# Patient Record
Sex: FEMALE | Race: WHITE | Employment: UNEMPLOYED | URBAN - METROPOLITAN AREA
[De-identification: names, ages, dates, MRNs, and addresses within clinical notes are randomized per-mention and may not be internally consistent; named-entity substitution may affect disease eponyms.]

---

## 2017-08-29 PROBLEM — Z34.00 SUPERVISION OF LOW-RISK FIRST PREGNANCY: Status: ACTIVE | Noted: 2017-08-29

## 2017-10-08 ENCOUNTER — HOSPITAL ENCOUNTER (EMERGENCY)
Age: 32
Discharge: HOME | End: 2017-10-08
Attending: OBSTETRICS & GYNECOLOGY | Admitting: OBSTETRICS & GYNECOLOGY
Payer: MEDICAID

## 2017-10-08 VITALS
HEIGHT: 64 IN | HEART RATE: 95 BPM | WEIGHT: 179 LBS | SYSTOLIC BLOOD PRESSURE: 127 MMHG | DIASTOLIC BLOOD PRESSURE: 80 MMHG | RESPIRATION RATE: 18 BRPM | TEMPERATURE: 98.9 F | BODY MASS INDEX: 30.56 KG/M2

## 2017-10-08 PROBLEM — O42.90 AMNIOTIC FLUID LEAKING: Status: ACTIVE | Noted: 2017-10-08

## 2017-10-08 LAB
A1 MICROGLOB PLACENTAL VAG QL: NEGATIVE
CONTROL LINE PRESENT?: YES
EXPIRATION DATE: NORMAL
INTERNAL NEGATIVE CONTROL: NEGATIVE
KIT LOT NO.: NORMAL

## 2017-10-08 PROCEDURE — 84112 EVAL AMNIOTIC FLUID PROTEIN: CPT | Performed by: OBSTETRICS & GYNECOLOGY

## 2017-10-08 PROCEDURE — 99283 EMERGENCY DEPT VISIT LOW MDM: CPT

## 2017-10-08 PROCEDURE — 59025 FETAL NON-STRESS TEST: CPT

## 2017-10-08 NOTE — IP AVS SNAPSHOT
Summary of Care Report The Summary of Care report has been created to help improve care coordination. Users with access to [employer] or [address] (Web-based application) may access additional patient information including the Discharge Summary. If you are not currently a [address] user and need more information, please call the number listed below in the [place] [address] section and ask to be connected with Medical Records. Facility Information Name Address Phone [id number] [place] [address] [address] [place] [address] [phone] Patient Information Patient Name Sex  April Jamil ([id number]) Female 1985 Discharge Information Admitting Provider Service Area Unit Teena Parra MD / [address] [facility] 4 Antepartum / [phone] Discharge Provider Discharge Date/Time Discharge Disposition Destination (none) 10/8/2017 22:05 (Pending) AHR (none) Patient Language Language ENGLISH [13] Hospital Problems as of 10/8/2017  Reviewed: 10/4/2017  4:17 PM by Pa Tadeo MD  
  
  
  
 Class Noted - Resolved Last Modified POA Active Problems Amniotic fluid leaking  10/8/2017 - Present 10/8/2017 by Teena Parra MD Unknown Entered by Teena Parra MD  
  
Non-Hospital Problems as of 10/8/2017  Reviewed: 10/4/2017  4:17 PM by Pa Tadeo MD  
  
  
  
 Class Noted - Resolved Last Modified Active Problems Supervision of low-risk first pregnancy  2017 - Present 2017 by Pa Tadeo MD  
  Entered by Pa Tadeo MD  
  Overview Addendum 2017  4:38 PM by Pa Tadeo MD  
   LATE TX at 33wks from [place] PN labs pending; glucola wnl 1st [place] screen wnl, NT 1.1 mm Records reviewed:  LMP 10/1/17; [facility] 10/17/17 Placenta post/high; growth at [facility] there wnl Bps wnl in [facility] although several higher normotensive (133/81, 131/82, 127/86) Pap---no performed in [facility] (will get Pp) ; Cxs __ Get growth US NV ____ Repeat US here NV___ You are allergic to the following No active allergies Current Discharge Medication List  
  
ASK your doctor about these medications Dose & Instructions Dispensing Information Comments CALCIUM 600 + D 600-125 mg-unit Tab Generic drug:  calcium-cholecalciferol (d3) Take  by mouth. Refills:  0 Iron 160 mg (50 mg iron) Tber tablet Generic drug:  ferrous sulfate ER Dose:  1 Tab Take 1 Tab by mouth daily. Refills:  0 PNV#16-Iron Fum & PS-FA-OM-3 35-1-200 mg Cap Take  by mouth. Refills:  0 Current Immunizations Name Date Influenza Vaccine (Quad) Mdck Pf 9/21/2017 Follow-up Information Follow up With Details Comments Contact Info None   None (395) Patient stated that they have no PCP Discharge Instructions PLEASE ATTEND SCHEDULED OB PRENATAL APPOINTMENT. RETURN TO OB TRIAGE IF YOU THINK YOU ARE IN LABOR, OR IF YOUR WATER BREAKS, OR IF VAGINAL BLEEDING. OR ANY OTHER CONCERNS. Chart Review Routing History No Routing History on File

## 2017-10-08 NOTE — IP AVS SNAPSHOT
Sergio Echols 
 
 
 [address] [place] [address] 
[phone] Patient: Lucia Aguirre MRN: [id number] FJI:[birth year] Current Discharge Medication List  
  
ASK your doctor about these medications Dose & Instructions Dispensing Information Comments Morning Noon Evening Bedtime CALCIUM 600 + D 600-125 mg-unit Tab Generic drug:  calcium-cholecalciferol (d3) Your last dose was: Your next dose is: Take  by mouth. Refills:  0 Iron 160 mg (50 mg iron) Tber tablet Generic drug:  ferrous sulfate ER Your last dose was: Your next dose is:    
   
   
 Dose:  1 Tab Take 1 Tab by mouth daily. Refills:  0 PNV#16-Iron Fum & PS-FA-OM-3 35-1-200 mg Cap Your last dose was: Your next dose is: Take  by mouth. Refills:  0

## 2017-10-08 NOTE — IP AVS SNAPSHOT
[facility] [address] 
[phone] Patient: Rodolfo Goldberg MRN: [id number] RSS:7/4/4321 You are allergic to the following No active allergies Recent Documentation Height Weight BMI OB Status Smoking Status 1.626 m 81.2 kg 30.73 kg/m2 Pregnant Former Smoker Emergency Contacts Name Discharge Info Relation Home Work Mobile Dawood Wynn  Mother [14] [phone] About your hospitalization You were admitted on:  N/A You last received care in the:  [facility] 4 ANTEPARTUM You were discharged on:  October 8, 2017 Unit phone number:  [phone] Why you were hospitalized Your primary diagnosis was:  Not on File Your diagnoses also included:  Amniotic Fluid Leaking Providers Seen During Your Hospitalizations Provider Role Specialty Primary office phone Conrado Mueller MD Attending Provider Obstetrics & Gynecology [phone] Your Primary Care Physician (PCP) Primary Care Physician Office Phone Office Fax NONE ** None ** ** None ** Follow-up Information Follow up With Details Comments Contact Info None   None (395) Patient stated that they have no PCP Your Appointments Thursday October 12, 2017  1:30 PM EDT Ultrasound plus physician visit with [facility] ([facility]) [address] [address] [phone] Thursday October 12, 2017  2:15 PM EDT Ultrasound plus physician visit with Narciso Ortega MD  
[facility] ([facility]) [address] [address] [phone] Current Discharge Medication List  
  
ASK your doctor about these medications Dose & Instructions Dispensing Information Comments Morning Noon Evening Bedtime CALCIUM 600 + D 600-125 mg-unit Tab Generic drug:  calcium-cholecalciferol (d3) Your last dose was: Your next dose is: Take  by mouth. Refills:  0 Iron 160 mg (50 mg iron) Tber tablet Generic drug:  ferrous sulfate ER Your last dose was: Your next dose is:    
   
   
 Dose:  1 Tab Take 1 Tab by mouth daily. Refills:  0 PNV#16-Iron Fum & PS-FA-OM-3 35-1-200 mg Cap Your last dose was: Your next dose is: Take  by mouth. Refills:  0 Discharge Instructions PLEASE ATTEND SCHEDULED OB PRENATAL APPOINTMENT. RETURN TO OB TRIAGE IF YOU THINK YOU ARE IN LABOR, OR IF YOUR WATER BREAKS, OR IF VAGINAL BLEEDING. OR ANY OTHER CONCERNS. Discharge Instructions Attachments/References PREGNANCY: WEEK 38 (ENGLISH) PREGNANCY: FARHEEN COOK CONTRACTIONS (ENGLISH) Discharge Orders None Introducing [facility] SERVICES! [employer] introduces [employer] patient portal. Now you can access parts of your medical record, email your doctor's office, and request medication refills online. 1. In your internet browser, go to [email or web address] 2. Click on the First Time User? Click Here link in the Sign In box. You will see the New Member Sign Up page. 3. Enter your [employer] Access Code exactly as it appears below. You will not need to use this code after youve completed the sign-up process. If you do not sign up before the expiration date, you must request a new code. · [employer] Access Code: [email or web address] Expires: 11/26/2017 10:15 AM 
 
4. Enter the last four digits of your Social Security Number (xxxx) and Date of Birth (mm/dd/yyyy) as indicated and click Submit. You will be taken to the next sign-up page. 5. Create a [employer]er ID. This will be your [employer]er login ID and cannot be changed, so think of one that is secure and easy to remember. 6. Create a [facility] password. You can change your password at any time. 7. Enter your Password Reset Question and Answer. This can be used at a later time if you forget your password. 8. Enter your e-mail address. You will receive e-mail notification when new information is available in [address] 9. Click Sign Up. You can now view and download portions of your medical record. 10. Click the Download Summary menu link to download a portable copy of your medical information. If you have questions, please visit the Frequently Asked Questions section of the [employer] website. Remember, [facility] is NOT to be used for urgent needs. For medical emergencies, dial 911. Now available from your iPhone and Android! General Information Please provide this summary of care documentation to your next provider. Patient Signature:  ____________________________________________________________ Date:  ____________________________________________________________  
  
Lloyd Lambert Provider Signature:  ____________________________________________________________ Date:  ____________________________________________________________ More Information Week 38 of Your Pregnancy: Care Instructions Your Care Instructions Believe it or not, your baby is almost here. You may have ideas about your baby's personality because of how much he or she moves. Or you may have noticed how he or she responds to sounds, warmth, cold, and light. You may even know what kind of music your baby likes. By now, you have a better idea of what to expect during delivery. You may have talked about your birth preferences with your doctor. But even if you want a vaginal birth, it is a good idea to learn about  births.  birth means that your baby is born through a cut (incision) in your lower belly. It is sometimes the best choice for the health of the baby and the mother. This care sheet can help you understand  births. It also gives you information about what to expect after your baby is born. And it helps you understand more about postpartum depression. Follow-up care is a key part of your treatment and safety. Be sure to make and go to all appointments, and call your doctor if you are having problems. It's also a good idea to know your test results and keep a list of the medicines you take. How can you care for yourself at home? Learn about  birth · Most C-sections are unplanned. They are done because of problems that occur during labor. These problems might include: 
¨ Labor that slows or stops. ¨ High blood pressure or other problems for the mother. ¨ Signs of distress in the baby. These signs may include a very fast or slow heart rate. · Although most mothers and babies do well after , it is major surgery. It has more risks than a vaginal delivery. · In some cases, a planned  may be safer than a vaginal delivery. This may be the case if: ¨ The mother has a health problem, such as a heart condition. ¨ The baby isn't in a head-down position for delivery. This is called a breech position. ¨ The uterus has scars from past surgeries. This could increase the chance of a tear in the uterus. ¨ There is a problem with the placenta. ¨ The mother has an infection, such as genital herpes, that could be spread to the baby. ¨ The mother is having twins or more. ¨ The baby weighs 9 to 10 pounds or more. · Because of the risks of , planned C-sections generally should be done only for medical reasons. And a planned  should be done at 39 weeks or later unless there is a medical reason to do it sooner. Know what to expect after delivery, and plan for the first few weeks at home · You, your baby, and your partner or  will get identification bands. Only people with matching bands can  the baby from the nursery. · You will learn how to feed, diaper, and bathe your baby. And you will learn how to care for the umbilical cord stump. If your baby will be circumcised, you will also learn how to care for that. · Ask people to wait to visit you until you are at home. And ask them to wash their hands before they touch your baby. · Make sure you have another adult in your home for at least 2 or 3 days after the birth. · During the first 2 weeks, limit when friends and family can visit. · Do not allow visitors who have colds or infections. Make sure all visitors are up to date with their vaccinations. Never let anyone smoke around your baby. · Try to nap when the baby naps. Be aware of postpartum depression · \"Baby blues\" are common for the first 1 to 2 weeks after birth. You may cry or feel sad or irritable for no reason. · For some women, these feelings last longer and are more intense. This is called postpartum depression. · If your symptoms last for more than a few weeks or you feel very depressed, ask your doctor for help. · Postpartum depression can be treated. Support groups and counseling can help. Sometimes medicine can also help. Where can you learn more? Go to http://archie-kyle.info/. Enter B044 in the search box to learn more about \"Week 38 of Your Pregnancy: Care Instructions. \" Current as of: March 16, 2017 Content Version: 11.3 © 8420-2602 [employer]. Care instructions adapted under license by [employer] (which disclaims liability or warranty for this information). If you have questions about a medical condition or this instruction, always ask your healthcare professional. [place] [address] any warranty or liability for your use of this information. [facility] Contractions: Care Instructions Your Care Instructions [place] contractions prepare your uterus for labor.  Think of them as a \"warm-up\" exercise that your body does. You may begin to feel them between the 28th and 30th weeks of your pregnancy. But they start as early as the 20th week. [place] Thapa contractions usually occur more often during the ninth month. They may go away when you are active and return when you rest. These contractions are like mild contractions of true labor, but they occur less often. (You feel fewer than 8 in an hour.) They don't cause your cervix to open. It may be hard for you to tell the difference between [place] contractions and true labor, especially in your first pregnancy. Follow-up care is a key part of your treatment and safety. Be sure to make and go to all appointments, and call your doctor if you are having problems. It's also a good idea to know your test results and keep a list of the medicines you take. How can you care for yourself at home? · Try a warm bath to help relieve muscle tension and reduce pain. · Change positions every 30 minutes. Take breaks if you must sit for a long time. Get up and walk around. · Drink plenty of water, enough so that your urine is light yellow or clear like water. · Taking short walks may help you feel better. Your doctor needs to check any contractions that are getting stronger or closer together. Where can you learn more? Go to http://archie-kyle.info/. Enter 117 912 199 in the search box to learn more about \"Fabrizio Thapa Contractions: Care Instructions. \" Current as of: March 16, 2017 Content Version: 11.3 © 7848-9225 [employer]. Care instructions adapted under license by [employer] (which disclaims liability or warranty for this information). If you have questions about a medical condition or this instruction, always ask your healthcare professional. [place] [address] any warranty or liability for your use of this information.

## 2017-10-09 NOTE — ED PROVIDER NOTES
Chief Complaint:      29 y.o. female at 38w5d  weeks gestation who is seen for mild vaginal leaking of fluid. Passed a large amount of fluid when she voided. None since. No leaking w cough or sneeze. Good fetal movement, no contractions. HISTORY:    History   Sexual Activity    Sexual activity: Yes    Partners: Male    Birth control/ protection: None     Patient's last menstrual period was 01/10/2017. Social History     Social History    Marital status:      Spouse name: N/A    Number of children: N/A    Years of education: N/A     Occupational History    Not on file. Social History Main Topics    Smoking status: Never Smoker    Smokeless tobacco: Never Used    Alcohol use No    Drug use: No    Sexual activity: Yes     Partners: Male     Birth control/ protection: None     Other Topics Concern    Not on file     Social History Narrative    No narrative on file       Past Surgical History:   Procedure Laterality Date    HX REFRACTIVE SURGERY      HX WISDOM TEETH EXTRACTION         Past Medical History:   Diagnosis Date    Anemia     Breast disorder     right breast lump-benign         ROS:  A 12 point review of symptoms negative except for chief complaint as described above. PHYSICAL EXAM:  Last menstrual period 01/10/2017. Constitutional: The patient appears well, alert, oriented x 3. Cardiovascular: Heart RRR, no murmurs. Respiratory: Lungs clear, no respiratory distress  GI: Abdomen soft, nontender, no guarding  No fundal tenderness  Musculoskeletal: no cva tenderness  Upper ext: no edema, reflexes +2  Lower ext: no edema, neg marcelino's, reflexes +2  Skin: no rashes or lesions  Psychiatric:Mood/ Affect: appropriate  Genitourinary: SVE: deferred, no contractions   FHT:140's cat 1  TOCO:no contractions  Amnisure neg, perineum dry    I personally reviewed pt's medical record including relevant labs and ultrasounds    Assessment/Plan: False labor, no evidence ROM. Stable for home, follow up this week as scheduled. Discussed indications for return.

## 2017-10-09 NOTE — PROGRESS NOTES
Amnisure negative. No contractions noted. Patient deneis any pain or contractions. Patient was \"closed cervix\" in office yesterday. Reactive NST. Spoke to Dr. Raquel Hernandez. Order received to discharge patient at this time.

## 2017-10-09 NOTE — PROGRESS NOTES
Patient presents to labor and delivery triage ambulatory with mother. Complains of possible rupture of membranes. Reports that at 1900 she went to use the restroom and had a gush of clear, odorless fluid. No leaking since then. Underwear is dry. denies contractions. Denies vaginal bleeding.

## 2017-10-09 NOTE — DISCHARGE INSTRUCTIONS
PLEASE ATTEND SCHEDULED OB PRENATAL APPOINTMENT. RETURN TO OB TRIAGE IF YOU THINK YOU ARE IN LABOR, OR IF YOUR WATER BREAKS, OR IF VAGINAL BLEEDING. OR ANY OTHER CONCERNS.

## 2017-10-09 NOTE — PROGRESS NOTES
Written and verbal discharge instructions provided to patient, including labor precautions. All questions answered. patient ambulates to exit in good condition with mother to provide transportation.

## 2017-10-15 ENCOUNTER — HOSPITAL ENCOUNTER (EMERGENCY)
Age: 32
Discharge: HOME | End: 2017-10-15
Attending: OBSTETRICS & GYNECOLOGY | Admitting: OBSTETRICS & GYNECOLOGY
Payer: MEDICAID

## 2017-10-15 PROBLEM — O47.1 FALSE LABOR AFTER 37 WEEKS OF GESTATION WITHOUT DELIVERY: Status: ACTIVE | Noted: 2017-10-15

## 2017-10-15 PROCEDURE — 59025 FETAL NON-STRESS TEST: CPT

## 2017-10-15 PROCEDURE — 99283 EMERGENCY DEPT VISIT LOW MDM: CPT

## 2017-10-15 NOTE — DISCHARGE INSTRUCTIONS
Pregnancy Precautions: Care Instructions  Your Care Instructions  There is no sure way to prevent labor before your due date ( labor) or to prevent most other pregnancy problems. But there are things you can do to increase your chances of a healthy pregnancy. Go to your appointments, follow your doctor's advice, and take good care of yourself. Eat well, and exercise (if your doctor agrees). And make sure to drink plenty of water. Follow-up care is a key part of your treatment and safety. Be sure to make and go to all appointments, and call your doctor if you are having problems. It's also a good idea to know your test results and keep a list of the medicines you take. How can you care for yourself at home? · Make sure you go to your prenatal appointments. At each visit, your doctor will check your blood pressure. Your doctor will also check to see if you have protein in your urine. High blood pressure and protein in urine are signs of preeclampsia. This condition can be dangerous for you and your baby. · Drink plenty of fluids, enough so that your urine is light yellow or clear like water. Dehydration can cause contractions. If you have kidney, heart, or liver disease and have to limit fluids, talk with your doctor before you increase the amount of fluids you drink. · Tell your doctor right away if you notice any symptoms of an infection, such as:  ¨ Burning when you urinate. ¨ A foul-smelling discharge from your vagina. ¨ Vaginal itching. ¨ Unexplained fever. ¨ Unusual pain or soreness in your uterus or lower belly. · Eat a balanced diet. Include plenty of foods that are high in calcium and iron. ¨ Foods high in calcium include milk, cheese, yogurt, almonds, and broccoli. ¨ Foods high in iron include red meat, shellfish, poultry, eggs, beans, raisins, whole-grain bread, and leafy green vegetables. · Do not smoke.  If you need help quitting, talk to your doctor about stop-smoking programs and medicines. These can increase your chances of quitting for good. · Do not drink alcohol or use illegal drugs. · Follow your doctor's directions about activity. Your doctor will let you know how much, if any, exercise you can do. · Ask your doctor if you can have sex. If you are at risk for early labor, your doctor may ask you to not have sex. · Take care to prevent falls. During pregnancy, your joints are loose, and your balance is off. Sports such as bicycling, skiing, or in-line skating can increase your risk of falling. And don't ride horses or motorcycles, dive, water ski, scuba dive, or parachute jump while you are pregnant. · Avoid getting very hot. Do not use saunas or hot tubs. Avoid staying out in the sun in hot weather for long periods. Take acetaminophen (Tylenol) to lower a high fever. · Do not take any over-the-counter or herbal medicines or supplements without talking to your doctor or pharmacist first.  When should you call for help? Call 911 anytime you think you may need emergency care. For example, call if:  · You passed out (lost consciousness). · You have severe vaginal bleeding. · You have severe pain in your belly or pelvis. · You have had fluid gushing or leaking from your vagina and you know or think the umbilical cord is bulging into your vagina. If this happens, immediately get down on your knees so your rear end (buttocks) is higher than your head. This will decrease the pressure on the cord until help arrives. Call your doctor now or seek immediate medical care if:  · You have signs of preeclampsia, such as:  ¨ Sudden swelling of your face, hands, or feet. ¨ New vision problems (such as dimness or blurring). ¨ A severe headache. · You have any vaginal bleeding. · You have belly pain or cramping. · You have a fever. · You have had regular contractions (with or without pain) for an hour.  This means that you have 8 or more within 1 hour or 4 or more in 20 minutes after you change your position and drink fluids. · You have a sudden release of fluid from your vagina. · You have low back pain or pelvic pressure that does not go away. · You notice that your baby has stopped moving or is moving much less than normal.  Watch closely for changes in your health, and be sure to contact your doctor if you have any problems. Where can you learn more? Go to http://archie-kyle.info/. Enter [id number] in the search box to learn more about \"Pregnancy Precautions: Care Instructions. \"  Current as of: March 16, 2017  Content Version: 11.3  © 0786-0848 MC[employer]. Care instructions adapted under license by [employer] (which disclaims liability or warranty for this information). If you have questions about a medical condition or this instruction, always ask your healthcare professional. [address] any warranty or liability for your use of this information. Counting Your Baby's Kicks: Care Instructions  Your Care Instructions  Counting your baby's kicks is one way your doctor can tell that your baby is healthy. Most women--especially in a first pregnancy--feel their baby move for the first time between 16 and 22 weeks. The movement may feel like flutters rather than kicks. Your baby may move more at certain times of the day. When you are active, you may notice less kicking than when you are resting. At your prenatal visits, your doctor will ask whether the baby is active. In your last trimester, your doctor may ask you to count the number of times you feel your baby move. Follow-up care is a key part of your treatment and safety. Be sure to make and go to all appointments, and call your doctor if you are having problems. It's also a good idea to know your test results and keep a list of the medicines you take. How do you count fetal kicks?   · A common method of checking your baby's movement is to count the number of kicks or moves you feel in 1 hour. Ten movements (such as kicks, flutters, or rolls) in 1 hour are normal. Some doctors suggest that you count in the morning until you get to 10 movements. Then you can quit for that day and start again the next day. · Pick your baby's most active time of day to count. This may be any time from morning to evening. · If you do not feel 10 movements in an hour, your baby may be sleeping. Wait for the next hour and count again. When should you call for help? Call your doctor now or seek immediate medical care if:  · You noticed that your baby has stopped moving or is moving much less than normal.  Watch closely for changes in your health, and be sure to contact your doctor if you have any problems. Where can you learn more? Go to http://archie-kyle.info/. Enter J609 in the search box to learn more about \"Counting Your Baby's Kicks: Care Instructions. \"  Current as of: March 16, 2017  Content Version: 11.3  © 7726-4117 [employer]. Care instructions adapted under license by [employer] (which disclaims liability or warranty for this information). If you have questions about a medical condition or this instruction, always ask your healthcare professional. [address] any warranty or liability for your use of this information.

## 2017-10-15 NOTE — IP AVS SNAPSHOT
Brittnee Joseph 
 
 
 [address] 
[phone] Patient: Jl Russo MRN: [id number] OEA:[birth year] Current Discharge Medication List  
  
ASK your doctor about these medications Dose & Instructions Dispensing Information Comments Morning Noon Evening Bedtime CALCIUM 600 + D 600-125 mg-unit Tab Generic drug:  calcium-cholecalciferol (d3) Your last dose was: Your next dose is: Take  by mouth. Refills:  0 Iron 160 mg (50 mg iron) Tber tablet Generic drug:  ferrous sulfate ER Your last dose was: Your next dose is:    
   
   
 Dose:  1 Tab Take 1 Tab by mouth daily. Refills:  0 PNV#16-Iron Fum & PS-FA-OM-3 35-1-200 mg Cap Your last dose was: Your next dose is: Take  by mouth. Refills:  0

## 2017-10-15 NOTE — ED PROVIDER NOTES
Chief Complaint:painful contractions      29 y.o. female G1 at 39w5d  weeks gestation who is seen for mild abdominal pain. Pt reports contractions since Friday- mild. No loss of bleeding or loss of fluid. Good fetal movement. HISTORY:    History   Sexual Activity    Sexual activity: Yes    Partners: Male    Birth control/ protection: None     Patient's last menstrual period was 01/10/2017. Social History     Social History    Marital status:      Spouse name: N/A    Number of children: N/A    Years of education: N/A     Occupational History    Not on file. Social History Main Topics    Smoking status: Former Smoker     Packs/day: 0.25     Years: 2.00     Quit date: 2/10/2017    Smokeless tobacco: Never Used    Alcohol use No    Drug use: No    Sexual activity: Yes     Partners: Male     Birth control/ protection: None     Other Topics Concern    Not on file     Social History Narrative       Past Surgical History:   Procedure Laterality Date    HX REFRACTIVE SURGERY      HX WISDOM TEETH EXTRACTION         Past Medical History:   Diagnosis Date    Anemia     Breast disorder     right breast lump-benign         ROS:  A 12 point review of symptoms negative except for chief complaint as described above. PHYSICAL EXAM:  Last menstrual period 01/10/2017. Constitutional: The patient appears well, alert, oriented x 3. Cardiovascular: Heart RRR, no murmurs.    Respiratory: Lungs clear, no respiratory distress  GI: Abdomen soft, nontender, no guarding  No fundal tenderness  Musculoskeletal: no cva tenderness  Upper ext: no edema, reflexes +2  Lower ext: no edema, neg marcelino's, reflexes +2  Skin: no rashes or lesions  Psychiatric:Mood/ Affect: appropriate  Genitourinary: SVE:cl/th/ mid   FHT:reactive, cat 1  TOCO:no contractions seen    I personally reviewed pt's medical record including relevant labs and ultrasounds    Assessment/Plan:  28 yo G1 at 39w5d with early latent labor  Reactive nst  Keep f/u as scheduled tomorrow  precuations

## 2017-10-15 NOTE — IP AVS SNAPSHOT
Summary of Care Report The Summary of Care report has been created to help improve care coordination. Users with access to [employer] or engage[facility]ply [facility] (Web-based application) may access additional patient information including the Discharge Summary. If you are not currently a [address] user and need more information, please call the number listed below in the [place] [address] section and ask to be connected with Medical Records. Facility Information Name Address Phone [address] [place] [address] [address] [place] [phone] Patient Information Patient Name Sex BRYSON Byrne ([id number]) Female 1985 Discharge Information Admitting Provider Service Area Unit Michael Wynn MD / [address] [address] / [phone] Discharge Provider Discharge Date/Time Discharge Disposition Destination (none) 10/15/2017 (Pending) AHR (none) Patient Language Language ENGLISH [13] Hospital Problems as of 10/15/2017  Reviewed: 10/12/2017  2:35 PM by Nesha Garcia MD  
  
  
  
 Class Noted - Resolved Last Modified POA Active Problems False labor after 37 weeks of gestation without delivery  10/15/2017 - Present 10/15/2017 by Michael Wynn MD Unknown Entered by Michael Wynn MD  
  
Non-Hospital Problems as of 10/15/2017  Reviewed: 10/12/2017  2:35 PM by Nesha Garcia MD  
  
  
  
 Class Noted - Resolved Last Modified Active Problems Supervision of low-risk first pregnancy  2017 - Present 10/15/2017 by Nesha Garcia MD  
  Entered by Nesha Garcia MD  
  Overview Addendum 10/15/2017  8:33 AM by Nesha Garcia MD  
   LATE TX at 33wks from [place] PN labs pending; glucola wnl 1st Trim screen wnl, NT 1.1 mm Records reviewed:  LMP 10/1/17; [address] 10/17/17 Placenta post/high; growth at [facility] there wnl Bps wnl in [facility] although several higher normotensive (133/81, 131/82, 127/86) Pap---not performed in [facility] (will get Pp) ; Cxs __ Get growth US NV ____ Repeat US here NV___ Amniotic fluid leaking  10/8/2017 - Present 10/8/2017 by Abdiel Gonzalez MD  
  Entered by Abdiel Gonzalez MD  
  
You are allergic to the following No active allergies Current Discharge Medication List  
  
ASK your doctor about these medications Dose & Instructions Dispensing Information Comments CALCIUM 600 + D 600-125 mg-unit Tab Generic drug:  calcium-cholecalciferol (d3) Take  by mouth. Refills:  0 Iron 160 mg (50 mg iron) Tber tablet Generic drug:  ferrous sulfate ER Dose:  1 Tab Take 1 Tab by mouth daily. Refills:  0 PNV#16-Iron Fum & PS-FA-OM-3 35-1-200 mg Cap Take  by mouth. Refills:  0 Current Immunizations Name Date Influenza Vaccine (Quad) Mdck Pf 2017 Follow-up Information Follow up With Details Comments Contact Info None   None (395) Patient stated that they have no PCP Discharge Instructions Pregnancy Precautions: Care Instructions Your Care Instructions There is no sure way to prevent labor before your due date ( labor) or to prevent most other pregnancy problems. But there are things you can do to increase your chances of a healthy pregnancy. Go to your appointments, follow your doctor's advice, and take good care of yourself. Eat well, and exercise (if your doctor agrees). And make sure to drink plenty of water. Follow-up care is a key part of your treatment and safety. Be sure to make and go to all appointments, and call your doctor if you are having problems. It's also a good idea to know your test results and keep a list of the medicines you take. How can you care for yourself at home? · Make sure you go to your prenatal appointments. At each visit, your doctor will check your blood pressure. Your doctor will also check to see if you have protein in your urine. High blood pressure and protein in urine are signs of preeclampsia. This condition can be dangerous for you and your baby. · Drink plenty of fluids, enough so that your urine is light yellow or clear like water. Dehydration can cause contractions. If you have kidney, heart, or liver disease and have to limit fluids, talk with your doctor before you increase the amount of fluids you drink. · Tell your doctor right away if you notice any symptoms of an infection, such as: ¨ Burning when you urinate. ¨ A foul-smelling discharge from your vagina. ¨ Vaginal itching. ¨ Unexplained fever. ¨ Unusual pain or soreness in your uterus or lower belly. · Eat a balanced diet. Include plenty of foods that are high in calcium and iron. ¨ Foods high in calcium include milk, cheese, yogurt, almonds, and broccoli. ¨ Foods high in iron include red meat, shellfish, poultry, eggs, beans, raisins, whole-grain bread, and leafy green vegetables. · Do not smoke. If you need help quitting, talk to your doctor about stop-smoking programs and medicines. These can increase your chances of quitting for good. · Do not drink alcohol or use illegal drugs. · Follow your doctor's directions about activity. Your doctor will let you know how much, if any, exercise you can do. · Ask your doctor if you can have sex. If you are at risk for early labor, your doctor may ask you to not have sex. · Take care to prevent falls. During pregnancy, your joints are loose, and your balance is off. Sports such as bicycling, skiing, or in-line skating can increase your risk of falling. And don't ride horses or motorcycles, dive, water ski, scuba dive, or parachute jump while you are pregnant. · Avoid getting very hot. Do not use saunas or hot tubs.  Avoid staying out in the sun in hot weather for long periods. Take acetaminophen (Tylenol) to lower a high fever. · Do not take any over-the-counter or herbal medicines or supplements without talking to your doctor or pharmacist first. 
When should you call for help? Call 911 anytime you think you may need emergency care. For example, call if: 
· You passed out (lost consciousness). · You have severe vaginal bleeding. · You have severe pain in your belly or pelvis. · You have had fluid gushing or leaking from your vagina and you know or think the umbilical cord is bulging into your vagina. If this happens, immediately get down on your knees so your rear end (buttocks) is higher than your head. This will decrease the pressure on the cord until help arrives. Call your doctor now or seek immediate medical care if: 
· You have signs of preeclampsia, such as: 
¨ Sudden swelling of your face, hands, or feet. ¨ New vision problems (such as dimness or blurring). ¨ A severe headache. · You have any vaginal bleeding. · You have belly pain or cramping. · You have a fever. · You have had regular contractions (with or without pain) for an hour. This means that you have 8 or more within 1 hour or 4 or more in 20 minutes after you change your position and drink fluids. · You have a sudden release of fluid from your vagina. · You have low back pain or pelvic pressure that does not go away. · You notice that your baby has stopped moving or is moving much less than normal. 
Watch closely for changes in your health, and be sure to contact your doctor if you have any problems. Where can you learn more? Go to http://archie-kyle.info/. Enter [id number] in the search box to learn more about \"Pregnancy Precautions: Care Instructions. \" Current as of: March 16, 2017 Content Version: 11.3 © 8968-4366 [employer].  Care instructions adapted under license by [address] (which disclaims liability or warranty for this information). If you have questions about a medical condition or this instruction, always ask your healthcare professional. [address] any warranty or liability for your use of this information. Counting Your Baby's Kicks: Care Instructions Your Care Instructions Counting your baby's kicks is one way your doctor can tell that your baby is healthy. Most womenespecially in a first pregnancyfeel their baby move for the first time between 16 and 22 weeks. The movement may feel like flutters rather than kicks. Your baby may move more at certain times of the day. When you are active, you may notice less kicking than when you are resting. At your prenatal visits, your doctor will ask whether the baby is active. In your last trimester, your doctor may ask you to count the number of times you feel your baby move. Follow-up care is a key part of your treatment and safety. Be sure to make and go to all appointments, and call your doctor if you are having problems. It's also a good idea to know your test results and keep a list of the medicines you take. How do you count fetal kicks? · A common method of checking your baby's movement is to count the number of kicks or moves you feel in 1 hour. Ten movements (such as kicks, flutters, or rolls) in 1 hour are normal. Some doctors suggest that you count in the morning until you get to 10 movements. Then you can quit for that day and start again the next day. · Pick your baby's most active time of day to count. This may be any time from morning to evening. · If you do not feel 10 movements in an hour, your baby may be sleeping. Wait for the next hour and count again. When should you call for help?  
Call your doctor now or seek immediate medical care if: 
· You noticed that your baby has stopped moving or is moving much less than normal. 
 Watch closely for changes in your health, and be sure to contact your doctor if you have any problems. Where can you learn more? Go to http://archie-kyle.info/. Enter U165 in the search box to learn more about \"Counting Your Baby's Kicks: Care Instructions. \" Current as of: March 16, 2017 Content Version: 11.3 © 5348-6644 [employer]. Care instructions adapted under license by [employer] (which disclaims liability or warranty for this information). If you have questions about a medical condition or this instruction, always ask your healthcare professional. [place] [address] any warranty or liability for your use of this information. Chart Review Routing History No Routing History on File

## 2017-10-15 NOTE — PROGRESS NOTES
Discharge instructions reviewed. Pt verbalizes understanding. Pt discharged home in stable condition. Family at side.

## 2017-10-15 NOTE — IP AVS SNAPSHOT
[address] 
[phone] Patient: Lo Gordon MRN: [id number] MCZ:[id number] You are allergic to the following No active allergies Recent Documentation OB Status Smoking Status Pregnant Former Smoker Emergency Contacts Name Discharge Info Relation Home Work Mobile Janie Guerrero  Mother [14] [phone] About your hospitalization You were admitted on:  N/A You last received care in the:  [facility] You were discharged on:  October 15, 2017 Unit phone number:  [phone] Why you were hospitalized Your primary diagnosis was:  Not on File Your diagnoses also included:  False Labor After 37 Weeks Of Gestation Without Delivery Providers Seen During Your Hospitalizations Provider Role Specialty Primary office phone Gabriela Gambino MD Attending Provider Obstetrics & Gynecology [phone] Your Primary Care Physician (PCP) Primary Care Physician Office Phone Office Fax NONE ** None ** ** None ** Follow-up Information Follow up With Details Comments Contact Info None   None ([phone]) Patient stated that they have no PCP Your Appointments Monday October 16, 2017  9:15 AM EDT Return OB with Gabriela Gambino MD  
[facility] ([address] [id number] [phone] Current Discharge Medication List  
  
ASK your doctor about these medications Dose & Instructions Dispensing Information Comments Morning Noon Evening Bedtime CALCIUM 600 + D 600-125 mg-unit Tab Generic drug:  calcium-cholecalciferol (d3) Your last dose was: Your next dose is: Take  by mouth. Refills:  0 Iron 160 mg (50 mg iron) Tber tablet Generic drug:  ferrous sulfate ER  
   
 Your last dose was: Your next dose is:    
   
   
 Dose:  1 Tab Take 1 Tab by mouth daily. Refills:  0 PNV#16-Iron Fum & PS-FA-OM-3 35-1-200 mg Cap Your last dose was: Your next dose is: Take  by mouth. Refills:  0 Discharge Instructions Pregnancy Precautions: Care Instructions Your Care Instructions There is no sure way to prevent labor before your due date ( labor) or to prevent most other pregnancy problems. But there are things you can do to increase your chances of a healthy pregnancy. Go to your appointments, follow your doctor's advice, and take good care of yourself. Eat well, and exercise (if your doctor agrees). And make sure to drink plenty of water. Follow-up care is a key part of your treatment and safety. Be sure to make and go to all appointments, and call your doctor if you are having problems. It's also a good idea to know your test results and keep a list of the medicines you take. How can you care for yourself at home? · Make sure you go to your prenatal appointments. At each visit, your doctor will check your blood pressure. Your doctor will also check to see if you have protein in your urine. High blood pressure and protein in urine are signs of preeclampsia. This condition can be dangerous for you and your baby. · Drink plenty of fluids, enough so that your urine is light yellow or clear like water. Dehydration can cause contractions. If you have kidney, heart, or liver disease and have to limit fluids, talk with your doctor before you increase the amount of fluids you drink. · Tell your doctor right away if you notice any symptoms of an infection, such as: ¨ Burning when you urinate. ¨ A foul-smelling discharge from your vagina. ¨ Vaginal itching. ¨ Unexplained fever. ¨ Unusual pain or soreness in your uterus or lower belly. · Eat a balanced diet. Include plenty of foods that are high in calcium and iron. ¨ Foods high in calcium include milk, cheese, yogurt, almonds, and broccoli. ¨ Foods high in iron include red meat, shellfish, poultry, eggs, beans, raisins, whole-grain bread, and leafy green vegetables. · Do not smoke. If you need help quitting, talk to your doctor about stop-smoking programs and medicines. These can increase your chances of quitting for good. · Do not drink alcohol or use illegal drugs. · Follow your doctor's directions about activity. Your doctor will let you know how much, if any, exercise you can do. · Ask your doctor if you can have sex. If you are at risk for early labor, your doctor may ask you to not have sex. · Take care to prevent falls. During pregnancy, your joints are loose, and your balance is off. Sports such as bicycling, skiing, or in-line skating can increase your risk of falling. And don't ride horses or motorcycles, dive, water ski, scuba dive, or parachute jump while you are pregnant. · Avoid getting very hot. Do not use saunas or hot tubs. Avoid staying out in the sun in hot weather for long periods. Take acetaminophen (Tylenol) to lower a high fever. · Do not take any over-the-counter or herbal medicines or supplements without talking to your doctor or pharmacist first. 
When should you call for help? Call 911 anytime you think you may need emergency care. For example, call if: 
· You passed out (lost consciousness). · You have severe vaginal bleeding. · You have severe pain in your belly or pelvis. · You have had fluid gushing or leaking from your vagina and you know or think the umbilical cord is bulging into your vagina. If this happens, immediately get down on your knees so your rear end (buttocks) is higher than your head. This will decrease the pressure on the cord until help arrives. Call your doctor now or seek immediate medical care if: · You have signs of preeclampsia, such as: 
¨ Sudden swelling of your face, hands, or feet. ¨ New vision problems (such as dimness or blurring). ¨ A severe headache. · You have any vaginal bleeding. · You have belly pain or cramping. · You have a fever. · You have had regular contractions (with or without pain) for an hour. This means that you have 8 or more within 1 hour or 4 or more in 20 minutes after you change your position and drink fluids. · You have a sudden release of fluid from your vagina. · You have low back pain or pelvic pressure that does not go away. · You notice that your baby has stopped moving or is moving much less than normal. 
Watch closely for changes in your health, and be sure to contact your doctor if you have any problems. Where can you learn more? Go to http://archie-kyle.info/. Enter [id number] in the search box to learn more about \"Pregnancy Precautions: Care Instructions. \" Current as of: March 16, 2017 Content Version: 11.3 © 8378-5116 [employer]. Care instructions adapted under license by [employer] (which disclaims liability or warranty for this information). If you have questions about a medical condition or this instruction, always ask your healthcare professional. [address] any warranty or liability for your use of this information. Counting Your Baby's Kicks: Care Instructions Your Care Instructions Counting your baby's kicks is one way your doctor can tell that your baby is healthy. Most womenespecially in a first pregnancyfeel their baby move for the first time between 16 and 22 weeks. The movement may feel like flutters rather than kicks. Your baby may move more at certain times of the day. When you are active, you may notice less kicking than when you are resting. At your prenatal visits, your doctor will ask whether the baby is active. In your last trimester, your doctor may ask you to count the number of times you feel your baby move. Follow-up care is a key part of your treatment and safety. Be sure to make and go to all appointments, and call your doctor if you are having problems. It's also a good idea to know your test results and keep a list of the medicines you take. How do you count fetal kicks? · A common method of checking your baby's movement is to count the number of kicks or moves you feel in 1 hour. Ten movements (such as kicks, flutters, or rolls) in 1 hour are normal. Some doctors suggest that you count in the morning until you get to 10 movements. Then you can quit for that day and start again the next day. · Pick your baby's most active time of day to count. This may be any time from morning to evening. · If you do not feel 10 movements in an hour, your baby may be sleeping. Wait for the next hour and count again. When should you call for help? Call your doctor now or seek immediate medical care if: 
· You noticed that your baby has stopped moving or is moving much less than normal. 
Watch closely for changes in your health, and be sure to contact your doctor if you have any problems. Where can you learn more? Go to http://archie-kyle.info/. Enter I394 in the search box to learn more about \"Counting Your Baby's Kicks: Care Instructions. \" Current as of: March 16, 2017 Content Version: 11.3 © 1413-1724 [employer]. Care instructions adapted under license by [employer] (which disclaims liability or warranty for this information). If you have questions about a medical condition or this instruction, always ask your healthcare professional. [place] [address] any warranty or liability for your use of this information. Discharge Orders None Introducing [facility]!    
 [facility] [employer] introduces [employer] patient portal. Now you can access parts of your medical record, email your doctor's office, and request medication refills online. 1. In your internet browser, go to [email or web address] 2. Click on the First Time User? Click Here link in the Sign In box. You will see the New Member Sign Up page. 3. Enter your [employer] Access Code exactly as it appears below. You will not need to use this code after youve completed the sign-up process. If you do not sign up before the expiration date, you must request a new code. · [employer] Access Code: [email or web address] Expires: 11/26/2017 10:15 AM 
 
4. Enter the last four digits of your Social Security Number (xxxx) and Date of Birth (mm/dd/yyyy) as indicated and click Submit. You will be taken to the next sign-up page. 5. Create a Corvisa[employer] ID. This will be your [employer] login ID and cannot be changed, so think of one that is secure and easy to remember. 6. Create a [employer] password. You can change your password at any time. 7. Enter your Password Reset Question and Answer. This can be used at a later time if you forget your password. 8. Enter your e-mail address. You will receive e-mail notification when new information is available in [address] 9. Click Sign Up. You can now view and download portions of your medical record. 10. Click the Download Summary menu link to download a portable copy of your medical information. If you have questions, please visit the Frequently Asked Questions section of the [employer] website. Remember, [employer] is NOT to be used for urgent needs. For medical emergencies, dial 911. Now available from your iPhone and Android! General Information Please provide this summary of care documentation to your next provider. Patient Signature:  ____________________________________________________________ Date:  ____________________________________________________________  
  
Anya Julien  Provider Signature: ____________________________________________________________ Date:  ____________________________________________________________

## 2017-10-16 ENCOUNTER — HOSPITAL ENCOUNTER (INPATIENT)
Age: 32
LOS: 1 days | Discharge: HOME | DRG: 560 | End: 2017-10-17
Attending: OBSTETRICS & GYNECOLOGY | Admitting: OBSTETRICS & GYNECOLOGY
Payer: MEDICAID

## 2017-10-16 ENCOUNTER — ANESTHESIA (OUTPATIENT)
Dept: LABOR AND DELIVERY | Age: 32
DRG: 560 | End: 2017-10-16
Payer: MEDICAID

## 2017-10-16 ENCOUNTER — ANESTHESIA EVENT (OUTPATIENT)
Dept: LABOR AND DELIVERY | Age: 32
DRG: 560 | End: 2017-10-16
Payer: MEDICAID

## 2017-10-16 PROBLEM — O47.1 FALSE LABOR AFTER 37 WEEKS OF GESTATION WITHOUT DELIVERY: Status: RESOLVED | Noted: 2017-10-15 | Resolved: 2017-10-16

## 2017-10-16 PROBLEM — Z37.9 NORMAL LABOR: Status: ACTIVE | Noted: 2017-10-16

## 2017-10-16 LAB
A1 MICROGLOB PLACENTAL VAG QL: POSITIVE
ABO + RH BLD: NORMAL
BASE DEFICIT BLDCOV-SCNC: 1.2 MMOL/L (ref 1.9–7.7)
BASE EXCESS BLDCOA CALC-SCNC: 0.3 MMOL/L (ref 0–3)
BDY SITE: ABNORMAL
BDY SITE: ABNORMAL
BLOOD GROUP ANTIBODIES SERPL: NORMAL
CONTROL LINE PRESENT?: YES
ERYTHROCYTE [DISTWIDTH] IN BLOOD BY AUTOMATED COUNT: 13.2 % (ref 11.9–14.6)
EXPIRATION DATE: NORMAL
HCO3 BLDCOA-SCNC: 27 MMOL/L (ref 22–26)
HCO3 BLDV-SCNC: 23 MMOL/L
HCT VFR BLD AUTO: 38.6 % (ref 35.8–46.3)
HGB BLD-MCNC: 13.3 G/DL (ref 11.7–15.4)
INTERNAL NEGATIVE CONTROL: NEGATIVE
KIT LOT NO.: NORMAL
MCH RBC QN AUTO: 30.2 PG (ref 26.1–32.9)
MCHC RBC AUTO-ENTMCNC: 34.5 G/DL (ref 31.4–35)
MCV RBC AUTO: 87.5 FL (ref 79.6–97.8)
PCO2 BLDCOA: 50 MMHG (ref 33–49)
PCO2 BLDCOV: 38 MMHG (ref 14.1–43.3)
PH BLDCOA: 7.34 [PH] (ref 7.21–7.31)
PH BLDCOV: 7.4 [PH] (ref 7.2–7.44)
PLATELET # BLD AUTO: 145 K/UL (ref 150–450)
PMV BLD AUTO: 12.4 FL (ref 10.8–14.1)
PO2 BLDCOA: 21 MMHG (ref 9–19)
PO2 BLDV: 31 MMHG (ref 30.4–57.2)
RBC # BLD AUTO: 4.41 M/UL (ref 4.05–5.25)
SERVICE CMNT-IMP: ABNORMAL
SERVICE CMNT-IMP: ABNORMAL
SPECIMEN EXP DATE BLD: NORMAL
WBC # BLD AUTO: 8.5 K/UL (ref 4.3–11.1)

## 2017-10-16 PROCEDURE — 82803 BLOOD GASES ANY COMBINATION: CPT

## 2017-10-16 PROCEDURE — 74011250636 HC RX REV CODE- 250/636

## 2017-10-16 PROCEDURE — A4300 CATH IMPL VASC ACCESS PORTAL: HCPCS | Performed by: NURSE ANESTHETIST, CERTIFIED REGISTERED

## 2017-10-16 PROCEDURE — 74011250637 HC RX REV CODE- 250/637: Performed by: OBSTETRICS & GYNECOLOGY

## 2017-10-16 PROCEDURE — 99283 EMERGENCY DEPT VISIT LOW MDM: CPT

## 2017-10-16 PROCEDURE — 74011258636 HC RX REV CODE- 258/636: Performed by: OBSTETRICS & GYNECOLOGY

## 2017-10-16 PROCEDURE — 00HU33Z INSERTION OF INFUSION DEVICE INTO SPINAL CANAL, PERCUTANEOUS APPROACH: ICD-10-PCS | Performed by: ANESTHESIOLOGY

## 2017-10-16 PROCEDURE — 75410000003 HC RECOV DEL/VAG/CSECN EA 0.5 HR

## 2017-10-16 PROCEDURE — 86900 BLOOD TYPING SEROLOGIC ABO: CPT | Performed by: OBSTETRICS & GYNECOLOGY

## 2017-10-16 PROCEDURE — 65270000029 HC RM PRIVATE

## 2017-10-16 PROCEDURE — 85027 COMPLETE CBC AUTOMATED: CPT | Performed by: OBSTETRICS & GYNECOLOGY

## 2017-10-16 PROCEDURE — 59025 FETAL NON-STRESS TEST: CPT

## 2017-10-16 PROCEDURE — 74011250636 HC RX REV CODE- 250/636: Performed by: OBSTETRICS & GYNECOLOGY

## 2017-10-16 PROCEDURE — 76060000078 HC EPIDURAL ANESTHESIA

## 2017-10-16 PROCEDURE — 74011000258 HC RX REV CODE- 258: Performed by: OBSTETRICS & GYNECOLOGY

## 2017-10-16 PROCEDURE — 84112 EVAL AMNIOTIC FLUID PROTEIN: CPT | Performed by: OBSTETRICS & GYNECOLOGY

## 2017-10-16 PROCEDURE — 77030003028 HC SUT VCRL J&J -A

## 2017-10-16 PROCEDURE — 75410000002 HC LABOR FEE PER 1 HR

## 2017-10-16 PROCEDURE — 75410000000 HC DELIVERY VAGINAL/SINGLE

## 2017-10-16 PROCEDURE — 77030018846 HC SOL IRR STRL H20 ICUM -A

## 2017-10-16 PROCEDURE — 77030014125 HC TY EPDRL BBMI -B: Performed by: NURSE ANESTHETIST, CERTIFIED REGISTERED

## 2017-10-16 PROCEDURE — 0KQM0ZZ REPAIR PERINEUM MUSCLE, OPEN APPROACH: ICD-10-PCS | Performed by: OBSTETRICS & GYNECOLOGY

## 2017-10-16 PROCEDURE — 74011000250 HC RX REV CODE- 250

## 2017-10-16 RX ORDER — ROPIVACAINE HYDROCHLORIDE 2 MG/ML
INJECTION, SOLUTION EPIDURAL; INFILTRATION; PERINEURAL AS NEEDED
Status: DISCONTINUED | OUTPATIENT
Start: 2017-10-16 | End: 2017-10-16 | Stop reason: HOSPADM

## 2017-10-16 RX ORDER — HYDROCODONE BITARTRATE AND ACETAMINOPHEN 5; 325 MG/1; MG/1
1 TABLET ORAL
Status: DISCONTINUED | OUTPATIENT
Start: 2017-10-16 | End: 2017-10-17 | Stop reason: HOSPADM

## 2017-10-16 RX ORDER — LIDOCAINE HYDROCHLORIDE 20 MG/ML
JELLY TOPICAL
Status: DISCONTINUED | OUTPATIENT
Start: 2017-10-16 | End: 2017-10-16 | Stop reason: HOSPADM

## 2017-10-16 RX ORDER — LIDOCAINE HYDROCHLORIDE 10 MG/ML
1 INJECTION INFILTRATION; PERINEURAL
Status: DISCONTINUED | OUTPATIENT
Start: 2017-10-16 | End: 2017-10-16 | Stop reason: HOSPADM

## 2017-10-16 RX ORDER — LIDOCAINE HYDROCHLORIDE AND EPINEPHRINE 15; 5 MG/ML; UG/ML
INJECTION, SOLUTION EPIDURAL AS NEEDED
Status: DISCONTINUED | OUTPATIENT
Start: 2017-10-16 | End: 2017-10-16 | Stop reason: HOSPADM

## 2017-10-16 RX ORDER — DIPHENHYDRAMINE HCL 25 MG
50 CAPSULE ORAL
Status: DISCONTINUED | OUTPATIENT
Start: 2017-10-16 | End: 2017-10-17 | Stop reason: HOSPADM

## 2017-10-16 RX ORDER — OXYTOCIN/RINGER'S LACTATE 30/500 ML
.5-2 PLASTIC BAG, INJECTION (ML) INTRAVENOUS
Status: DISCONTINUED | OUTPATIENT
Start: 2017-10-16 | End: 2017-10-16

## 2017-10-16 RX ORDER — OXYTOCIN/0.9 % SODIUM CHLORIDE 15/250 ML
250 PLASTIC BAG, INJECTION (ML) INTRAVENOUS ONCE
Status: COMPLETED | OUTPATIENT
Start: 2017-10-16 | End: 2017-10-16

## 2017-10-16 RX ORDER — DIPHENHYDRAMINE HCL 25 MG
25 CAPSULE ORAL
Status: DISCONTINUED | OUTPATIENT
Start: 2017-10-16 | End: 2017-10-17 | Stop reason: HOSPADM

## 2017-10-16 RX ORDER — DIPHENHYDRAMINE HCL 25 MG
25-50 CAPSULE ORAL
Status: DISCONTINUED | OUTPATIENT
Start: 2017-10-16 | End: 2017-10-16

## 2017-10-16 RX ORDER — BUTORPHANOL TARTRATE 1 MG/ML
1 INJECTION INTRAMUSCULAR; INTRAVENOUS
Status: DISCONTINUED | OUTPATIENT
Start: 2017-10-16 | End: 2017-10-16 | Stop reason: HOSPADM

## 2017-10-16 RX ORDER — IBUPROFEN 800 MG/1
800 TABLET ORAL
Status: DISCONTINUED | OUTPATIENT
Start: 2017-10-16 | End: 2017-10-17 | Stop reason: HOSPADM

## 2017-10-16 RX ORDER — SODIUM CHLORIDE 0.9 % (FLUSH) 0.9 %
5-10 SYRINGE (ML) INJECTION EVERY 8 HOURS
Status: DISCONTINUED | OUTPATIENT
Start: 2017-10-16 | End: 2017-10-16 | Stop reason: HOSPADM

## 2017-10-16 RX ORDER — HYDROCODONE BITARTRATE AND ACETAMINOPHEN 5; 325 MG/1; MG/1
2 TABLET ORAL
Status: DISCONTINUED | OUTPATIENT
Start: 2017-10-16 | End: 2017-10-17 | Stop reason: HOSPADM

## 2017-10-16 RX ORDER — FENTANYL CITRATE 50 UG/ML
INJECTION, SOLUTION INTRAMUSCULAR; INTRAVENOUS AS NEEDED
Status: DISCONTINUED | OUTPATIENT
Start: 2017-10-16 | End: 2017-10-16 | Stop reason: HOSPADM

## 2017-10-16 RX ORDER — DEXTROSE, SODIUM CHLORIDE, SODIUM LACTATE, POTASSIUM CHLORIDE, AND CALCIUM CHLORIDE 5; .6; .31; .03; .02 G/100ML; G/100ML; G/100ML; G/100ML; G/100ML
125 INJECTION, SOLUTION INTRAVENOUS CONTINUOUS
Status: DISCONTINUED | OUTPATIENT
Start: 2017-10-16 | End: 2017-10-16 | Stop reason: HOSPADM

## 2017-10-16 RX ORDER — MINERAL OIL
120 OIL (ML) ORAL
Status: COMPLETED | OUTPATIENT
Start: 2017-10-16 | End: 2017-10-16

## 2017-10-16 RX ORDER — FENTANYL CITRATE 50 UG/ML
INJECTION, SOLUTION INTRAMUSCULAR; INTRAVENOUS
Status: DISCONTINUED
Start: 2017-10-16 | End: 2017-10-16

## 2017-10-16 RX ORDER — OXYTOCIN/RINGER'S LACTATE 15/250 ML
250 PLASTIC BAG, INJECTION (ML) INTRAVENOUS ONCE
Status: DISCONTINUED | OUTPATIENT
Start: 2017-10-16 | End: 2017-10-16 | Stop reason: SDUPTHER

## 2017-10-16 RX ORDER — ROPIVACAINE HYDROCHLORIDE 2 MG/ML
INJECTION, SOLUTION EPIDURAL; INFILTRATION; PERINEURAL
Status: DISCONTINUED | OUTPATIENT
Start: 2017-10-16 | End: 2017-10-16 | Stop reason: HOSPADM

## 2017-10-16 RX ORDER — DOCUSATE SODIUM 100 MG/1
100 CAPSULE, LIQUID FILLED ORAL 2 TIMES DAILY
Status: DISCONTINUED | OUTPATIENT
Start: 2017-10-16 | End: 2017-10-17 | Stop reason: HOSPADM

## 2017-10-16 RX ORDER — SIMETHICONE 80 MG
80 TABLET,CHEWABLE ORAL
Status: DISCONTINUED | OUTPATIENT
Start: 2017-10-16 | End: 2017-10-17 | Stop reason: HOSPADM

## 2017-10-16 RX ORDER — ZOLPIDEM TARTRATE 5 MG/1
5 TABLET ORAL
Status: DISCONTINUED | OUTPATIENT
Start: 2017-10-16 | End: 2017-10-17 | Stop reason: HOSPADM

## 2017-10-16 RX ORDER — NALOXONE HYDROCHLORIDE 0.4 MG/ML
0.4 INJECTION, SOLUTION INTRAMUSCULAR; INTRAVENOUS; SUBCUTANEOUS AS NEEDED
Status: DISCONTINUED | OUTPATIENT
Start: 2017-10-16 | End: 2017-10-17 | Stop reason: HOSPADM

## 2017-10-16 RX ORDER — SODIUM CHLORIDE 0.9 % (FLUSH) 0.9 %
5-10 SYRINGE (ML) INJECTION AS NEEDED
Status: DISCONTINUED | OUTPATIENT
Start: 2017-10-16 | End: 2017-10-16 | Stop reason: HOSPADM

## 2017-10-16 RX ORDER — OXYTOCIN/RINGER'S LACTATE 15/250 ML
250 PLASTIC BAG, INJECTION (ML) INTRAVENOUS ONCE
Status: DISPENSED | OUTPATIENT
Start: 2017-10-16 | End: 2017-10-17

## 2017-10-16 RX ADMIN — Medication 15000 MILLI-UNITS/HR: at 14:12

## 2017-10-16 RX ADMIN — SODIUM CHLORIDE, SODIUM LACTATE, POTASSIUM CHLORIDE, AND CALCIUM CHLORIDE 500 ML: 600; 310; 30; 20 INJECTION, SOLUTION INTRAVENOUS at 07:30

## 2017-10-16 RX ADMIN — PENICILLIN G POTASSIUM 5 MILLION UNITS: 5000000 INJECTION, POWDER, FOR SOLUTION INTRAMUSCULAR; INTRAVENOUS at 03:43

## 2017-10-16 RX ADMIN — MISOPROSTOL 25 MCG: 100 TABLET ORAL at 03:45

## 2017-10-16 RX ADMIN — ROPIVACAINE HYDROCHLORIDE 8 ML: 2 INJECTION, SOLUTION EPIDURAL; INFILTRATION; PERINEURAL at 08:02

## 2017-10-16 RX ADMIN — LIDOCAINE HYDROCHLORIDE AND EPINEPHRINE 3 ML: 15; 5 INJECTION, SOLUTION EPIDURAL at 08:00

## 2017-10-16 RX ADMIN — MINERAL OIL 120 ML: 471.95 OIL ORAL at 13:55

## 2017-10-16 RX ADMIN — PENICILLIN G POTASSIUM 2.5 MILLION UNITS: 20000000 POWDER, FOR SOLUTION INTRAVENOUS at 07:45

## 2017-10-16 RX ADMIN — BUTORPHANOL TARTRATE 1 MG: 1 INJECTION, SOLUTION INTRAMUSCULAR; INTRAVENOUS at 05:45

## 2017-10-16 RX ADMIN — ROPIVACAINE HYDROCHLORIDE 8 ML/HR: 2 INJECTION, SOLUTION EPIDURAL; INFILTRATION; PERINEURAL at 08:02

## 2017-10-16 RX ADMIN — BENZOCAINE AND MENTHOL 1 SPRAY: 20; .5 SPRAY TOPICAL at 20:39

## 2017-10-16 RX ADMIN — IBUPROFEN 800 MG: 800 TABLET ORAL at 17:26

## 2017-10-16 RX ADMIN — PENICILLIN G POTASSIUM 2.5 MILLION UNITS: 20000000 POWDER, FOR SOLUTION INTRAVENOUS at 12:37

## 2017-10-16 RX ADMIN — DOCUSATE SODIUM 100 MG: 100 CAPSULE, LIQUID FILLED ORAL at 17:26

## 2017-10-16 RX ADMIN — WITCH HAZEL 1 PAD: 500 SOLUTION RECTAL; TOPICAL at 20:40

## 2017-10-16 RX ADMIN — FENTANYL CITRATE 100 MCG: 50 INJECTION, SOLUTION INTRAMUSCULAR; INTRAVENOUS at 08:02

## 2017-10-16 RX ADMIN — SODIUM CHLORIDE, SODIUM LACTATE, POTASSIUM CHLORIDE, CALCIUM CHLORIDE, AND DEXTROSE MONOHYDRATE 125 ML/HR: 600; 310; 30; 20; 5 INJECTION, SOLUTION INTRAVENOUS at 03:44

## 2017-10-16 RX ADMIN — SODIUM CHLORIDE, SODIUM LACTATE, POTASSIUM CHLORIDE, CALCIUM CHLORIDE, AND DEXTROSE MONOHYDRATE 125 ML/HR: 600; 310; 30; 20; 5 INJECTION, SOLUTION INTRAVENOUS at 12:37

## 2017-10-16 NOTE — PROGRESS NOTES
10/16/17 0719   Cervical Exam   Dilation (cm) 3   Eff 80 %   Station -3   Position Posterior   Pt requesting epidural. Will call MD on call.

## 2017-10-16 NOTE — IP AVS SNAPSHOT
[facility] [address] 
[phone] Patient: Kelsey Murray MRN: [id number] SUT:9[birth year]7691 You are allergic to the following No active allergies Immunizations Administered for This Admission Name Date Tdap  Deferred () Recent Documentation Height Weight Breastfeeding? BMI OB Status Smoking Status 1.626 m 81.6 kg Unknown 30.9 kg/m2 Recent pregnancy Former Smoker Emergency Contacts Name Discharge Info Relation Home Work Mobile Pamela Allen  Mother [14] [phone] About your hospitalization You were admitted on:  October 16, 2017 You last received care in the:  [address] [facility] You were discharged on:  October 17, 2017 Unit phone number:  [phone] Why you were hospitalized Your primary diagnosis was:  Normal Labor Providers Seen During Your Hospitalizations Provider Role Specialty Primary office phone Michael Mejia MD Attending Provider Obstetrics & Gynecology [phone] Merline Loredo MD Attending Provider Obstetrics & Gynecology [phone] Anastasiia Boucher MD Attending Provider Obstetrics & Gynecology [phone] Your Primary Care Physician (PCP) Primary Care Physician Office Phone Office Fax NONE ** None ** ** None ** Follow-up Information Follow up With Details Comments Contact Info None   None ([phone]) Patient stated that they have no PCP Anastasiia Boucher MD Schedule an appointment as soon as possible for a visit in 6 weeks As needed and/or, If symptoms worsen [address] [place] [id number] 
[phone] Current Discharge Medication List  
  
START taking these medications Dose & Instructions Dispensing Information Comments Morning Noon Evening Bedtime  
 ibuprofen 800 mg tablet Commonly known as:  MOTRIN Your last dose was: Your next dose is:    
   
   
 Dose:  800 mg Take 1 Tab by mouth every six (6) hours as needed for Pain. Quantity:  60 Tab Refills:  2 CONTINUE these medications which have NOT CHANGED Dose & Instructions Dispensing Information Comments Morning Noon Evening Bedtime CALCIUM 600 + D 600-125 mg-unit Tab Generic drug:  calcium-cholecalciferol (d3) Your last dose was: Your next dose is: Take  by mouth. Refills:  0 Iron 160 mg (50 mg iron) Tber tablet Generic drug:  ferrous sulfate ER Your last dose was: Your next dose is:    
   
   
 Dose:  1 Tab Take 1 Tab by mouth daily. Refills:  0 PNV#16-Iron Fum & PS-FA-OM-3 35-1-200 mg Cap Your last dose was: Your next dose is: Take  by mouth. Refills:  0 Where to Get Your Medications These medications were sent to [facility]/pharmacy #[id number]- [address] [address], [address] [facility] Phone:  [phone]  
  ibuprofen 800 mg tablet Discharge Instructions Discharge instruction to follow: Activity: Pelvis rest for 6 weeks No heavy lifting over 15 lbs for 2 weeks No driving for 2 weeks No push/pull motion such as sweeping or vacuuming for 2 weeks No tub baths for 6 weeks Continue using the hygenique wand after each void or bowel movement. If using sitz bath continue until comfortable stopping. If using evelyn-bottle continue to use until comfortable stopping. Change sanitary pad after each urination or bowel movement. Call MD for the following: 
    Fever over 101 F; pain not relieved by medication; foul smelling vaginal discharge or an increase in vaginal bleeding. Take medication as prescribed. Follow up with MD as order. After Your Delivery (the Postpartum Period): Care Instructions Your Care Instructions Congratulations on the birth of your baby. Like pregnancy, the  period can be a time of excitement, barb, and exhaustion. You may look at your wondrous little baby and feel happy. You may also be overwhelmed by your new sleep hours and new responsibilities. At first, babies often sleep during the days and are awake at night. They do not have a pattern or routine. They may make sudden gasps, jerk themselves awake, or look like they have crossed eyes. These are all normal, and they may even make you smile. In these first weeks after delivery, try to take good care of yourself. It may take 4 to 6 weeks to feel like yourself again, and possibly longer if you had a  birth. You will likely feel very tired for several weeks. Your days will be full of ups and downs, but lots of barb as well. Follow-up care is a key part of your treatment and safety. Be sure to make and go to all appointments, and call your doctor if you are having problems. It's also a good idea to know your test results and keep a list of the medicines you take. How can you care for yourself at home? Take care of your body after delivery · Use pads instead of tampons for the bloody flow that may last as long as 2 weeks. · Ease cramps with ibuprofen (Advil, Motrin). · Ease soreness of hemorrhoids and the area between your vagina and rectum with ice compresses or witch hazel pads. · Ease constipation by drinking lots of fluid and eating high-fiber foods. Ask your doctor about over-the-counter stool softeners. · Cleanse yourself with a gentle squeeze of warm water from a bottle instead of wiping with toilet paper. · Take a sitz bath in warm water several times a day. · Wear a good nursing bra. Ease sore and swollen breasts with warm, wet washcloths. · If you are not breastfeeding, use ice rather than heat for breast soreness. · Your period may not start for several months if you are breastfeeding. You may bleed more, and longer at first, than you did before you got pregnant. · Wait until you are healed (about 4 to 6 weeks) before you have sexual intercourse. Your doctor will tell you when it is okay to have sex. · Try not to travel with your baby for 5 or 6 weeks. If you take a long car trip, make frequent stops to walk around and stretch. Avoid exhaustion · Rest every day. Try to nap when your baby naps. · Ask another adult to be with you for a few days after delivery. · Plan for  if you have other children. · Stay flexible so you can eat at odd hours and sleep when you need to. Both you and your baby are making new schedules. · Plan small trips to get out of the house. Change can make you feel less tired. · Ask for help with housework, cooking, and shopping. Remind yourself that your job is to care for your baby. Know about help for postpartum depression · \"Baby blues\" are common for the first 1 to 2 weeks after birth. You may cry or feel sad or irritable for no reason. · Rest whenever you can. Being tired makes it harder to handle your emotions. · Go for walks with your baby. · Talk to your partner, friends, and family about your feelings. · If your symptoms last for more than a few weeks, or if you feel very depressed, ask your doctor for help. · Postpartum depression can be treated. Support groups and counseling can help. Sometimes medicine can also help. Stay healthy · Eat healthy foods so you have more energy, make good breast milk, and lose extra baby pounds. · If you breastfeed, avoid alcohol and drugs. Stay smoke-free. If you quit during pregnancy, congratulations. · Start daily exercise after 4 to 6 weeks, but rest when you feel tired. · Learn exercises to tone your belly. Do Kegel exercises to regain strength in your pelvic muscles. You can do these exercises while you stand or sit. ¨ Squeeze the same muscles you would use to stop your urine. Your belly and thighs should not move. ¨ Hold the squeeze for 3 seconds, and then relax for 3 seconds. ¨ Start with 3 seconds. Then add 1 second each week until you are able to squeeze for 10 seconds. ¨ Repeat the exercise 10 to 15 times for each session. Do three or more sessions each day. · Find a class for new mothers and new babies that has an exercise time. · If you had a  birth, give yourself a bit more time before you exercise, and be careful. When should you call for help? Call 911 anytime you think you may need emergency care. For example, call if: 
· You passed out (lost consciousness). Call your doctor now or seek immediate medical care if: 
· You have severe vaginal bleeding. This means you are passing blood clots and soaking through a pad each hour for 2 or more hours. · You are dizzy or lightheaded, or you feel like you may faint. · You have a fever. · You have new belly pain, or your pain gets worse. Watch closely for changes in your health, and be sure to contact your doctor if: 
· Your vaginal bleeding seems to be getting heavier. · You have new or worse vaginal discharge. · You feel sad, anxious, or hopeless for more than a few days. · You do not get better as expected. Where can you learn more? Go to http://archie-kyle.info/. Enter A461 in the search box to learn more about \"After Your Delivery (the Postpartum Period): Care Instructions. \" Current as of: 2017 Content Version: 11.3 © 2299-4086 [employer]. Care instructions adapted under license by [employer] (which disclaims liability or warranty for this information). If you have questions about a medical condition or this instruction, always ask your healthcare professional. [address] any warranty or liability for your use of this information. Vaginal Childbirth: Care Instructions Your Care Instructions Your body will slowly heal in the next few weeks. It is easy to get too tired and overwhelmed during the first weeks after your baby is born. Changes in your hormones can shift your mood without warning. You may find it hard to meet the extra demands on your energy and time. Take it easy on yourself. Follow-up care is a key part of your treatment and safety. Be sure to make and go to all appointments, and call your doctor if you are having problems. It's also a good idea to know your test results and keep a list of the medicines you take. How can you care for yourself at home? · Vaginal bleeding and cramps ¨ After delivery, you will have a bloody discharge from the vagina. This will turn pink within a week and then white or yellow after about 10 days. It may last for 2 to 4 weeks or longer, until the uterus has healed. Use pads instead of tampons until you stop bleeding. ¨ Do not worry if you pass some blood clots, as long as they are smaller than a golf ball. If you have a tear or stitches in your vaginal area, change the pad at least every 4 hours to prevent soreness and infection. ¨ You may have cramps for the first few days after childbirth. These are normal and occur as the uterus shrinks to normal size. Take an over-the-counter pain medicine, such as acetaminophen (Tylenol), ibuprofen (Advil, Motrin), or naproxen (Aleve), for cramps. Read and follow all instructions on the label. Do not take aspirin, because it can cause more bleeding. ¨ Do not take two or more pain medicines at the same time unless the doctor told you to. Many pain medicines have acetaminophen, which is Tylenol. Too much acetaminophen (Tylenol) can be harmful. · Stitches ¨ If you have stitches, they will dissolve on their own and do not need to be removed. Follow your doctor's instructions for cleaning the stitched area. ¨ Put ice or a cold pack on your painful area for 10 to 20 minutes at a time, several times a day, for the first few days. Put a thin cloth between the ice and your skin. ¨ Sit in a few inches of warm water (sitz bath) 3 times a day and after bowel movements. The warm water helps with pain and itching. If you do not have a tub, a warm shower might help. · Breast fullness ¨ Your breasts may overfill (engorge) in the first few days after delivery. To help milk flow and to relieve pain, warm your breasts in the shower or by using warm, moist towels before nursing. ¨ If you are not nursing, do not put warmth on your breasts or touch your breasts. Wear a tight bra or sports bra and use ice until the fullness goes away. This usually takes 2 to 3 days. ¨ Put ice or a cold pack on your breast after nursing to reduce swelling and pain. Put a thin cloth between the ice and your skin. · Activity ¨ Eat a balanced diet. Do not try to lose weight by cutting calories. Keep taking your prenatal vitamins, or take a multivitamin. ¨ Get as much rest as you can. Try to take naps when your baby sleeps during the day. ¨ Get some exercise every day. But do not do any heavy exercise until your doctor says it is okay. ¨ Wait until you are healed (about 4 to 6 weeks) before you have sexual intercourse. Your doctor will tell you when it is okay to have sex. ¨ Talk to your doctor about birth control. You can get pregnant even before your period returns. Also, you can get pregnant while you are breastfeeding. · Mental health ¨ It is normal to have some sadness, anxiety, sleeplessness, and mood swings after you go home. If you feel upset or hopeless for more than a few days or are having trouble doing the things you need to do, talk to your doctor. · Constipation and hemorrhoids ¨ Drink plenty of fluids, enough so that your urine is light yellow or clear like water.  If you have kidney, heart, or liver disease and have to limit fluids, talk with your doctor before you increase the amount of fluids you drink. ¨ Eat plenty of fiber each day. Have a bran muffin or bran cereal for breakfast, and try eating a piece of fruit for a mid-afternoon snack. ¨ For painful, itchy hemorrhoids, put ice or a cold pack on the area several times a day for 10 minutes at a time. Follow this by putting a warm compress on the area for another 10 to 20 minutes or by sitting in a shallow, warm bath. When should you call for help? Call 911 anytime you think you may need emergency care. For example, call if: 
· You are thinking of hurting yourself, your baby, or anyone else. · You have sudden, severe pain in your belly. · You passed out (lost consciousness). Call your doctor now or seek immediate medical care if: 
· You have severe vaginal bleeding. · You are soaking through a pad each hour for 2 or more hours. · Your vaginal bleeding seems to be getting heavier or is still bright red 4 days after delivery. · You are dizzy or lightheaded, or you feel like you may faint. · You are vomiting or cannot keep fluids down. · You have a fever. · You have new or more belly pain. · You pass tissue (not just blood). · Your vaginal discharge smells bad. · Your belly feels tender or full and hard. · Your breasts are continuously painful or red. · You feel sad, anxious, or hopeless for more than a few days. Watch closely for changes in your health, and be sure to contact your doctor if you have any problems. Where can you learn more? Go to http://archie-kyle.info/. Enter A701 in the search box to learn more about \"Vaginal Childbirth: Care Instructions. \" Current as of: March 16, 2017 Content Version: 11.3 © 4669-0992 [employer]. Care instructions adapted under license by [employer] (which disclaims liability or warranty for this information).  If you have questions about a medical condition or this instruction, always ask your healthcare professional. [address] [address] any warranty or liability for your use of this information. Discharge Orders None [employer] Announcement We are excited to announce that we are making your provider's discharge notes available to you in [employer]. You will see these notes when they are completed and signed by the physician that discharged you from your recent hospital stay. If you have any questions or concerns about any information you see in iPr[employer], please call the Health Information Department where you were seen or reach out to your Primary Care Provider for more information about your plan of care. Introducing [facility]! Keysha García introduces [employer] patient portal. Now you can access parts of your medical record, email your doctor's office, and request medication refills online. 1. In your internet browser, go to [email or web address] 2. Click on the First Time User? Click Here link in the Sign In box. You will see the New Member Sign Up page. 3. Enter your [employer] Access Code exactly as it appears below. You will not need to use this code after youve completed the sign-up process. If you do not sign up before the expiration date, you must request a new code. · [employer] Access Code: [email or web address] Expires: 11/26/2017 10:15 AM 
 
4. Enter the last four digits of your Social Security Number (xxxx) and Date of Birth (mm/dd/yyyy) as indicated and click Submit. You will be taken to the next sign-up page. 5. Create a iPrism Global ID. This will be your iPrism [employer] login ID and cannot be changed, so think of one that is secure and easy to remember. 6. Create a [employer] password. You can change your password at any time. 7. Enter your Password Reset Question and Answer. This can be used at a later time if you forget your password. 8. Enter your e-mail address.  You will receive e-mail notification when new information is available in Aplos Softwarehart. 9. Click Sign Up. You can now view and download portions of your medical record. 10. Click the Download Summary menu link to download a portable copy of your medical information. If you have questions, please visit the Frequently Asked Questions section of the Zuse website. Remember, Zuse is NOT to be used for urgent needs. For medical emergencies, dial 911. Now available from your iPhone and Android! General Information Please provide this summary of care documentation to your next provider. Patient Signature:  ____________________________________________________________ Date:  ____________________________________________________________  
  
[facility] Provider Signature:  ____________________________________________________________ Date:  ____________________________________________________________

## 2017-10-16 NOTE — H&P
History & Physical    Name: Suraj Cameron MRN: [id number]  SSN: [id number]    YOB: 1985  Age: 29 y.o. Sex: female    Chief c/o: srom    Subjective:     Estimated Date of Delivery: 10/17/17  OB History    Para Term  AB Living   1        SAB TAB Ectopic Molar Multiple Live Births              # Outcome Date GA Lbr Fan/2nd Weight Sex Delivery Anes PTL Lv   1 Current                   Ms. Reza Borjas is admitted with pregnancy at 39w6d for SROM. Prenatal course was normal. Please see prenatal records for details. pt has been having contractions for several days. Worse today with loss of fluid around 1:00 am.     Past Medical History:   Diagnosis Date    Anemia     Breast disorder     right breast lump-benign     Past Surgical History:   Procedure Laterality Date    HX REFRACTIVE SURGERY      HX WISDOM TEETH EXTRACTION       Social History     Occupational History    Not on file. Social History Main Topics    Smoking status: Former Smoker     Packs/day: 0.25     Years: 2.00     Quit date: 2/10/2017    Smokeless tobacco: Never Used    Alcohol use No    Drug use: No    Sexual activity: Yes     Partners: Male     Birth control/ protection: None     Family History   Problem Relation Age of Onset    Hypertension Father     Kidney Disease Father     Diabetes Neg Hx        No Known Allergies  Prior to Admission medications    Medication Sig Start Date End Date Taking? Authorizing Provider   PNV#16-Iron Fum & PS-FA-OM-3 35-1-200 mg cap Take  by mouth. Yes Historical Provider   calcium-cholecalciferol, d3, (CALCIUM 600 + D) 600-125 mg-unit tab Take  by mouth. Historical Provider   ferrous sulfate ER (IRON) 160 mg (50 mg iron) TbER tablet Take 1 Tab by mouth daily. Historical Provider        Review of Systems: A comprehensive review of systems was negative except for that written in the HPI.     Objective:     Vitals:  Vitals:    10/16/17 0243 10/16/17 0244   BP:  108/71   Pulse: (!) 105   Resp:  17   Temp:  97.8 °F (36.6 °C)   Weight: 81.6 kg (180 lb)    Height: 5' 4\" (1.626 m)         Physical Exam:  Patient without distress. Heart: Regular rate and rhythm  Lung: clear to auscultation throughout lung fields, no wheezes, no rales, no rhonchi and normal respiratory effort  Back: costovertebral angle tenderness absent  Abdomen: soft, nontender  Fundus: soft and non tender  Perineum: blood present, amniotic fluid present  Cervical Exam: 1 cm dilated    70% effaced    -2 station    Presenting Part: cephalic  Lower Extremities:  - Edema 1+   - Patellar Reflexes: 2+ bilaterally  Membranes:  Spontaneous Rupture of Membranes; Amniotic Fluid: clear fluid  Fetal Heart Rate: Reactive  Baseline: 140's per minute  Variability: moderate  Accelerations: yes  Decelerations: variable    Prenatal Labs:   Lab Results   Component Value Date/Time    Rubella, External immune  08/28/2017    GrBStrep, External positive  09/21/2017    HBsAg, External neg 08/28/2017    HIV, External neg  08/28/2017    RPR, External NR  08/28/2017    ABO,Rh B positive  08/28/2017         Assessment/Plan:     Active Problems:    Normal labor (10/16/2017)         Plan: 30 yo G1 at 39w6d with srom. Admit for Reassuring fetal status, Continue plan for vaginal delivery. Group B Strep was positive, will treat prophylactically with penicillin.  Will give cytotec 25 mcg X 1    Signed By:  Dewayne Phelan MD     October 16, 2017

## 2017-10-16 NOTE — PROGRESS NOTES
10/16/17 1021   Straight Cath   Straight Cath Nurse performed cath   Number of Attempts 1   Catheter Size 16 FR   Time Catheter Inserted 1020   Time Catheter Removed 1020   Urine 550 mL   Urine Assessment   Urinary Status Straight cath   Urine Color Yellow/straw   Urine Appearance Clear   Urine Odor None

## 2017-10-16 NOTE — ROUTINE PROCESS
SBAR IN Report: Mother    Verbal report received from [facility], RN on this patient, who is now being transferred from L&D (unit) for routine progression of care. The patient is not wearing a green \"Anesthesia-Duramorph\" band. Report consisted of patient's Situation, Background, Assessment and Recommendations (SBAR).  ID bands were compared with the identification form, and verified with the patient and transferring nurse. Information from the SBAR and Procedure Summary and the Ashley Report was reviewed with the transferring nurse; opportunity for questions and clarification provided.

## 2017-10-16 NOTE — PROGRESS NOTES
Patient care assumed from Gino Scott, [facility]. Report received.

## 2017-10-16 NOTE — PROGRESS NOTES
10/16/17 1347   Straight Cath   Straight Cath Nurse performed cath   Number of Attempts 1   Catheter Size 16 FR   Time Catheter Inserted 4467   Time Catheter Removed 1347   Urine 700 mL   Urine Assessment   Urinary Status Straight cath   Urine Color Yellow/straw   Urine Appearance Clear   Urine Odor None

## 2017-10-16 NOTE — ANESTHESIA PROCEDURE NOTES
Epidural Block    Start time: 10/16/2017 7:56 AM  End time: 10/16/2017 8:01 AM  Performed by: Mc Amaro  Authorized by: Mc Amaro     Pre-Procedure  Indication: at surgeon's request, procedure for pain and labor epidural    Preanesthetic Checklist: patient identified, risks and benefits discussed, anesthesia consent, site marked, patient being monitored, timeout performed and anesthesia consent    Timeout Time: 07:53        Epidural:   Patient position:  Seated  Prep region:  Lumbar  Prep: Chlorhexidine    Location:  L2-3    Needle and Epidural Catheter:   Needle Type:  Tuohy  Needle Gauge:  19 G  Injection Technique:  Loss of resistance using air and loss of resistance using saline  Attempts:  1  Catheter Size:  20 G  Catheter at Skin Depth (cm):  9  Depth in Epidural Space (cm):  5  Events: no blood with aspiration, no cerebrospinal fluid with aspiration, no paresthesia and negative aspiration test    Test Dose:  Lidocaine 1.5% w/ epi and negative    Assessment:   Catheter Secured:  Tegaderm and tape  Insertion:  Uncomplicated  Patient tolerance:  Patient tolerated the procedure well with no immediate complications

## 2017-10-16 NOTE — PROGRESS NOTES
SBAR OUT Report: Mother    Verbal report given to Truman Blackmon RN on this patient, who is now being transferred to [facility]U for routine progression of care. The patient is not wearing a green \"Anesthesia-Duramorph\" band. Report consisted of patient's Situation, Background, Assessment and Recommendations (SBAR). [place] ID bands were compared with the identification form, and verified with the patient and receiving nurse. Information from the SBAR, Procedure Summary and Recent Results and the [facility] Report was reviewed with the receiving nurse; opportunity for questions and clarification provided.

## 2017-10-16 NOTE — ANESTHESIA PREPROCEDURE EVALUATION
Anesthetic History   No history of anesthetic complications            Review of Systems / Medical History  Patient summary reviewed and pertinent labs reviewed    Pulmonary  Within defined limits                 Neuro/Psych   Within defined limits           Cardiovascular                  Exercise tolerance: >4 METS     GI/Hepatic/Renal  Within defined limits              Endo/Other        Obesity     Other Findings              Physical Exam    Airway  Mallampati: II  TM Distance: 4 - 6 cm  Neck ROM: normal range of motion   Mouth opening: Normal     Cardiovascular    Rhythm: regular  Rate: normal         Dental         Pulmonary  Breath sounds clear to auscultation               Abdominal         Other Findings            Anesthetic Plan    ASA: 2  Anesthesia type: epidural            Anesthetic plan and risks discussed with: Patient

## 2017-10-16 NOTE — PROGRESS NOTES
Rosanna Dotson at bedside at 1401. Ishaan MCCRAY at bedside at 4431. Assisted pt to sitting up on bedside at 0749. Timeout completed at 0286 with MAHENDRA DAVENPORT and myself at bedside. Test dose given at 0800. Negative reaction. Pt assisted to lying back in left tilt position. See anesthesia record for details. See vital sign flow sheet for BP. Tolerated procedure well.

## 2017-10-16 NOTE — PROGRESS NOTES
10/16/17 1348   Cervical Exam   Dilation (cm) 10   Eff 100 %   Station +1   Vaginal exam done by?   Dr. Chela Vieyraolic

## 2017-10-16 NOTE — PROGRESS NOTES
Pt admitted to room 436. Admission assessment completed, consents signed and witnessed. Discussed plan of care with patient and verbalized understanding. IV started. Labs drawn and sent. Reviewed \"pain\" goal with patient and realistic expectations of pain relief.

## 2017-10-16 NOTE — L&D DELIVERY NOTE
Delivery Summary    Patient: Carlos Negrete MRN: [id number]  SSN: [id number]    YOB: 1985  Age: 29 y.o. Sex: female       Information for the patient's :  Jonell Denver [[id number]]       Labor Events:    Labor: No   Rupture Date: 10/16/2017   Rupture Time: 1:15 AM   Rupture Type SROM   Amniotic Fluid Volume: Moderate    Amniotic Fluid Description: Clear None   Induction:         Augmentation: None   Labor Events: None     Cervical Ripening:           Delivery Events:  Episiotomy: None   Laceration(s): Second degree perineal     Repaired: Yes    Number of Repair Packets:     Suture Type and Size: Vicryl 3-0     Estimated Blood Loss (ml): 400ml       Delivery Date: 10/16/2017    Delivery Time: 2:09 PM  Delivery Type: Vaginal, Spontaneous Delivery  Sex:  Female     Gestational Age: 37w11d   Delivery Clinician:  Gena Feliz  Living Status: Living   Delivery Location: [place] [address]          APGARS  One minute Five minutes Ten minutes   Skin color: 0   1        Heart rate: 2   2        Grimace: 2   2        Muscle tone: 2   2        Breathin   2        Totals: 8   9            Presentation: Vertex    Position:   Occiput Anterior  Resuscitation Method:  Tactile Stimulation;Suctioning-bulb     Meconium Stained: None      Cord Vessels: 3 Vessels      Cord Events:    Cord Blood Sent?:  Yes    Blood Gases Sent?:  Yes    Placenta:  Date/Time: 10/16  2:25 PM  Removal: Spontaneous      Appearance: Normal;Intact     [place] Measurements:  Birth Weight: 7 lb 1.1 oz (3.205 kg)      Birth Length: 50.8 cm      Head Circumference: 33 cm      Chest Circumference: 33 cm     Abdominal Girth: Other Providers:   Jeana TAPIA;LISY GONCALVES;SHREE GOINS;SUSAN VALDIVAI;BOLIVAR FRAUSTO;LAXMI MCKEON, Obstetrician;Primary Nurse;Primary  Nurse; Anesthesiologist;Crna; Charge Nurse           Group B Strep:   Lab Results   Component Value Date/Time    GrBStrep, External positive 2017     Information for the patient's :  Koffi Garcia [[id number]]     Lab Results   Component Value Date/Time    ABO/Rh(D) B POSITIVE 10/16/2017 02:09 PM    SCOT IgG NEG 10/16/2017 02:09 PM     Lab Results   Component Value Date/Time    APH 7.344 (H) 10/16/2017 02:09 PM    APCO2 50 (H) 10/16/2017 02:09 PM    APO2 21 (H) 10/16/2017 02:09 PM    AHCO3 27 (H) 10/16/2017 02:09 PM    ABEC 0.3 10/16/2017 02:09 PM    EPHV 7.402 10/16/2017 02:09 PM    PCO2V 38 10/16/2017 02:09 PM    PO2V 31 10/16/2017 02:09 PM    HCO3V 23 10/16/2017 02:09 PM    EBDV 1.2 (L) 10/16/2017 02:09 PM    SITE CORD 10/16/2017 02:09 PM    SITE CORD 10/16/2017 02:09 PM    RSCOM na at 10 16 2017 2 25 28 PM. Not read back. 10/16/2017 02:09 PM    RSCOM na at 10 16 2017 2 25 43 PM. Not read back. 10/16/2017 02:09 PM      ............... Rai Guido Delivery Note        Lab Results   Component Value Date/Time    PH,CORD BLD ARTERIAL 7.344 10/16/2017 02:09 PM    PCO2,CORD BLD ARTERIAL 50 10/16/2017 02:09 PM    PO2,CORD BLD ARTERIAL 21 10/16/2017 02:09 PM    HCO3,CORD BLD ARTERIAL 27 10/16/2017 02:09 PM    BASE EXCESS,CBA 0.3 10/16/2017 02:09 PM    SITE CORD 10/16/2017 02:09 PM    SITE CORD 10/16/2017 02:09 PM    Respiratory comment: na at 10 16 2017 2 25 28 PM. Not read back. 10/16/2017 02:09 PM    Respiratory comment: na at 10 16 2017 2 25 43 PM. Not read back. 10/16/2017 02:09 PM            Lab Results   Component Value Date/Time    Rubella, External immune  2017    GrBStrep, External positive  2017            Procedure:  Patient became completely dilated and pushed. Episiotomy was not performed. Patient delivered head. Mouth and nares suctioned on the perineum with bulb syringe. Nuchal cord x1 was not able to be reduced so it was doubly clamped and cut. Shoulders delivered without any evidence of dystocia. Baby delivered in the bed, was dried. The cord was clamped and cut. Cord pH and cord blood was obtained.   The baby was placed on mom in a dry blanket or towel. The perineum was examined. She had a second degree laceration which was repaired in routine fashion. The placenta was delivered spontaneously. It was examined. It was intact with three vessels. Mom and baby are doing well.          Christel Parks MD  10/16/2017  2:55 PM

## 2017-10-16 NOTE — PROGRESS NOTES
Delivery Note    Dr Ani Martin arrived to bedside at [phone]. Pt positioned for delivery and set up at 1348. Spontaneous vaginal delivery of viable female infant. Apgar's 8 and 9. Perineum with 2nd degree and repaired. See delivery summary for details.

## 2017-10-17 VITALS
DIASTOLIC BLOOD PRESSURE: 64 MMHG | BODY MASS INDEX: 30.73 KG/M2 | SYSTOLIC BLOOD PRESSURE: 115 MMHG | WEIGHT: 180 LBS | RESPIRATION RATE: 18 BRPM | HEART RATE: 83 BPM | TEMPERATURE: 98 F | HEIGHT: 64 IN

## 2017-10-17 PROCEDURE — 74011250637 HC RX REV CODE- 250/637: Performed by: OBSTETRICS & GYNECOLOGY

## 2017-10-17 RX ORDER — IBUPROFEN 800 MG/1
800 TABLET ORAL
Qty: 60 TAB | Refills: 2 | Status: SHIPPED | OUTPATIENT
Start: 2017-10-17 | End: 2020-10-12

## 2017-10-17 RX ADMIN — HYDROCODONE BITARTRATE AND ACETAMINOPHEN 1 TABLET: 5; 325 TABLET ORAL at 05:37

## 2017-10-17 RX ADMIN — IBUPROFEN 800 MG: 800 TABLET ORAL at 00:20

## 2017-10-17 RX ADMIN — IBUPROFEN 800 MG: 800 TABLET ORAL at 09:43

## 2017-10-17 RX ADMIN — DOCUSATE SODIUM 100 MG: 100 CAPSULE, LIQUID FILLED ORAL at 08:47

## 2017-10-17 NOTE — PROGRESS NOTES
Report of care received from, [facility] RN. Bedside report given, pt denies further needs at present time.

## 2017-10-17 NOTE — LACTATION NOTE
This note was copied from a baby's chart. First visit with first time mom. Mom requesting early discharge at 24 hours. Mom reports infant feeding well. Assisted with undressing infant and gently waking infant. Assisted with football position on right breast. Infant latched well and fed x20 min. Nipple round on release. Mom then burped infant and placed infant in modified cradle hold on left breast. Good latch. Demonstrated manual lip flange. Feeding still in progress at end of visit. Encouraged to offer both breasts at each feeding. Reviewed expectations for first 24 hours of life. Reviewed baby's second night and normalcy of cluster feeding. Discussed signs of good latch, watch for early feedings cues, feed on demand, wake for feedings if needed. Discussed weight expectations, jaundice, and engorgement. Reviewed outpatient resources if needed. Encouraged to call as needed.

## 2017-10-17 NOTE — LACTATION NOTE
This note was copied from a baby's chart. Mom and baby are going home early today. Continue to offer the breast without restriction. Mom's milk should be fully in over the next few days. Reviewed engorgement precautions. Hand Expression has been demoed and written hand-out reviewed. As milk comes in baby will be more alert at the breast and swallows will be more obvious. Breasts may feel softer once baby has finished nursing. Baby should be back to birth weight by 3weeks of age. And then gain on average 1 oz per day for the next 2-3 months. Reviewed babies should be exclusively breastfeeding for the first 6 months and that breastfeeding should continue after introduction of appropriate complimentary foods after 6 months. Initial output should be at least 1 wet and 1 bowel movement for each day old baby is. By day 5-7 once milk is fully in baby will consistently have 6 or more soaking wet diapers and about 4 bowel movement. Some babies have a bowel movement with every feeding and some have 1-3 large bowel movements each day. Inadequate output may indicate inadequate feedings and should be reported to your Pediatrician. Bowel habits may change as baby gets older. Encouraged follow-up at Pediatrician in 1-2 days, no later than 1 week of age. Call [facility] for any questions as needed or to set up an OP visit. OP phone calls are returned within 24 hours. Breastfeeding Support Group is offered here monthly. Community Breastfeeding Resource List given.

## 2017-10-17 NOTE — DISCHARGE INSTRUCTIONS
Discharge instruction to follow: Activity: Pelvis rest for 6 weeks     No heavy lifting over 15 lbs for 2 weeks     No driving for 2 weeks     No push/pull motion such as sweeping or vacuuming for 2 weeks     No tub baths for 6 weeks    Continue using the hygenique wand after each void or bowel movement. If using sitz bath continue until comfortable stopping. If using evelyn-bottle continue to use until comfortable stopping. Change sanitary pad after each urination or bowel movement. Call MD for the following:      Fever over 101 F; pain not relieved by medication; foul smelling vaginal discharge or an increase in vaginal bleeding. Take medication as prescribed. Follow up with MD as order. After Your Delivery (the Postpartum Period): Care Instructions  Your Care Instructions  Congratulations on the birth of your baby. Like pregnancy, the  period can be a time of excitement, barb, and exhaustion. You may look at your wondrous little baby and feel happy. You may also be overwhelmed by your new sleep hours and new responsibilities. At first, babies often sleep during the days and are awake at night. They do not have a pattern or routine. They may make sudden gasps, jerk themselves awake, or look like they have crossed eyes. These are all normal, and they may even make you smile. In these first weeks after delivery, try to take good care of yourself. It may take 4 to 6 weeks to feel like yourself again, and possibly longer if you had a  birth. You will likely feel very tired for several weeks. Your days will be full of ups and downs, but lots of barb as well. Follow-up care is a key part of your treatment and safety. Be sure to make and go to all appointments, and call your doctor if you are having problems. It's also a good idea to know your test results and keep a list of the medicines you take. How can you care for yourself at home?   Take care of your body after delivery  · Use pads instead of tampons for the bloody flow that may last as long as 2 weeks. · Ease cramps with ibuprofen (Advil, Motrin). · Ease soreness of hemorrhoids and the area between your vagina and rectum with ice compresses or witch hazel pads. · Ease constipation by drinking lots of fluid and eating high-fiber foods. Ask your doctor about over-the-counter stool softeners. · Cleanse yourself with a gentle squeeze of warm water from a bottle instead of wiping with toilet paper. · Take a sitz bath in warm water several times a day. · Wear a good nursing bra. Ease sore and swollen breasts with warm, wet washcloths. · If you are not breastfeeding, use ice rather than heat for breast soreness. · Your period may not start for several months if you are breastfeeding. You may bleed more, and longer at first, than you did before you got pregnant. · Wait until you are healed (about 4 to 6 weeks) before you have sexual intercourse. Your doctor will tell you when it is okay to have sex. · Try not to travel with your baby for 5 or 6 weeks. If you take a long car trip, make frequent stops to walk around and stretch. Avoid exhaustion  · Rest every day. Try to nap when your baby naps. · Ask another adult to be with you for a few days after delivery. · Plan for  if you have other children. · Stay flexible so you can eat at odd hours and sleep when you need to. Both you and your baby are making new schedules. · Plan small trips to get out of the house. Change can make you feel less tired. · Ask for help with housework, cooking, and shopping. Remind yourself that your job is to care for your baby. Know about help for postpartum depression  · \"Baby blues\" are common for the first 1 to 2 weeks after birth. You may cry or feel sad or irritable for no reason. · Rest whenever you can. Being tired makes it harder to handle your emotions. · Go for walks with your baby.   · Talk to your partner, friends, and family about your feelings. · If your symptoms last for more than a few weeks, or if you feel very depressed, ask your doctor for help. · Postpartum depression can be treated. Support groups and counseling can help. Sometimes medicine can also help. Stay healthy  · Eat healthy foods so you have more energy, make good breast milk, and lose extra baby pounds. · If you breastfeed, avoid alcohol and drugs. Stay smoke-free. If you quit during pregnancy, congratulations. · Start daily exercise after 4 to 6 weeks, but rest when you feel tired. · Learn exercises to tone your belly. Do Kegel exercises to regain strength in your pelvic muscles. You can do these exercises while you stand or sit. ¨ Squeeze the same muscles you would use to stop your urine. Your belly and thighs should not move. ¨ Hold the squeeze for 3 seconds, and then relax for 3 seconds. ¨ Start with 3 seconds. Then add 1 second each week until you are able to squeeze for 10 seconds. ¨ Repeat the exercise 10 to 15 times for each session. Do three or more sessions each day. · Find a class for new mothers and new babies that has an exercise time. · If you had a  birth, give yourself a bit more time before you exercise, and be careful. When should you call for help? Call 911 anytime you think you may need emergency care. For example, call if:  · You passed out (lost consciousness). Call your doctor now or seek immediate medical care if:  · You have severe vaginal bleeding. This means you are passing blood clots and soaking through a pad each hour for 2 or more hours. · You are dizzy or lightheaded, or you feel like you may faint. · You have a fever. · You have new belly pain, or your pain gets worse. Watch closely for changes in your health, and be sure to contact your doctor if:  · Your vaginal bleeding seems to be getting heavier. · You have new or worse vaginal discharge. · You feel sad, anxious, or hopeless for more than a few days.   · You do not get better as expected. Where can you learn more? Go to http://archie-kyle.info/. Enter A461 in the search box to learn more about \"After Your Delivery (the Postpartum Period): Care Instructions. \"  Current as of: March 16, 2017  Content Version: 11.3  © 1858-6341 [employer]. Care instructions adapted under license by [employer] (which disclaims liability or warranty for this information). If you have questions about a medical condition or this instruction, always ask your healthcare professional. [address] any warranty or liability for your use of this information. Vaginal Childbirth: Care Instructions  Your Care Instructions  Your body will slowly heal in the next few weeks. It is easy to get too tired and overwhelmed during the first weeks after your baby is born. Changes in your hormones can shift your mood without warning. You may find it hard to meet the extra demands on your energy and time. Take it easy on yourself. Follow-up care is a key part of your treatment and safety. Be sure to make and go to all appointments, and call your doctor if you are having problems. It's also a good idea to know your test results and keep a list of the medicines you take. How can you care for yourself at home? · Vaginal bleeding and cramps  ¨ After delivery, you will have a bloody discharge from the vagina. This will turn pink within a week and then white or yellow after about 10 days. It may last for 2 to 4 weeks or longer, until the uterus has healed. Use pads instead of tampons until you stop bleeding. ¨ Do not worry if you pass some blood clots, as long as they are smaller than a golf ball. If you have a tear or stitches in your vaginal area, change the pad at least every 4 hours to prevent soreness and infection. ¨ You may have cramps for the first few days after childbirth.  These are normal and occur as the uterus shrinks to normal size. Take an over-the-counter pain medicine, such as acetaminophen (Tylenol), ibuprofen (Advil, Motrin), or naproxen (Aleve), for cramps. Read and follow all instructions on the label. Do not take aspirin, because it can cause more bleeding. ¨ Do not take two or more pain medicines at the same time unless the doctor told you to. Many pain medicines have acetaminophen, which is Tylenol. Too much acetaminophen (Tylenol) can be harmful. · Stitches  ¨ If you have stitches, they will dissolve on their own and do not need to be removed. Follow your doctor's instructions for cleaning the stitched area. ¨ Put ice or a cold pack on your painful area for 10 to 20 minutes at a time, several times a day, for the first few days. Put a thin cloth between the ice and your skin. ¨ Sit in a few inches of warm water (sitz bath) 3 times a day and after bowel movements. The warm water helps with pain and itching. If you do not have a tub, a warm shower might help. · Breast fullness  ¨ Your breasts may overfill (engorge) in the first few days after delivery. To help milk flow and to relieve pain, warm your breasts in the shower or by using warm, moist towels before nursing. ¨ If you are not nursing, do not put warmth on your breasts or touch your breasts. Wear a tight bra or sports bra and use ice until the fullness goes away. This usually takes 2 to 3 days. ¨ Put ice or a cold pack on your breast after nursing to reduce swelling and pain. Put a thin cloth between the ice and your skin. · Activity  ¨ Eat a balanced diet. Do not try to lose weight by cutting calories. Keep taking your prenatal vitamins, or take a multivitamin. ¨ Get as much rest as you can. Try to take naps when your baby sleeps during the day. ¨ Get some exercise every day. But do not do any heavy exercise until your doctor says it is okay. ¨ Wait until you are healed (about 4 to 6 weeks) before you have sexual intercourse.  Your doctor will tell you when it is okay to have sex. ¨ Talk to your doctor about birth control. You can get pregnant even before your period returns. Also, you can get pregnant while you are breastfeeding. · Mental health  ¨ It is normal to have some sadness, anxiety, sleeplessness, and mood swings after you go home. If you feel upset or hopeless for more than a few days or are having trouble doing the things you need to do, talk to your doctor. · Constipation and hemorrhoids  ¨ Drink plenty of fluids, enough so that your urine is light yellow or clear like water. If you have kidney, heart, or liver disease and have to limit fluids, talk with your doctor before you increase the amount of fluids you drink. ¨ Eat plenty of fiber each day. Have a bran muffin or bran cereal for breakfast, and try eating a piece of fruit for a mid-afternoon snack. ¨ For painful, itchy hemorrhoids, put ice or a cold pack on the area several times a day for 10 minutes at a time. Follow this by putting a warm compress on the area for another 10 to 20 minutes or by sitting in a shallow, warm bath. When should you call for help? Call 911 anytime you think you may need emergency care. For example, call if:  · You are thinking of hurting yourself, your baby, or anyone else. · You have sudden, severe pain in your belly. · You passed out (lost consciousness). Call your doctor now or seek immediate medical care if:  · You have severe vaginal bleeding. · You are soaking through a pad each hour for 2 or more hours. · Your vaginal bleeding seems to be getting heavier or is still bright red 4 days after delivery. · You are dizzy or lightheaded, or you feel like you may faint. · You are vomiting or cannot keep fluids down. · You have a fever. · You have new or more belly pain. · You pass tissue (not just blood). · Your vaginal discharge smells bad. · Your belly feels tender or full and hard. · Your breasts are continuously painful or red.   · You feel sad, anxious, or hopeless for more than a few days. Watch closely for changes in your health, and be sure to contact your doctor if you have any problems. Where can you learn more? Go to http://archie-kyle.info/. Enter R498 in the search box to learn more about \"Vaginal Childbirth: Care Instructions. \"  Current as of: March 16, 2017  Content Version: 11.3  © 6055-0811 [employer]. Care instructions adapted under license by [employer] (which disclaims liability or warranty for this information). If you have questions about a medical condition or this instruction, always ask your healthcare professional. [address] any warranty or liability for your use of this information.

## 2017-10-17 NOTE — DISCHARGE SUMMARY
Obstetrical Discharge Summary     Name: Lo Gordon MRN: [id number]  SSN: [id number]    YOB: 1985  Age: 29 y.o. Sex: female      Admit Date: 10/16/2017    Discharge Date: 10/17/2017     Admitting Physician: Rajni Liang MD     Attending Physician:  Ru Wayne MD     * Admission Diagnoses: contractions; Normal labor;Normal labor    * Discharge Diagnoses:   Information for the patient's :  Kayli Almendarez [[id number]]   Delivery of a 7 lb 1.1 oz (3.205 kg) female infant via Vaginal, Spontaneous Delivery on 10/16/2017 at 2:09 PM  by . Apgars were 8 and 9. Additional Diagnoses:   Hospital Problems as of 10/17/2017  Date Reviewed: 10/17/2017          Codes Class Noted - Resolved POA    * (Principal)Normal labor ICD-10-CM: O80, Z37.9  ICD-9-CM: 611  10/16/2017 - Present Unknown             Lab Results   Component Value Date/Time    ABO/Rh(D) B POSITIVE 10/16/2017 03:33 AM    Rubella, External immune  2017    GrBStrep, External positive  2017    ABO,Rh B positive  2017      Immunization History   Administered Date(s) Administered    Influenza Vaccine (Quad) Mdck Pf 2017       * Procedures:   * No surgery found *           * Discharge Condition: good    * Hospital Course: Normal hospital course following the delivery. * Disposition: Home    Discharge Medications:   Current Discharge Medication List      START taking these medications    Details   ibuprofen (MOTRIN) 800 mg tablet Take 1 Tab by mouth every six (6) hours as needed for Pain. Qty: 60 Tab, Refills: 2         CONTINUE these medications which have NOT CHANGED    Details   PNV#16-Iron Fum & PS-FA-OM-3 35-1-200 mg cap Take  by mouth.      calcium-cholecalciferol, d3, (CALCIUM 600 + D) 600-125 mg-unit tab Take  by mouth. ferrous sulfate ER (IRON) 160 mg (50 mg iron) TbER tablet Take 1 Tab by mouth daily. * Follow-up Care/Patient Instructions:   Activity: Activity as tolerated and No sex and nothing in the vagina for 6 weeks  Diet: Regular Diet  Wound Care: None needed    Follow-up Information     Follow up With Details Comments Contact Info    None   None ([phone]) Patient stated that they have no PCP             Signed By:  Randy Estrada MD     October 17, 2017

## 2017-10-17 NOTE — PROGRESS NOTES
Patient given Shanks PO for patient reported pain 6/10 in abdomen. See MAR. Call light within reach, bed wheels locked, bed in low position, and side rails up x 3. Patient encouraged to call for assistance. Family at bedside.

## 2017-10-17 NOTE — PROGRESS NOTES
Chart reviewed due to first time parent - no needs identified.  met with family and provided education on [facility] [place] [facility].  Family declined referral for home visit.     [facility], [address] [facility]

## 2017-10-17 NOTE — PROGRESS NOTES
Post-Partum Day Number 1 Progress/Discharge Note    Patient doing well post-partum without significant complaint. Voiding without difficulty, normal lochia. Requests discharge. Vitals:  Patient Vitals for the past 8 hrs:   BP Temp Pulse Resp   10/17/17 0712 115/64 98 °F (36.7 °C) 83 18     Temp (24hrs), Av.1 °F (36.7 °C), Min:97.9 °F (36.6 °C), Max:98.3 °F (36.8 °C)      Vital signs stable, afebrile. Exam:  Patient without distress. Abdomen soft, fundus firm at level of umbilicus, non tender               Perineum with normal lochia noted. Lower extremities are negative for swelling, cords or tenderness. Lab/Data Review: All lab results for the last 24 hours reviewed. Assessment and Plan:  Patient appears to be having uncomplicated post-partum course. Continue routine perineal care and maternal education. Plan discharge for today with follow up in our office in 6 weeks.

## 2017-10-17 NOTE — ANESTHESIA POSTPROCEDURE EVALUATION
Post-Anesthesia Evaluation and Assessment    Patient: Edna Monzon MRN: [id number]  SSN: [id number]    YOB: 1985  Age: 29 y.o. Sex: female       Cardiovascular Function/Vital Signs  Visit Vitals    /70 (BP 1 Location: Left arm, BP Patient Position: At rest)    Pulse (!) 109    Temp 36.8 °C (98.3 °F)    Resp 18    Ht 5' 4\" (1.626 m)    Wt 81.6 kg (180 lb)    Breastfeeding Unknown    BMI 30.9 kg/m2       Patient is status post epidural anesthesia for * No procedures listed *. Nausea/Vomiting: None    Postoperative hydration reviewed and adequate. Pain:  Pain Scale 1: Numeric (0 - 10) (10/16/17 2040)  Pain Intensity 1: 4 (10/16/17 2040)   Managed    Neurological Status:   Neuro (WDL): Within Defined Limits (10/16/17 1630)   At baseline    Mental Status and Level of Consciousness: Arousable    Pulmonary Status:   O2 Device: Room air (10/16/17 2025)   Adequate oxygenation and airway patent    Complications related to anesthesia: None    Post-anesthesia assessment completed.  No concerns    Signed By: Ortega Blanc MD     October 16, 2017

## 2017-10-17 NOTE — LACTATION NOTE

## 2017-10-17 NOTE — PROGRESS NOTES
Patient discharged to home after ID bands verified and 's code alert removed. Discharge teaching complete, patient verbalizes understanding; questions encouraged. Stable at discharge. Instructed patient to call out when infant is loaded in car seat and they are ready to leave unit, patient verbalized understanding.

## 2017-11-30 PROBLEM — Z37.9 NORMAL LABOR: Status: RESOLVED | Noted: 2017-10-16 | Resolved: 2017-11-30

## 2017-11-30 PROBLEM — O42.90 AMNIOTIC FLUID LEAKING: Status: RESOLVED | Noted: 2017-10-08 | Resolved: 2017-11-30

## 2020-10-12 PROBLEM — Z34.90 ENCOUNTER FOR SUPERVISION OF LOW-RISK PREGNANCY, ANTEPARTUM: Status: ACTIVE | Noted: 2020-10-12

## 2020-10-12 PROBLEM — Z34.00 SUPERVISION OF LOW-RISK FIRST PREGNANCY: Status: RESOLVED | Noted: 2017-08-29 | Resolved: 2020-10-12

## 2020-10-12 PROBLEM — K21.9 GERD (GASTROESOPHAGEAL REFLUX DISEASE): Status: ACTIVE | Noted: 2020-10-12

## 2020-10-12 PROBLEM — K59.00 CONSTIPATION: Status: ACTIVE | Noted: 2020-10-12

## 2020-10-12 PROBLEM — K64.8 OTHER HEMORRHOIDS: Status: ACTIVE | Noted: 2020-10-12

## 2020-11-28 ENCOUNTER — HOSPITAL ENCOUNTER (OUTPATIENT)
Age: 35
Discharge: HOME | End: 2020-11-28
Attending: OBSTETRICS & GYNECOLOGY | Admitting: OBSTETRICS & GYNECOLOGY
Payer: MEDICAID

## 2020-11-28 VITALS
HEIGHT: 64 IN | SYSTOLIC BLOOD PRESSURE: 134 MMHG | BODY MASS INDEX: 32.61 KG/M2 | RESPIRATION RATE: 18 BRPM | DIASTOLIC BLOOD PRESSURE: 84 MMHG | TEMPERATURE: 99.2 F | HEART RATE: 104 BPM

## 2020-11-28 PROBLEM — O26.893 LOW BACK PAIN DURING PREGNANCY, THIRD TRIMESTER: Status: ACTIVE | Noted: 2020-11-28

## 2020-11-28 PROBLEM — M54.50 LOW BACK PAIN DURING PREGNANCY, THIRD TRIMESTER: Status: ACTIVE | Noted: 2020-11-28

## 2020-11-28 LAB — FIBRONECTIN FETAL VAG QL: NEGATIVE

## 2020-11-28 PROCEDURE — 82731 ASSAY OF FETAL FIBRONECTIN: CPT

## 2020-11-28 PROCEDURE — 59025 FETAL NON-STRESS TEST: CPT

## 2020-11-28 PROCEDURE — 99284 EMERGENCY DEPT VISIT MOD MDM: CPT

## 2020-11-28 NOTE — DISCHARGE INSTRUCTIONS
Patient Education        Back Pain: Care Instructions  Your Care Instructions     Back pain has many possible causes. It is often related to problems with muscles and ligaments of the back. It may also be related to problems with the nerves, discs, or bones of the back. Moving, lifting, standing, sitting, or sleeping in an awkward way can strain the back. Sometimes you don't notice the injury until later. Arthritis is another common cause of back pain. Although it may hurt a lot, back pain usually improves on its own within several weeks. Most people recover in 12 weeks or less. Using good home treatment and being careful not to stress your back can help you feel better sooner. Follow-up care is a key part of your treatment and safety. Be sure to make and go to all appointments, and call your doctor if you are having problems. It's also a good idea to know your test results and keep a list of the medicines you take. How can you care for yourself at home? · Sit or lie in positions that are most comfortable and reduce your pain. Try one of these positions when you lie down:  ? Lie on your back with your knees bent and supported by large pillows. ? Lie on the floor with your legs on the seat of a sofa or chair. ? Lie on your side with your knees and hips bent and a pillow between your legs. ? Lie on your stomach if it does not make pain worse. · Do not sit up in bed, and avoid soft couches and twisted positions. Bed rest can help relieve pain at first, but it delays healing. Avoid bed rest after the first day of back pain. · Change positions every 30 minutes. If you must sit for long periods of time, take breaks from sitting. Get up and walk around, or lie in a comfortable position. · Try using a heating pad on a low or medium setting for 15 to 20 minutes every 2 or 3 hours. Try a warm shower in place of one session with the heating pad. · You can also try an ice pack for 10 to 15 minutes every 2 to 3 hours. Put a thin cloth between the ice pack and your skin. · Take pain medicines exactly as directed. ? If the doctor gave you a prescription medicine for pain, take it as prescribed. ? If you are not taking a prescription pain medicine, ask your doctor if you can take an over-the-counter medicine. · Take short walks several times a day. You can start with 5 to 10 minutes, 3 or 4 times a day, and work up to longer walks. Walk on level surfaces and avoid hills and stairs until your back is better. · Return to work and other activities as soon as you can. Continued rest without activity is usually not good for your back. · To prevent future back pain, do exercises to stretch and strengthen your back and stomach. Learn how to use good posture, safe lifting techniques, and proper body mechanics. When should you call for help? Call your doctor now or seek immediate medical care if:    · You have new or worsening numbness in your legs.     · You have new or worsening weakness in your legs. (This could make it hard to stand up.)     · You lose control of your bladder or bowels. Watch closely for changes in your health, and be sure to contact your doctor if:    · You have a fever, lose weight, or don't feel well.     · You do not get better as expected. Where can you learn more? Go to http://www.greenwood.com/  Enter I594 in the search box to learn more about \"Back Pain: Care Instructions. \"  Current as of: March 2, 2020               Content Version: 12.6  © 3630-4963 [employer]. Care instructions adapted under license by [employer] (which disclaims liability or warranty for this information). If you have questions about a medical condition or this instruction, always ask your healthcare professional. [place] [address] any warranty or liability for your use of this information.        Patient Education        Pregnancy Precautions: Care Instructions  Your Care Instructions     There is no sure way to prevent labor before your due date ( labor) or to prevent most other pregnancy problems. But there are things you can do to increase your chances of a healthy pregnancy. Go to your appointments, follow your doctor's advice, and take good care of yourself. Eat well, and exercise (if your doctor agrees). And make sure to drink plenty of water. Follow-up care is a key part of your treatment and safety. Be sure to make and go to all appointments, and call your doctor if you are having problems. It's also a good idea to know your test results and keep a list of the medicines you take. How can you care for yourself at home? · Make sure you go to your prenatal appointments. At each visit, your doctor will check your blood pressure. Your doctor will also check to see if you have protein in your urine. High blood pressure and protein in urine are signs of preeclampsia. This condition can be dangerous for you and your baby. · Drink plenty of fluids, enough so that your urine is light yellow or clear like water. Dehydration can cause contractions. If you have kidney, heart, or liver disease and have to limit fluids, talk with your doctor before you increase the amount of fluids you drink. · Tell your doctor right away if you notice any symptoms of an infection, such as:  ? Burning when you urinate. ? A foul-smelling discharge from your vagina. ? Vaginal itching. ? Unexplained fever. ? Unusual pain or soreness in your uterus or lower belly. · Eat a balanced diet. Include plenty of foods that are high in calcium and iron. ? Foods high in calcium include milk, cheese, yogurt, almonds, and broccoli. ? Foods high in iron include red meat, shellfish, poultry, eggs, beans, raisins, whole-grain bread, and leafy green vegetables. · Do not smoke. If you need help quitting, talk to your doctor about stop-smoking programs and medicines.  These can increase your chances of quitting for good. · Do not drink alcohol or use illegal drugs. · Follow your doctor's directions about activity. Your doctor will let you know how much, if any, exercise you can do. · Ask your doctor if you can have sex. If you are at risk for early labor, your doctor may ask you to not have sex. · Take care to prevent falls. During pregnancy, your joints are loose, and your balance is off. Sports such as bicycling, skiing, or in-line skating can increase your risk of falling. And don't ride horses or motorcycles, dive, water ski, scuba dive, or parachute jump while you are pregnant. · Avoid getting very hot. Do not use saunas or hot tubs. Avoid staying out in the sun in hot weather for long periods. Take acetaminophen (Tylenol) to lower a high fever. · Do not take any over-the-counter or herbal medicines or supplements without talking to your doctor or pharmacist first.  When should you call for help? Call 911 anytime you think you may need emergency care. For example, call if:    · You passed out (lost consciousness).     · You have a seizure.     · You have severe vaginal bleeding.     · You have severe pain in your belly or pelvis.     · You have had fluid gushing or leaking from your vagina and you know or think the umbilical cord is bulging into your vagina. If this happens, immediately get down on your knees so your rear end (buttocks) is higher than your head. This will decrease the pressure on the cord until help arrives. Call your doctor now or seek immediate medical care if:    · You have signs of preeclampsia, such as:  ? Sudden swelling of your face, hands, or feet. ? New vision problems (such as dimness, blurring, or seeing spots). ? A severe headache.     · You have any vaginal bleeding.     · You have belly pain or cramping.     · You have a fever.     · You have had regular contractions (with or without pain) for an hour.  This means that you have 8 or more within 1 hour or 4 or more in 20 minutes after you change your position and drink fluids.     · You have a sudden release of fluid from your vagina.     · You have low back pain or pelvic pressure that does not go away.     · You notice that your baby has stopped moving or is moving much less than normal.   Watch closely for changes in your health, and be sure to contact your doctor if you have any problems. Where can you learn more? Go to http://www.greenwood.com/  Enter Y951 in the search box to learn more about \"Pregnancy Precautions: Care Instructions. \"  Current as of: February 11, 2020               Content Version: 12.6  © 7428-9057 [employer]. Care instructions adapted under license by [employer] (which disclaims liability or warranty for this information). If you have questions about a medical condition or this instruction, always ask your healthcare professional. [address] any warranty or liability for your use of this information.

## 2020-11-28 NOTE — H&P
Chief Complaint   Patient presents with    Back Pain     32w       37 y.o. female  at 32w0d  weeks gestation who requests evaluation for one day history of mod to severe low back pain she describes as constant, but aggravated by activities such as stting/standing and transferring in and out of car. No uti sx, n/v/d. No vb or lof. She has been home from work over holiday, doing more with her active toddler  Her pregnancy issues include: none    Fetal movement has beennormal .    HISTORY:  OB History    Para Term  AB Living   3 1 1   1 1   SAB TAB Ectopic Molar Multiple Live Births   1       0 1      # Outcome Date GA Lbr Fan/2nd Weight Sex Delivery Anes PTL Lv   3 Current            2 SAB            1 Term 10/16/17 39w6d 10:03 / 00:21 3.205 kg F Vag-Spont EPIDURAL AN N LETICIA       Past Surgical History:   Procedure Laterality Date    HX DILATION AND CURETTAGE      for MAB around 9wks     HX REFRACTIVE SURGERY      HX WISDOM TEETH EXTRACTION         Past Medical History:   Diagnosis Date    Anemia     Breast disorder     right breast lump-benign    Supervision of low-risk first pregnancy 2017    LATE TX at 33wks from [facility] PN labs pending; glucola wnl 1st Trim screen wnl, NT 1.1 mm Records reviewed:  LMP 10/1/17; [facility] 10/17/17 Placenta post/high; growth at [facility] there wnl Bps wnl in [facility] although several higher normotensive (133/81, 131/82, 127/86)   Pap---not performed in [facility] (will get Pp) ;   Cxs neg         No Known Allergies    Family History   Problem Relation Age of Onset    Hypertension Father     Kidney Disease Father     Diabetes Neg Hx        Social History     Socioeconomic History    Marital status:      Spouse name: Not on file    Number of children: Not on file    Years of education: Not on file    Highest education level: Not on file   Occupational History    Not on file   Social Needs    Financial resource strain: Not on file    Food insecurity Worry: Not on file     Inability: Not on file    Transportation needs     Medical: Not on file     Non-medical: Not on file   Tobacco Use    Smoking status: Former Smoker     Packs/day: 0.25     Years: 2.00     Pack years: 0.50     Last attempt to quit: 2/10/2017     Years since quitting: 3.8    Smokeless tobacco: Never Used   Substance and Sexual Activity    Alcohol use: No    Drug use: No    Sexual activity: Yes     Partners: Male     Birth control/protection: None   Lifestyle    Physical activity     Days per week: Not on file     Minutes per session: Not on file    Stress: Not on file   Relationships    Social connections     Talks on phone: Not on file     Gets together: Not on file     Attends Synagogue service: Not on file     Active member of club or organization: Not on file     Attends meetings of clubs or organizations: Not on file     Relationship status: Not on file    Intimate partner violence     Fear of current or ex partner: Not on file     Emotionally abused: Not on file     Physically abused: Not on file     Forced sexual activity: Not on file   Other Topics Concern     Service Not Asked    Blood Transfusions Not Asked    Caffeine Concern Not Asked    Occupational Exposure Not Asked   HALSCION Gash Hazards Not Asked    Sleep Concern Not Asked    Stress Concern Not Asked    Weight Concern Not Asked    Special Diet Not Asked    Back Care Not Asked    Exercise Not Asked    Bike Helmet Not Asked    Seat Belt Not Asked    Self-Exams Not Asked   Social History Narrative    Not on file       ROS:  Negative:   negative 10 point ROS except as noted in HPI    Positive:   per hpi    PHYSICAL EXAM:  Blood pressure 134/84, pulse (!) 104, temperature 99.2 °F (37.3 °C), resp. rate 18, height 5' 4\" (1.626 m), unknown if currently breastfeeding. The patient appears well, alert, oriented x 3. Appropriate affect. Lungs are clear. Heart RRR, no murmurs.    Abdomen soft, non-tender, no rebound/guarding, normoactive bs. Fundus soft and non tender  ttp over both SI joints. This reproduces pain. Skin warm, dry, no rashes  Ext no edema, DTR's normal    Cervix: Closed/Thick/High    Fetal Heart Rate: Reactive  Baseline: 140 per minute  Variability: moderate  Accelerations: yes  Decelerations: none  Uterine contractions: none   I personally reviewed and interpreted the FHR tracing    No results found for this or any previous visit (from the past 24 hour(s)). Tests performed and reviwed:   UA: normal:      FFN:  Collected and sent      I have personally reviewed the patient's history, prenatal record, and pertinent test results. vital sign trends, previous provider notes support my clinical impression. Assessment:  37 y.o. female at 32w0d  low back pain, probably sacroiliitis    Plan:  Findings and test results were discussed. D/c home if ffn negative, comfort measures discussed including mat support belt.     Signed By:  Jenna Butler MD     November 28, 2020

## 2020-11-28 NOTE — PROGRESS NOTES
Pt to room ISA bed 1 for triage with chief complaint of low back pain. Assessment begins, EFM and [place] applied to a soft non tender abdomen and tracing well. Dr David Puentes called to assess patient.

## 2020-12-16 PROBLEM — M54.50 LOW BACK PAIN DURING PREGNANCY, THIRD TRIMESTER: Status: RESOLVED | Noted: 2020-11-28 | Resolved: 2020-12-16

## 2020-12-16 PROBLEM — K59.00 CONSTIPATION: Status: RESOLVED | Noted: 2020-10-12 | Resolved: 2020-12-16

## 2020-12-16 PROBLEM — O26.893 LOW BACK PAIN DURING PREGNANCY, THIRD TRIMESTER: Status: RESOLVED | Noted: 2020-11-28 | Resolved: 2020-12-16

## 2021-01-08 ENCOUNTER — HOSPITAL ENCOUNTER (OUTPATIENT)
Age: 36
Discharge: HOME | End: 2021-01-08
Attending: OBSTETRICS & GYNECOLOGY | Admitting: OBSTETRICS & GYNECOLOGY
Payer: MEDICAID

## 2021-01-08 VITALS — TEMPERATURE: 98.1 F | SYSTOLIC BLOOD PRESSURE: 127 MMHG | DIASTOLIC BLOOD PRESSURE: 85 MMHG | HEART RATE: 113 BPM

## 2021-01-08 PROBLEM — N89.8 VAGINAL DISCHARGE IN PREGNANCY IN THIRD TRIMESTER: Status: ACTIVE | Noted: 2021-01-08

## 2021-01-08 PROBLEM — O26.893 VAGINAL DISCHARGE IN PREGNANCY IN THIRD TRIMESTER: Status: ACTIVE | Noted: 2021-01-08

## 2021-01-08 LAB
A1 MICROGLOB PLACENTAL VAG QL: NEGATIVE
CONTROL LINE PRESENT?: NORMAL
EXPIRATION DATE: NORMAL
INTERNAL NEGATIVE CONTROL: NORMAL
KIT LOT NO.: NORMAL

## 2021-01-08 PROCEDURE — 99283 EMERGENCY DEPT VISIT LOW MDM: CPT

## 2021-01-08 PROCEDURE — 84112 EVAL AMNIOTIC FLUID PROTEIN: CPT

## 2021-01-08 PROCEDURE — 84112 EVAL AMNIOTIC FLUID PROTEIN: CPT | Performed by: OBSTETRICS & GYNECOLOGY

## 2021-01-09 NOTE — DISCHARGE INSTRUCTIONS
Patient Education        Week 45 of Your Pregnancy: Care Instructions  Your Care Instructions     Believe it or not, your baby is almost here. You may have ideas about your baby's personality because of how much he or she moves. Or you may have noticed how he or she responds to sounds, warmth, cold, and light. You may even know what kind of music your baby likes. By now, you have a better idea of what to expect during delivery. You may have talked about your birth preferences with your doctor. But even if you want a vaginal birth, it is a good idea to learn about  births.  birth means that your baby is born through a cut (incision) in your lower belly. It is sometimes the best choice for the health of the baby and the mother. This care sheet can help you understand  births. It also gives you information about what to expect after your baby is born. And it helps you understand more about postpartum depression. Follow-up care is a key part of your treatment and safety. Be sure to make and go to all appointments, and call your doctor if you are having problems. It's also a good idea to know your test results and keep a list of the medicines you take. How can you care for yourself at home? Learn about  birth  · Most C-sections are unplanned. They are done because of problems that occur during labor. These problems might include:  ? Labor that slows or stops. ? High blood pressure or other problems for the mother. ? Signs of distress in the baby. These signs may include a very fast or slow heart rate. · Although most mothers and babies do well after , it is major surgery. It has more risks than a vaginal delivery. · In some cases, a planned  may be safer than a vaginal delivery. This may be the case if:  ? The mother has a health problem, such as a heart condition. ? The baby isn't in a head-down position for delivery. This is called a breech position. ?  The uterus has scars from past surgeries. This could increase the chance of a tear in the uterus. ? There is a problem with the placenta. ? The mother has an infection, such as genital herpes, that could be spread to the baby. ? The mother is having twins or more. ? The baby weighs 9 to 10 pounds or more. · Because of the risks of , planned C-sections generally should be done only for medical reasons. And a planned  should be done at 39 weeks or later unless there is a medical reason to do it sooner. Know what to expect after delivery, and plan for the first few weeks at home  · You, your baby, and your partner or  will get identification bands. Only people with matching bands can  the baby from the nursery. · You will learn how to feed, diaper, and bathe your baby. And you will learn how to care for the umbilical cord stump. If your baby will be circumcised, you will also learn how to care for that. · Ask people to wait to visit you until you are at home. And ask them to wash their hands before they touch your baby. · Make sure you have another adult in your home for at least 2 or 3 days after the birth. · During the first 2 weeks, limit when friends and family can visit. · Do not allow visitors who have colds or infections. Make sure all visitors are up to date with their vaccinations. Never let anyone smoke around your baby. · Try to nap when the baby naps. Be aware of postpartum depression  · \"Baby blues\" are common for the first 1 to 2 weeks after birth. You may cry or feel sad or irritable for no reason. · For some women, these feelings last longer and are more intense. This is called postpartum depression. · If your symptoms last for more than a few weeks or you feel very depressed, ask your doctor for help. · Postpartum depression can be treated. Support groups and counseling can help. Sometimes medicine can also help. Where can you learn more?   Go to http://www.gray.com/  Enter B044 in the search box to learn more about \"Week 38 of Your Pregnancy: Care Instructions. \"  Current as of: February 11, 2020               Content Version: 12.6  © 9528-7218 [employer]. Care instructions adapted under license by [employer] (which disclaims liability or warranty for this information). If you have questions about a medical condition or this instruction, always ask your healthcare professional. [place] [address] any warranty or liability for your use of this information.

## 2021-01-09 NOTE — PROGRESS NOTES
Patient presents to triage with complaints of leakage of fluids. Patient states that she had a large of fluids while in the shower. Patient states that she is having mild period like cramps. No complaints of DFM or VG. US and [place] placed on soft, non-tender abdomen.

## 2021-01-09 NOTE — H&P
History & Physical    Name: Susanna Borden MRN: [id number]  SSN: [id number]    YOB: 1985  Age: 37 y.o. Sex: female        Subjective: LOF  38 yo  at 37+6wks presents with a gush at 8pm while in the shower. She denies leakage while coming to the hospital. She reports \"menstral crampy\" pain and normal FM. She denies vb. Pregnancy has been c/b late transfer of care from [facility] and hemorrhoids. GBS is negative. Estimated Date of Delivery: 21  OB History    Para Term  AB Living   3 1 1   1 1   SAB TAB Ectopic Molar Multiple Live Births   1       0 1      # Outcome Date GA Lbr Fan/2nd Weight Sex Delivery Anes PTL Lv   3 Current            2 SAB            1 Term 10/16/17 39w6d 10:03 / 00:21 3.205 kg F Vag-Spont EPIDURAL AN N LETICIA       Past Medical History:   Diagnosis Date    Anemia     Breast disorder     right breast lump-benign    Supervision of low-risk first pregnancy 2017    LATE TX at 33wks from [facility] PN labs pending; glucola wnl 1st Trim screen wnl, NT 1.1 mm Records reviewed:  LMP 10/1/17; [facility] 10/17/17 Placenta post/high; growth at [facility] there wnl Bps wnl in [facility] although several higher normotensive (133/81, 131/82, 127/86)   Pap---not performed in [facility] (will get Pp) ;   Cxs neg       Past Surgical History:   Procedure Laterality Date    HX DILATION AND CURETTAGE      for MAB around 9wks     HX REFRACTIVE SURGERY      HX WISDOM TEETH EXTRACTION       Social History     Occupational History    Not on file   Tobacco Use    Smoking status: Former Smoker     Packs/day: 0.25     Years: 2.00     Pack years: 0.50     Quit date: 2/10/2017     Years since quitting: 3.9    Smokeless tobacco: Never Used   Substance and Sexual Activity    Alcohol use: No    Drug use: No    Sexual activity: Yes     Partners: Male     Birth control/protection: None     Family History   Problem Relation Age of Onset    Hypertension Father     Kidney Disease Father     Diabetes Neg Hx        No Known Allergies  Prior to Admission medications    Medication Sig Start Date End Date Taking? Authorizing Provider   hydrocortisone (ANUSOL-HC) 2.5 % rectal cream Insert  into rectum four (4) times daily. 20   Jacob Calvillo MD   omeprazole (PRILOSEC) 40 mg capsule Take 1 Cap by mouth daily. 10/12/20   Candelario Lorenzo MD   PNV#16-Iron Fum & PS-FA-OM-3 35-1-200 mg cap Take  by mouth. Provider, Historical        Review of Systems: Pertinent items are noted in HPI. 10 point ROS is otherwise negative. Objective:     Vitals:  Vitals:    21 2046   BP: 127/85   Pulse: (!) 113   Temp: 98.1 °F (36.7 °C)        Physical Exam:  Patient without distress. Abdomen: soft, nontender  Fundus: soft and non tender  Perineum: blood absent, amniotic fluid absent  Cervical Exam: 1 cm dilated    80% effaced    -3 station    Lower Extremities:  - Edema 1+  Amnisure: Negative  Membranes:  Intact  Fetal Heart Rate: Baseline: 130s per minute  Variability: moderate  Accelerations: yes  Decelerations: none  Uterine contractions: none    Prenatal Labs:   Lab Results   Component Value Date/Time    ABO/Rh(D) B POSITIVE 10/16/2017 03:33 AM    Rubella, External immune  2017    GrBStrep, External positive  2017    HBsAg, External neg 2017    HIV, External neg  2017    RPR, External NR  2017    ABO,Rh B positive  2017         Assessment/Plan: 38 yo  at 37+6wks presented with LOF x 1 episode. Amnisure resulted negative. FHR category 1.       Active Problems:    Vaginal discharge in pregnancy in third trimester (2021)         Plan:   Discharge  Routine OB f/u  No new meds

## 2021-01-13 PROBLEM — O26.893 VAGINAL DISCHARGE IN PREGNANCY IN THIRD TRIMESTER: Status: RESOLVED | Noted: 2021-01-08 | Resolved: 2021-01-13

## 2021-01-13 PROBLEM — N89.8 VAGINAL DISCHARGE IN PREGNANCY IN THIRD TRIMESTER: Status: RESOLVED | Noted: 2021-01-08 | Resolved: 2021-01-13

## 2021-01-17 ENCOUNTER — HOSPITAL ENCOUNTER (INPATIENT)
Age: 36
LOS: 1 days | Discharge: HOME | DRG: 560 | End: 2021-01-18
Attending: OBSTETRICS & GYNECOLOGY | Admitting: OBSTETRICS & GYNECOLOGY
Payer: MEDICAID

## 2021-01-17 ENCOUNTER — ANESTHESIA EVENT (OUTPATIENT)
Dept: LABOR AND DELIVERY | Age: 36
DRG: 560 | End: 2021-01-17
Payer: MEDICAID

## 2021-01-17 ENCOUNTER — ANESTHESIA (OUTPATIENT)
Dept: LABOR AND DELIVERY | Age: 36
DRG: 560 | End: 2021-01-17
Payer: MEDICAID

## 2021-01-17 PROBLEM — Z3A.39 39 WEEKS GESTATION OF PREGNANCY: Status: ACTIVE | Noted: 2021-01-17

## 2021-01-17 PROBLEM — Z34.90 ENCOUNTER FOR PLANNED INDUCTION OF LABOR: Status: ACTIVE | Noted: 2021-01-17

## 2021-01-17 LAB
ABO + RH BLD: NORMAL
ARTERIAL PATENCY WRIST A: ABNORMAL
ARTERIAL PATENCY WRIST A: ABNORMAL
BASE DEFICIT BLD-SCNC: 2 MMOL/L
BASE DEFICIT BLDV-SCNC: 1 MMOL/L
BDY SITE: ABNORMAL
BDY SITE: ABNORMAL
BLOOD GROUP ANTIBODIES SERPL: NORMAL
CO2 BLD-SCNC: 25 MMOL/L
CO2 BLD-SCNC: 28 MMOL/L
COLLECT TIME,HTIME: 1723
COLLECT TIME,HTIME: 1723
ERYTHROCYTE [DISTWIDTH] IN BLOOD BY AUTOMATED COUNT: 14.1 % (ref 11.9–14.6)
GAS FLOW.O2 O2 DELIVERY SYS: ABNORMAL L/MIN
GAS FLOW.O2 O2 DELIVERY SYS: ABNORMAL L/MIN
HCO3 BLD-SCNC: 26.1 MMOL/L (ref 22–26)
HCO3 BLDV-SCNC: 24.2 MMOL/L (ref 23–28)
HCT VFR BLD AUTO: 47.9 % (ref 35.8–46.3)
HGB BLD-MCNC: 16.1 G/DL (ref 11.7–15.4)
MCH RBC QN AUTO: 29 PG (ref 26.1–32.9)
MCHC RBC AUTO-ENTMCNC: 33.6 G/DL (ref 31.4–35)
MCV RBC AUTO: 86.2 FL (ref 79.6–97.8)
NRBC # BLD: 0 K/UL (ref 0–0.2)
O2/TOTAL GAS SETTING VFR VENT: 21 %
O2/TOTAL GAS SETTING VFR VENT: 21 %
PCO2 BLD: 54.1 MMHG (ref 35–45)
PCO2 BLDV: 42.4 MMHG (ref 41–51)
PH BLD: 7.29 [PH] (ref 7.35–7.45)
PH BLDV: 7.36 [PH] (ref 7.32–7.42)
PLATELET # BLD AUTO: 113 K/UL (ref 150–450)
PMV BLD AUTO: 12.5 FL (ref 9.4–12.3)
PO2 BLD: 14 MMHG (ref 75–100)
PO2 BLDV: 23 MMHG
RBC # BLD AUTO: 5.56 M/UL (ref 4.05–5.2)
SAO2 % BLD: 15 % (ref 95–98)
SAO2 % BLDV: 39 % (ref 65–88)
SERVICE CMNT-IMP: ABNORMAL
SERVICE CMNT-IMP: ABNORMAL
SPECIMEN EXP DATE BLD: NORMAL
SPECIMEN TYPE: ABNORMAL
SPECIMEN TYPE: ABNORMAL
WBC # BLD AUTO: 7.1 K/UL (ref 4.3–11.1)

## 2021-01-17 PROCEDURE — 75410000002 HC LABOR FEE PER 1 HR: Performed by: OBSTETRICS & GYNECOLOGY

## 2021-01-17 PROCEDURE — 82803 BLOOD GASES ANY COMBINATION: CPT

## 2021-01-17 PROCEDURE — 75410000000 HC DELIVERY VAGINAL/SINGLE: Performed by: OBSTETRICS & GYNECOLOGY

## 2021-01-17 PROCEDURE — 86901 BLOOD TYPING SEROLOGIC RH(D): CPT

## 2021-01-17 PROCEDURE — A4300 CATH IMPL VASC ACCESS PORTAL: HCPCS | Performed by: ANESTHESIOLOGY

## 2021-01-17 PROCEDURE — 77030019905 HC CATH URETH INTMIT MDII -A

## 2021-01-17 PROCEDURE — 74011250636 HC RX REV CODE- 250/636: Performed by: ANESTHESIOLOGY

## 2021-01-17 PROCEDURE — 77030014125 HC TY EPDRL BBMI -B: Performed by: ANESTHESIOLOGY

## 2021-01-17 PROCEDURE — 74011250637 HC RX REV CODE- 250/637: Performed by: OBSTETRICS & GYNECOLOGY

## 2021-01-17 PROCEDURE — 74011250636 HC RX REV CODE- 250/636: Performed by: NURSE ANESTHETIST, CERTIFIED REGISTERED

## 2021-01-17 PROCEDURE — 3E033VJ INTRODUCTION OF OTHER HORMONE INTO PERIPHERAL VEIN, PERCUTANEOUS APPROACH: ICD-10-PCS | Performed by: OBSTETRICS & GYNECOLOGY

## 2021-01-17 PROCEDURE — 74011250636 HC RX REV CODE- 250/636: Performed by: OBSTETRICS & GYNECOLOGY

## 2021-01-17 PROCEDURE — 75410000003 HC RECOV DEL/VAG/CSECN EA 0.5 HR: Performed by: OBSTETRICS & GYNECOLOGY

## 2021-01-17 PROCEDURE — 76060000078 HC EPIDURAL ANESTHESIA: Performed by: OBSTETRICS & GYNECOLOGY

## 2021-01-17 PROCEDURE — 85027 COMPLETE CBC AUTOMATED: CPT

## 2021-01-17 PROCEDURE — 74011000250 HC RX REV CODE- 250: Performed by: ANESTHESIOLOGY

## 2021-01-17 PROCEDURE — 65270000029 HC RM PRIVATE

## 2021-01-17 PROCEDURE — 2709999900 HC NON-CHARGEABLE SUPPLY

## 2021-01-17 RX ORDER — DIPHENHYDRAMINE HCL 25 MG
25-50 CAPSULE ORAL
Status: DISCONTINUED | OUTPATIENT
Start: 2021-01-17 | End: 2021-01-18 | Stop reason: HOSPADM

## 2021-01-17 RX ORDER — HYDROCODONE BITARTRATE AND ACETAMINOPHEN 5; 325 MG/1; MG/1
1-2 TABLET ORAL
Status: DISCONTINUED | OUTPATIENT
Start: 2021-01-17 | End: 2021-01-18 | Stop reason: HOSPADM

## 2021-01-17 RX ORDER — MINERAL OIL
120 OIL (ML) ORAL
Status: COMPLETED | OUTPATIENT
Start: 2021-01-17 | End: 2021-01-17

## 2021-01-17 RX ORDER — BUTORPHANOL TARTRATE 2 MG/ML
1 INJECTION INTRAMUSCULAR; INTRAVENOUS
Status: DISCONTINUED | OUTPATIENT
Start: 2021-01-17 | End: 2021-01-17 | Stop reason: HOSPADM

## 2021-01-17 RX ORDER — ZOLPIDEM TARTRATE 5 MG/1
5 TABLET ORAL
Status: DISCONTINUED | OUTPATIENT
Start: 2021-01-17 | End: 2021-01-18 | Stop reason: HOSPADM

## 2021-01-17 RX ORDER — NALOXONE HYDROCHLORIDE 0.4 MG/ML
0.4 INJECTION, SOLUTION INTRAMUSCULAR; INTRAVENOUS; SUBCUTANEOUS AS NEEDED
Status: DISCONTINUED | OUTPATIENT
Start: 2021-01-17 | End: 2021-01-18 | Stop reason: HOSPADM

## 2021-01-17 RX ORDER — HYDROCORTISONE 25 MG/G
CREAM TOPICAL 4 TIMES DAILY
Status: DISCONTINUED | OUTPATIENT
Start: 2021-01-17 | End: 2021-01-18 | Stop reason: HOSPADM

## 2021-01-17 RX ORDER — SODIUM CHLORIDE 0.9 % (FLUSH) 0.9 %
5-40 SYRINGE (ML) INJECTION AS NEEDED
Status: DISCONTINUED | OUTPATIENT
Start: 2021-01-17 | End: 2021-01-18 | Stop reason: HOSPADM

## 2021-01-17 RX ORDER — FENTANYL CITRATE 50 UG/ML
INJECTION, SOLUTION INTRAMUSCULAR; INTRAVENOUS
Status: COMPLETED
Start: 2021-01-17 | End: 2021-01-17

## 2021-01-17 RX ORDER — OXYTOCIN/RINGER'S LACTATE 30/500 ML
0-20 PLASTIC BAG, INJECTION (ML) INTRAVENOUS
Status: DISCONTINUED | OUTPATIENT
Start: 2021-01-17 | End: 2021-01-17 | Stop reason: HOSPADM

## 2021-01-17 RX ORDER — OXYTOCIN/RINGER'S LACTATE 30/500 ML
10 PLASTIC BAG, INJECTION (ML) INTRAVENOUS AS NEEDED
Status: COMPLETED | OUTPATIENT
Start: 2021-01-17 | End: 2021-01-17

## 2021-01-17 RX ORDER — LIDOCAINE HYDROCHLORIDE AND EPINEPHRINE 15; 5 MG/ML; UG/ML
INJECTION, SOLUTION EPIDURAL
Status: COMPLETED | OUTPATIENT
Start: 2021-01-17 | End: 2021-01-17

## 2021-01-17 RX ORDER — IBUPROFEN 800 MG/1
800 TABLET ORAL
Status: DISCONTINUED | OUTPATIENT
Start: 2021-01-17 | End: 2021-01-18 | Stop reason: HOSPADM

## 2021-01-17 RX ORDER — LIDOCAINE HYDROCHLORIDE 10 MG/ML
1 INJECTION INFILTRATION; PERINEURAL
Status: DISCONTINUED | OUTPATIENT
Start: 2021-01-17 | End: 2021-01-17 | Stop reason: HOSPADM

## 2021-01-17 RX ORDER — DOCUSATE SODIUM 100 MG/1
100 CAPSULE, LIQUID FILLED ORAL 2 TIMES DAILY
Status: DISCONTINUED | OUTPATIENT
Start: 2021-01-17 | End: 2021-01-18 | Stop reason: HOSPADM

## 2021-01-17 RX ORDER — SODIUM CHLORIDE 0.9 % (FLUSH) 0.9 %
5-40 SYRINGE (ML) INJECTION EVERY 8 HOURS
Status: DISCONTINUED | OUTPATIENT
Start: 2021-01-17 | End: 2021-01-18 | Stop reason: HOSPADM

## 2021-01-17 RX ORDER — DIPHENHYDRAMINE HYDROCHLORIDE 50 MG/ML
12.5 INJECTION, SOLUTION INTRAMUSCULAR; INTRAVENOUS ONCE
Status: COMPLETED | OUTPATIENT
Start: 2021-01-17 | End: 2021-01-17

## 2021-01-17 RX ORDER — FENTANYL CITRATE 50 UG/ML
INJECTION, SOLUTION INTRAMUSCULAR; INTRAVENOUS AS NEEDED
Status: DISCONTINUED | OUTPATIENT
Start: 2021-01-17 | End: 2021-01-17 | Stop reason: HOSPADM

## 2021-01-17 RX ORDER — OXYTOCIN/RINGER'S LACTATE 30/500 ML
87.3 PLASTIC BAG, INJECTION (ML) INTRAVENOUS AS NEEDED
Status: DISCONTINUED | OUTPATIENT
Start: 2021-01-17 | End: 2021-01-18 | Stop reason: HOSPADM

## 2021-01-17 RX ORDER — LIDOCAINE HYDROCHLORIDE 20 MG/ML
JELLY TOPICAL
Status: DISCONTINUED | OUTPATIENT
Start: 2021-01-17 | End: 2021-01-17 | Stop reason: HOSPADM

## 2021-01-17 RX ORDER — ROPIVACAINE HYDROCHLORIDE 2 MG/ML
INJECTION, SOLUTION EPIDURAL; INFILTRATION; PERINEURAL
Status: DISCONTINUED | OUTPATIENT
Start: 2021-01-17 | End: 2021-01-17 | Stop reason: HOSPADM

## 2021-01-17 RX ORDER — DEXTROSE, SODIUM CHLORIDE, SODIUM LACTATE, POTASSIUM CHLORIDE, AND CALCIUM CHLORIDE 5; .6; .31; .03; .02 G/100ML; G/100ML; G/100ML; G/100ML; G/100ML
125 INJECTION, SOLUTION INTRAVENOUS CONTINUOUS
Status: DISCONTINUED | OUTPATIENT
Start: 2021-01-17 | End: 2021-01-18 | Stop reason: HOSPADM

## 2021-01-17 RX ORDER — FENTANYL CITRATE 50 UG/ML
100 INJECTION, SOLUTION INTRAMUSCULAR; INTRAVENOUS ONCE
Status: ACTIVE | OUTPATIENT
Start: 2021-01-17 | End: 2021-01-17

## 2021-01-17 RX ORDER — SIMETHICONE 80 MG
80 TABLET,CHEWABLE ORAL
Status: DISCONTINUED | OUTPATIENT
Start: 2021-01-17 | End: 2021-01-18 | Stop reason: HOSPADM

## 2021-01-17 RX ADMIN — Medication 12 MILLI-UNITS/MIN: at 16:09

## 2021-01-17 RX ADMIN — FENTANYL CITRATE 15 MCG: 50 INJECTION INTRAMUSCULAR; INTRAVENOUS at 11:02

## 2021-01-17 RX ADMIN — FENTANYL CITRATE 85 MCG: 50 INJECTION INTRAMUSCULAR; INTRAVENOUS at 11:03

## 2021-01-17 RX ADMIN — SODIUM CHLORIDE, SODIUM LACTATE, POTASSIUM CHLORIDE, CALCIUM CHLORIDE, AND DEXTROSE MONOHYDRATE 125 ML/HR: 600; 310; 30; 20; 5 INJECTION, SOLUTION INTRAVENOUS at 08:12

## 2021-01-17 RX ADMIN — DOCUSATE SODIUM 100 MG: 100 CAPSULE, LIQUID FILLED ORAL at 20:37

## 2021-01-17 RX ADMIN — Medication 250 ML/HR: at 17:34

## 2021-01-17 RX ADMIN — MINERAL OIL 120 ML: 1000 LIQUID ORAL at 17:20

## 2021-01-17 RX ADMIN — SODIUM CHLORIDE, SODIUM LACTATE, POTASSIUM CHLORIDE, CALCIUM CHLORIDE, AND DEXTROSE MONOHYDRATE 125 ML/HR: 600; 310; 30; 20; 5 INJECTION, SOLUTION INTRAVENOUS at 16:05

## 2021-01-17 RX ADMIN — HYDROCODONE BITARTRATE AND ACETAMINOPHEN 1 TABLET: 5; 325 TABLET ORAL at 21:20

## 2021-01-17 RX ADMIN — ROPIVACAINE HYDROCHLORIDE 9 ML/HR: 2 INJECTION, SOLUTION EPIDURAL; INFILTRATION at 11:13

## 2021-01-17 RX ADMIN — LIDOCAINE HYDROCHLORIDE,EPINEPHRINE BITARTRATE 4.5 ML: 15; .005 INJECTION, SOLUTION EPIDURAL; INFILTRATION; INTRACAUDAL; PERINEURAL at 11:02

## 2021-01-17 RX ADMIN — DIPHENHYDRAMINE HYDROCHLORIDE 12.5 MG: 50 INJECTION, SOLUTION INTRAMUSCULAR; INTRAVENOUS at 11:27

## 2021-01-17 RX ADMIN — Medication 2 MILLI-UNITS/MIN: at 08:11

## 2021-01-17 RX ADMIN — HYDROCODONE BITARTRATE AND ACETAMINOPHEN 1 TABLET: 5; 325 TABLET ORAL at 20:37

## 2021-01-17 NOTE — PROGRESS NOTES
Hannah Barrientos at bedside at [phone]. MAHENDRA Miguel at bedside at 1057    Assisted pt to sitting up on bedside at 1056. Timeout completed at [phone] with MD, MAHENDRA and myself at bedside. Test dose given at 1103. Negative reaction. Dose given at 1108. Pt assisted to lying back in left tilt position. See anesthesia record for details. See vital sign flow sheet for BP. Tolerated procedure well.

## 2021-01-17 NOTE — ANESTHESIA PREPROCEDURE EVALUATION
Relevant Problems   No relevant active problems       Anesthetic History   No history of anesthetic complications            Review of Systems / Medical History  Patient summary reviewed and pertinent labs reviewed    Pulmonary  Within defined limits                 Neuro/Psych   Within defined limits           Cardiovascular  Within defined limits                Exercise tolerance: >4 METS     GI/Hepatic/Renal     GERD: well controlled           Endo/Other  Within defined limits           Other Findings              Physical Exam    Airway  Mallampati: II  TM Distance: 4 - 6 cm  Neck ROM: normal range of motion   Mouth opening: Normal     Cardiovascular  Regular rate and rhythm,  S1 and S2 normal,  no murmur, click, rub, or gallop             Dental         Pulmonary  Breath sounds clear to auscultation               Abdominal         Other Findings            Anesthetic Plan    ASA: 2  Anesthesia type: CSE      Post-op pain plan if not by surgeon: indwelling epidural catheter, epidural opioid and intrathecal opiates      Anesthetic plan and risks discussed with: Patient and Spouse

## 2021-01-17 NOTE — PROGRESS NOTES
Pt c/o itching. Anesthesiologist called and orders received for 12.5mg of benadryl. See MAR for details.

## 2021-01-17 NOTE — H&P
History & Physical    Name: Maribell Prasad MRN: [id number]  SSN: [id number]    YOB: 1985  Age: 37 y.o. Sex: female      Subjective:     Estimated Date of Delivery: 21  OB History    Para Term  AB Living   3 1 1   1 1   SAB TAB Ectopic Molar Multiple Live Births   1       0 1      # Outcome Date GA Lbr Fan/2nd Weight Sex Delivery Anes PTL Lv   3 Current            2 SAB 2019           1 Term 10/16/17 39w6d 10::21 7 lb 1.1 oz (3.205 kg) Randall Ibrahim is admitted with pregnancy at 39w1d for induction of labor due to favorable cervix at term. Prenatal course was normal.  Please see prenatal records for details. Problem List  Date Reviewed: 2020          Codes Class Noted    * (Principal) Encounter for supervision of low-risk pregnancy, antepartum ICD-10-CM: Z34.90  ICD-9-CM: V22.1  10/12/2020    Overview Addendum 10/12/2020 10:48 PM by Julieth Cochran MD     Late OB Transfer from [facility]  (delivered here last pregnancy as well)    Records from [facility] reviewed. [facility] 2021 based on early US, not c/w LMP    1st trimester screening US wnl in [facility] as well as [facility]     Was started on ASA 81mg and LMWH per RCOG guidelines in [facility] due to having had 1 prior MAB. Pt states hasn't taken since 22wks. Do not recommend she continue the LMWH. May continue the ASA. Growth US here 10/12/2020 at 25w2d:  GP 47%, AC 28%, CARIDAD 18     Flu vaccine (10/12/2020)    OB hx:  G1:   10/16/17 w/ Dr Saint Mechanic at [address] [place] [address]. Baby 7#1. 1oz   G2: MAB at 9wks --suction D&C 10/30/2019                 GERD (gastroesophageal reflux disease) ICD-10-CM: K21.9  ICD-9-CM: 530.81  10/12/2020        Other hemorrhoids ICD-10-CM: K64.8  ICD-9-CM: 455.6  10/12/2020                Past Medical History:   Diagnosis Date    Anemia     Breast disorder     right breast lump-benign     Past Surgical History:   Procedure Laterality Date    HX DILATION AND CURETTAGE for MAB around 9wks     HX REFRACTIVE SURGERY      HX WISDOM TEETH EXTRACTION       Social History     Occupational History    Not on file   Tobacco Use    Smoking status: Former Smoker     Packs/day: 0.25     Years: 2.00     Pack years: 0.50     Quit date: 2/10/2017     Years since quitting: 3.9    Smokeless tobacco: Never Used   Substance and Sexual Activity    Alcohol use: No    Drug use: No    Sexual activity: Yes     Partners: Male     Birth control/protection: None     Family History   Problem Relation Age of Onset    Hypertension Father     Kidney Disease Father     Diabetes Neg Hx        No Known Allergies  Prior to Admission medications    Medication Sig Start Date End Date Taking? Authorizing Provider   hydrocortisone (ANUSOL-HC) 2.5 % rectal cream Insert  into rectum four (4) times daily. 12/16/20  Yes Jacob Calvillo MD   omeprazole (PRILOSEC) 40 mg capsule Take 1 Cap by mouth daily. 10/12/20  Yes Candelario Lorenzo MD   PNV#16-Iron Fum & PS-FA-OM-3 35-1-200 mg cap Take  by mouth. Yes Provider, Historical        Review of Systems: Pertinent items are noted in the History of Present Illness. Objective:     Vitals:  Vitals:    01/17/21 1105 01/17/21 1107 01/17/21 1108 01/17/21 1110   BP: 128/81 107/66 120/70 120/68   Pulse: (!) 112 (!) 102 (!) 133 (!) 102   Temp:       Weight:       Height:            Physical Exam:  Patient without distress. Heart: Regular rate and rhythm  Lung: clear to auscultation throughout lung fields, no wheezes, no rales, no rhonchi and normal respiratory effort  Abdomen: soft, nontender  Fundus: soft and non tender  Cervical Exam: 3/50/-2  Membranes:  Artificial Rupture of Membranes;  Amniotic Fluid: copius amount of clear fluid  Fetal Heart Rate: Reactive          Prenatal Labs:   Lab Results   Component Value Date/Time    ABO/Rh(D) B POSITIVE 01/17/2021 07:51 AM    Rubella, External immune  08/28/2017    HBsAg, External neg 08/28/2017    HIV, External neg 2017    RPR, External NR  2017    ABO,Rh B positive  2017    GrBStrep, External positive  2017       Impression/Plan:     Principal Problem:    Encounter for supervision of low-risk pregnancy, antepartum (10/12/2020)      Overview: Late OB Transfer from [facility] at 25w2d      (delivered here last pregnancy as well)            Records from [facility] reviewed. [facility] 2021 based on early US, not c/w LMP            1st trimester screening US wnl in [facility] as well as [facility]             Was started on ASA 81mg and LMWH per RCOG guidelines in [facility] due to       having had 1 prior MAB. Pt states hasn't taken since 22wks. Do not       recommend she continue the LMWH. May continue the ASA. Growth US here 10/12/2020 at 25w2d:  GP 47%, AC 28%, CARIDAD 18             Flu vaccine (10/12/2020)            OB hx:      G1:   10/16/17 w/ Dr Vernell Waldron at [address] [place] [address]. Baby 7#1. 1oz       G2: MAB at 9wks --suction D&C 10/30/2019                     Plan: Admit for induction of labor. Group B Strep negative.     Mikey Solorzano MD  12:07 PM

## 2021-01-17 NOTE — L&D DELIVERY NOTE
Delivery Summary    Patient: Ck Adrian MRN: [id number]  SSN: [id number]    YOB: 1985  Age: 37 y.o. Sex: female       Information for the patient's :  Alison Powell [[id number]]       Labor Events:    Labor: No    Steroids: None   Cervical Ripening Date/Time:       Cervical Ripening Type: None   Antibiotics During Labor: No   Rupture Identifier:      Rupture Date/Time: 2021 12:00 PM   Rupture Type: AROM   Amniotic Fluid Volume: Polyhydramic    Amniotic Fluid Description: Clear    Amniotic Fluid Odor: None    Induction: Oxytocin       Induction Date/Time: 2021 8:11 AM    Indications for Induction: Elective    Augmentation:     Augmentation Date/Time:      Indications for Augmentation:     Labor complications: None       Additional complications:        Delivery Events:  Indications For Episiotomy:     Episiotomy:     Perineal Laceration(s):     Repaired:     Periurethral Laceration Location:      Repaired:     Labial Laceration Location:     Repaired:     Sulcal Laceration Location:     Repaired:     Vaginal Laceration Location:     Repaired:     Cervical Laceration Location:     Repaired:     Repair Suture:     Number of Repair Packets:     Estimated Blood Loss (ml):  ml   Quantitative Blood Loss (ml)                Delivery Date: 2021    Delivery Time: 5:23 PM  Delivery Type: Vaginal, Spontaneous  Sex:  Male    Gestational Age: 39w1d   Delivery Clinician:  Hugo Nice  Living Status: Living   Delivery Location: L&D 432          APGARS  One minute Five minutes Ten minutes   Skin color: 1   1        Heart rate: 2   2        Grimace: 2   2        Muscle tone: 2   2        Breathin   2        Totals: 9   9            Presentation: Vertex    Position:   Occiput Anterior  Resuscitation Method:  Suctioning-bulb; Tactile Stimulation     Meconium Stained: None      Cord Information: 3 Vessels  Complications: None  Cord around:    Delayed cord clamping? Yes  Cord clamped date/time:   Disposition of Cord Blood: Lab    Blood Gases Sent?: Yes    Placenta:  Date/Time: 2021  5:41 PM  Removal: Spontaneous      Appearance: Normal;Intact     [place] Measurements:  Birth Weight: 8 lb 7.1 oz (3.83 kg)      Birth Length: 53.5 cm      Head Circumference: 37 cm      Chest Circumference: 35 cm     Abdominal Girth: Other Providers:   Mely TAPIA;DRE KAUFMAN;Chepe BEAVERS, Obstetrician;Primary Nurse;Primary [place] Nurse;Nicu Nurse;Neonatologist;Scrub Tech           Group B Strep:   Lab Results   Component Value Date/Time    GrBStrep, External positive  2017     Information for the patient's :  Michael Kim [[id number]]   No results found for: Leeann Mcgee, PCTDIG, BILI, ABORHEXT, [address]     Recent Labs     21   HCO3I  --  26.1*   SO2I  --  15*   IBD  --  2   SPECTI VENOUS BLOOD ARTERIAL   ISITE CORD CORD   IDEV ROOM AIR ROOM AIR   IALLEN NOT APPLICABLE NOT APPLICABLE      Arterial pH: 7.29  Venous pH: 7.36    Delivery Note          Procedure:  Patient became completely dilated and pushed. Episiotomy was not performed. Patient delivered head. Mouth and nares suctioned on the perineum with bulb syringe. Shoulders delivered without any evidence of dystocia. Baby delivered in the bed, was dried. The cord was clamped and cut. Cord pH and cord blood was obtained. The baby was placed on mom in a dry blanket or towel. The perineum was examined and was intact. The placenta was delivered spontaneously. It was examined. It was intact with three vessels. Mom and baby are doing well.          Fina Santiago MD  2021  5:59 PM

## 2021-01-17 NOTE — PROGRESS NOTES
1300 Bedside and Verbal shift change report given to DENTON Means RN (oncoming nurse) by JOSEPHINE Gasca RN (offgoing nurse). Report included the following information SBAR. Sterile straight cath per RN. 700 clear yellow urine drained. SVE per RN. 4/50/-2. Pt. Repositioned to peanut ball on the left. Dr. Jimmy Dacosta AROM at 1200. Unwitnessed. [address] per RN. 5/80/-2. Pt. Repositioned to peanut ball on the right. 1541 Sterile straight cath per RN. 400 cc clear yellow urine drained. SVE per RN 6/80/-2. Pt. Repositioned to side lying release on the left. 1610 Bedside and Verbal shift change report given to IOCS (oncoming nurse) by Nataliia Means RN (offgoing nurse). Report included the following information SBAR.

## 2021-01-17 NOTE — PROGRESS NOTES
MD updated on pt status. Orders received that pt may get her epidural when she starts feeling uncomfortable.

## 2021-01-17 NOTE — ANESTHESIA PROCEDURE NOTES
CSE Block    Start time: 1/17/2021 10:59 AM  End time: 1/17/2021 11:03 AM  Performed by: Jackie Rubalcava MD  Authorized by: Jackie Rubalcava MD     Pre-Procedure  Indications: at surgeon's request and primary anesthetic    preanesthetic checklist: patient identified, risks and benefits discussed, anesthesia consent, site marked, patient being monitored and timeout performed    Timeout Time: 10:58        Procedure:   Patient Position:  Seated  Prep Region:  Lumbar  Prep: chlorhexidine    Location:  L2-3    Epidural Needle:   Needle Type:  Tuohy  Needle Gauge:  18 G  Injection Technique:  Loss of resistance using saline  Attempts:  1    Spinal Needle:   Needle Type:  Quincke  Needle Gauge:  25 G    Catheter:   Catheter Type:  Open end  Catheter Size:  20 G  Catheter at Skin Depth (cm):  5  Depth in Epidural Space (cm):  11  Events: no blood with aspiration, no cerebrospinal fluid with aspiration, no paresthesia and negative aspiration test    Test Dose:  Lidocaine 1.5% w/ epi and negative    Assessment:   Catheter Secured:  Tegaderm and tape  Insertion:  Uncomplicated  Patient tolerance:  Patient tolerated the procedure well with no immediate complications

## 2021-01-18 VITALS
BODY MASS INDEX: 34.15 KG/M2 | RESPIRATION RATE: 18 BRPM | DIASTOLIC BLOOD PRESSURE: 67 MMHG | SYSTOLIC BLOOD PRESSURE: 105 MMHG | TEMPERATURE: 97.6 F | HEIGHT: 64 IN | HEART RATE: 91 BPM | OXYGEN SATURATION: 94 % | WEIGHT: 200 LBS

## 2021-01-18 LAB — PLATELET # BLD AUTO: 144 K/UL (ref 150–450)

## 2021-01-18 PROCEDURE — 85049 AUTOMATED PLATELET COUNT: CPT

## 2021-01-18 PROCEDURE — 2709999900 HC NON-CHARGEABLE SUPPLY

## 2021-01-18 PROCEDURE — 74011250637 HC RX REV CODE- 250/637: Performed by: OBSTETRICS & GYNECOLOGY

## 2021-01-18 PROCEDURE — 36415 COLL VENOUS BLD VENIPUNCTURE: CPT

## 2021-01-18 RX ORDER — IBUPROFEN 800 MG/1
800 TABLET ORAL
Qty: 60 TAB | Refills: 0 | Status: SHIPPED | OUTPATIENT
Start: 2021-01-18 | End: 2021-03-22

## 2021-01-18 RX ORDER — HYDROCODONE BITARTRATE AND ACETAMINOPHEN 5; 325 MG/1; MG/1
1 TABLET ORAL
Qty: 12 TAB | Refills: 0 | Status: SHIPPED | OUTPATIENT
Start: 2021-01-18 | End: 2021-01-21

## 2021-01-18 RX ADMIN — HYDROCODONE BITARTRATE AND ACETAMINOPHEN 1 TABLET: 5; 325 TABLET ORAL at 15:26

## 2021-01-18 RX ADMIN — HYDROCODONE BITARTRATE AND ACETAMINOPHEN 2 TABLET: 5; 325 TABLET ORAL at 02:42

## 2021-01-18 RX ADMIN — IBUPROFEN 800 MG: 800 TABLET ORAL at 10:52

## 2021-01-18 RX ADMIN — HYDROCODONE BITARTRATE AND ACETAMINOPHEN 2 TABLET: 5; 325 TABLET ORAL at 06:22

## 2021-01-18 RX ADMIN — IBUPROFEN 800 MG: 800 TABLET ORAL at 04:12

## 2021-01-18 NOTE — DISCHARGE SUMMARY
Obstetrical Discharge Summary     Name: Warren Phoenix MRN: [id number]  SSN: [id number]    YOB: 1985  Age: 37 y.o. Sex: female      Allergies: Patient has no known allergies. Admit Date: 2021    Discharge Date: 2021     Admitting Physician: Jason Pugh MD     Attending Physician:  Tiffanie Delgado MD     * Admission Diagnoses: 39 weeks gestation of pregnancy [Z3A.39]; Encounter for planned induction of labor [Z34.90]    * Discharge Diagnoses:   Information for the patient's :  Thelma Jimenez [[id number]]   Delivery of a 8 lb 7.1 oz (3.83 kg) male infant via Vaginal, Spontaneous on 2021 at 5:23 PM  by Jason Pugh. Apgars were 9  and 9 . Additional Diagnoses:   Hospital Problems as of 2021 Date Reviewed: 2020          Codes Class Noted - Resolved POA    * (Principal) Encounter for supervision of low-risk pregnancy, antepartum ICD-10-CM: Z34.90  ICD-9-CM: V22.1  10/12/2020 - Present Yes    Overview Addendum 10/12/2020 10:48 PM by Evelio Wagner MD     Late OB Transfer from [facility]  (delivered here last pregnancy as well)    Records from [facility] reviewed. [address] 2021 based on early US, not c/w LMP    1st trimester screening US wnl in [facility] as well as [facility]     Was started on ASA 81mg and LMWH per RCOG guidelines in [facility] due to having had 1 prior MAB. Pt states hasn't taken since 22wks. Do not recommend she continue the LMWH. May continue the ASA. Growth US here 10/12/2020 at 25w2d:  GP 47%, AC 28%, CARIDAD 18     Flu vaccine (10/12/2020)    OB hx:  G1:   10/16/17 w/ Dr Gay Lee at 37w11d. Baby 7#1. 1oz   G2: MAB at 9wks --suction D&C 10/30/2019                      Lab Results   Component Value Date/Time    ABO/Rh(D) B POSITIVE 2021 07:51 AM    Rubella, External immune  2017    GrBStrep, External positive  2017    ABO,Rh B positive  2017      Immunization History   Administered Date(s) Administered   Zakiya Alaniz Influenza Vaccine (Quad) Mdck Pf (>4 Yrs Flucelvax QUAD 42834) 09/21/2017    Influenza Vaccine ([address]) PF (>6 Mo Flulaval, Fluarix, and >3 Yrs [place] [address]) 10/12/2020       * Procedures: vag delivery             * Discharge Condition: good    * Hospital Course: Postpartum course was complicated by thrombocytopenia, which added 0 days to the patient's length of stay. * Disposition: Home    Discharge Medications:   Current Discharge Medication List      START taking these medications    Details   HYDROcodone-acetaminophen (NORCO) 5-325 mg per tablet Take 1 Tab by mouth every four (4) hours as needed for Pain for up to 3 days. Max Daily Amount: 6 Tabs. Qty: 12 Tab, Refills: 0    Associated Diagnoses: Postpartum care following vaginal delivery      ibuprofen (MOTRIN) 800 mg tablet Take 1 Tab by mouth every eight (8) hours as needed for Pain. Qty: 60 Tab, Refills: 0         CONTINUE these medications which have NOT CHANGED    Details   hydrocortisone (ANUSOL-HC) 2.5 % rectal cream Insert  into rectum four (4) times daily. Qty: 30 g, Refills: 3      omeprazole (PRILOSEC) 40 mg capsule Take 1 Cap by mouth daily. Qty: 30 Cap, Refills: 5      PNV#16-Iron Fum & PS-FA-OM-3 35-1-200 mg cap Take  by mouth. * Follow-up Care/Patient Instructions:   Activity: No sex for 6 weeks and No driving while on analgesics  Diet: Regular Diet  Wound Care: As directed    Follow-up Information     Follow up With Specialties Details Why Contact Info    None    None ([phone]) Patient stated that they have no PCP

## 2021-01-18 NOTE — LACTATION NOTE
First visit with breastfeeding mom. Discussed feeding baby on cue. Opening mouth, turning head from side to side, bringing hands to mouth and sucking movements are all early hunger cues. Crying is a late hunger cue. Do lots of skin to skin to help recognize early feeding cues. If baby does not nurse, continue skin to skin and offer again later. On average newborns eat at least 8 or more times per day without restriction. Cluster feeding is normal.  Reviewed positioning techniques and signs of a good latch. Discussed holding baby so that ear, shoulder and hip are in a straight line. Baby is held close and nose lines up with mom's nipple. Discussed supporting baby's neck and mom's breast.  When baby opens wide bring baby quickly onto the breast.  Try for an assymetrical latch with nipple pointed towards the roof of baby's mouth. Mouth should be wide and lips flanged around the breast.  Encouraged to nurse on the first breast until baby finishes that side. If baby comes off the breast quickly, you can re-offer that same side to ensure good stimulation before moving baby to next side. Offer the second breast, baby can take the second breast as long as desired. It is ok if they do not take the second side when offered. Listen and look for swallows. Once milk is in over the next few days, swallowing will become more frequent and obvious. If baby pausing on the breast longer than 10 seconds, remind baby to nurse. Attempt to burp between breasts and after feeding. Rotate which breast the baby starts on. Discussed expected output based on age. Discussed feed on demand. If necessary rouse for feedings at least until above birth weight. Reviewed Breastfeeding Packet and encouraged mom to write down feedings and output at least until first Ped visit.   In accordance with the 4261 AAP Policy Statement on Breastfeeding, Mothers of healthy term  infants should be delay pacifier use until breastfeeding is well-established, usually about 3 to 4 wk after birth. Pacifiers are not available on the Mother Infant Unit. Artificial nipples should also be avoided until breastfeeding is well established. Experienced breastfeeder. Requesting early discharge. Reports baby latching well, has good strong suck. Stays on the breast well. Latching well at all feeds. Good output. Did not observe latch. Sore, but using lanolin. Reviewed position changes, massage breast with feeds. Baby putting a lot of pressure on nipple during colostrum phase, should improve with milk flow and increase supply. Lanolin or olive/coconut oil post feeding. Mom confident.  Questions answered

## 2021-01-18 NOTE — PROGRESS NOTES
Chart reviewed - no needs identified.    SW met with patient/ while social distancing w/appropriate PPE.      Patient denies any history of postpartum depression, but she states that she experienced some anxiety after her first child; however, she was \" never officially diagnosed with it.\"  Patient was in [place] after the birth of her first child.  She did not seek medication and reports that anxiety eventually resolved.  Patient reports appropriate moods during this pregnancy.    Patient given informational packet on  mood & anxiety disorders (resources/education).    Family denies any additional needs from  at this time.  Family has 's contact information should any needs/questions arise.    Ariela Pandya, ZENIA-KELLY  [facility]   [phone]

## 2021-01-18 NOTE — PROGRESS NOTES
SBAR IN Report: Mother    Verbal report received from Winsome Morales RN on this patient, who is now being transferred from [facility] (unit) for routine progression of care. The patient is not wearing a green \"Anesthesia-Duramorph\" band. Report consisted of patient's Situation, Background, Assessment and Recommendations (SBAR). [place] ID bands were compared with the identification form, and verified with the patient and transferring nurse. Information from the Procedure Summary and the [place] Report was reviewed with the transferring nurse; opportunity for questions and clarification provided.

## 2021-01-18 NOTE — PROGRESS NOTES
Safety Teaching reviewed:   1. Hand hygiene prior to handling the infant. 2. Use of bulb syringe  3. Bracelets with matching numbers are placed on mother and infant  3. An infant security tag  [facility]) is placed on the infant's ankle and monitored  5. All OB nurses wear pink Employee badges - do not give your baby to anyone without proper identification. 6. Never leave the baby alone in the room. 7. The infant should be placed on their back to sleep. on a firm mattress. No toys should be placed in the crib. (safe sleep video offered to view)  8. Never shake the baby (video offered to view)  9. Infant fall prevention - do not sleep with the baby, and place the baby in the crib while ambulating. 8. Mother and Baby Care booklet given to Mother.

## 2021-01-18 NOTE — PROGRESS NOTES
Patient up to bathroom with minimal assistance. Natalya-care taught and completed. Questions encouraged and answered. Patient back to bed, encouraged to call for needs or concerns. Verbalizes understanding.

## 2021-01-18 NOTE — ANESTHESIA POSTPROCEDURE EVALUATION
* No procedures listed *. CSE    Anesthesia Post Evaluation      Multimodal analgesia: multimodal analgesia not used between 6 hours prior to anesthesia start to PACU discharge  Patient location during evaluation: PACU  Patient participation: complete - patient participated  Level of consciousness: awake and alert  Pain score: 0  Pain management: adequate  Airway patency: patent  Anesthetic complications: no  Cardiovascular status: acceptable and hemodynamically stable  Respiratory status: acceptable and spontaneous ventilation  Hydration status: acceptable  Post anesthesia nausea and vomiting:  none  Final Post Anesthesia Temperature Assessment:  Normothermia (36.0-37.5 degrees C)      INITIAL Post-op Vital signs: No vitals data found for the desired time range.

## 2021-01-18 NOTE — DISCHARGE INSTRUCTIONS
Patient Education   Patient Education        Vaginal Childbirth: Care Instructions  Overview     Vaginal birth means delivering a baby through the birth canal (vagina). During labor, the uterus tightens (contracts) regularly to thin and open the cervix and to push the baby out through the birth canal.  Your body will slowly heal in the next few weeks. It's easy to get too tired and overwhelmed during the first weeks after your baby is born. Changes in your hormones can shift your mood without warning. You may find it hard to meet the extra demands on your energy and time. Take it easy on yourself. Follow-up care is a key part of your treatment and safety. Be sure to make and go to all appointments, and call your doctor if you are having problems. It's also a good idea to know your test results and keep a list of the medicines you take. How can you care for yourself at home? Vaginal bleeding and cramps  · After delivery, you will have a bloody discharge from your vagina. This will turn pink within a week and then white or yellow after about 10 days. It may last for 2 to 4 weeks or longer, until the uterus has healed. Use pads instead of tampons until you stop bleeding. · Don't worry if you pass some blood clots, as long as they are smaller than a golf ball. If you have a tear or stitches in your vaginal area, change the pad at least every 4 hours. This will help prevent soreness and infection. · You may have cramps for the first few days after childbirth. These are normal and occur as the uterus shrinks to normal size. Take an over-the-counter pain medicine, such as acetaminophen (Tylenol), ibuprofen (Advil, Motrin), or naproxen (Aleve), for cramps. Read and follow all instructions on the label. Do not take aspirin, because it can cause more bleeding. · Do not take two or more pain medicines at the same time unless the doctor told you to. Many pain medicines have acetaminophen, which is Tylenol.  Too much acetaminophen (Tylenol) can be harmful. Stitches  · If you have stitches, they will dissolve on their own and don't need to be removed. Follow your doctor's instructions for cleaning the stitched area. · Put ice or a cold pack on your painful area for 10 to 20 minutes at a time, several times a day, for the first few days. Put a thin cloth between the ice and your skin. · Sit in a few inches of warm water (sitz bath) 3 times a day and after bowel movements. The warm water helps with pain and itching. If you don't have a tub, a warm shower might help. Breast fullness  · Your breasts may overfill (engorge) in the first few days after delivery. To help milk flow and to relieve pain, warm your breasts in the shower or by using warm, moist towels before nursing. · If you aren't nursing, don't put warmth on your breasts or touch your breasts. Wear a tight bra or sports bra and use ice until the fullness goes away. This usually takes 2 to 3 days. · Put ice or a cold pack on your breast after nursing to reduce swelling and pain. Put a thin cloth between the ice and your skin. Activity  · Eat a balanced diet. Don't try to lose weight by cutting calories. Keep taking your prenatal vitamins, or take a multivitamin. · Get as much rest as you can. Try to take naps when your baby sleeps during the day. · Get some exercise every day. But don't do any heavy exercise until your doctor says it is okay. · Wait until you are healed (about 4 to 6 weeks) before you have sexual intercourse. Your doctor will tell you when it is okay to have sex. · Talk to your doctor about birth control. You can get pregnant even before your period returns. Also, you can get pregnant while you are breastfeeding. Mental health  · It's normal to have some sadness, anxiety, sleeplessness, and mood swings after you go home.  If you feel upset or hopeless for more than a few days or are having trouble doing the things you need to do, talk to your doctor. Constipation and hemorrhoids  · Drink plenty of fluids, enough so that your urine is light yellow or clear like water. If you have kidney, heart, or liver disease and have to limit fluids, talk with your doctor before you increase the amount of fluids you drink. · Eat plenty of fiber each day. Have a bran muffin or bran cereal for breakfast. Try eating a piece of fruit for a mid-afternoon snack. · For painful, itchy hemorrhoids, put ice or a cold pack on the area several times a day for 10 minutes at a time. Follow this by putting a warm compress on the area for another 10 to 20 minutes or by sitting in a shallow, warm bath. When should you call for help? Call  911 anytime you think you may need emergency care. For example, call if:    · You have thoughts of harming yourself, your baby, or another person.     · You passed out (lost consciousness).     · You have chest pain, are short of breath, or cough up blood.     · You have a seizure. Call your doctor now or seek immediate medical care if:    · You have severe vaginal bleeding.     · You are dizzy or lightheaded, or you feel like you may faint.     · You have a fever.     · You have new or more pain in your belly or pelvis.     · You have symptoms of a blood clot in your leg (called a deep vein thrombosis), such as:  ? Pain in the calf, back of the knee, thigh, or groin. ? Redness and swelling in your leg or groin.     · You have signs of preeclampsia, such as:  ? Sudden swelling of your face, hands, or feet. ? New vision problems (such as dimness, blurring, or seeing spots). ? A severe headache. Watch closely for changes in your health, and be sure to contact your doctor if:    · Your vaginal bleeding seems to be getting heavier.     · You have new or worse vaginal discharge.     · You feel sad, anxious, or hopeless for more than a few days.     · You do not get better as expected. Where can you learn more?   Go to http://www.gray.com/  Enter J874 in the search box to learn more about \"Vaginal Childbirth: Care Instructions. \"  Current as of: 2020               Content Version: 12.6  © 8982-0382 [employer]. Care instructions adapted under license by [employer] (which disclaims liability or warranty for this information). If you have questions about a medical condition or this instruction, always ask your healthcare professional. [address] any warranty or liability for your use of this information. After Your Delivery (the Postpartum Period): Care Instructions  Your Care Instructions     Congratulations on the birth of your baby. Like pregnancy, the  period can be a time of excitement, barb, and exhaustion. You may look at your wondrous little baby and feel happy. You may also be overwhelmed by your new sleep hours and new responsibilities. At first, babies often sleep during the days and are awake at night. They do not have a pattern or routine. They may make sudden gasps, jerk themselves awake, or look like they have crossed eyes. These are all normal, and they may even make you smile. In these first weeks after delivery, try to take good care of yourself. It may take 4 to 6 weeks to feel like yourself again, and possibly longer if you had a  birth. You will likely feel very tired for several weeks. Your days will be full of ups and downs, but lots of barb as well. Follow-up care is a key part of your treatment and safety. Be sure to make and go to all appointments, and call your doctor if you are having problems. It's also a good idea to know your test results and keep a list of the medicines you take. How can you care for yourself at home? Take care of your body after delivery  · Use pads instead of tampons for the bloody flow that may last as long as 2 weeks.   · Ease cramps with ibuprofen (Advil, Motrin). · Ease soreness of hemorrhoids and the area between your vagina and rectum with ice compresses or witch hazel pads. · Ease constipation by drinking lots of fluid and eating high-fiber foods. Ask your doctor about over-the-counter stool softeners. · Cleanse yourself with a gentle squeeze of warm water from a bottle instead of wiping with toilet paper. · Take a sitz bath in warm water several times a day. · Wear a good nursing bra. Ease sore and swollen breasts with warm, wet washcloths. · If you are not breastfeeding, use ice rather than heat for breast soreness. · Your period may not start for several months if you are breastfeeding. You may bleed more, and longer at first, than you did before you got pregnant. · Wait until you are healed (about 4 to 6 weeks) before you have sexual intercourse. Your doctor will tell you when it is okay to have sex. · Try not to travel with your baby for 5 or 6 weeks. If you take a long car trip, make frequent stops to walk around and stretch. Avoid exhaustion  · Rest every day. Try to nap when your baby naps. · Ask another adult to be with you for a few days after delivery. · Plan for  if you have other children. · Stay flexible so you can eat at odd hours and sleep when you need to. Both you and your baby are making new schedules. · Plan small trips to get out of the house. Change can make you feel less tired. · Ask for help with housework, cooking, and shopping. Remind yourself that your job is to care for your baby. Know about help for postpartum depression  · \"Baby blues\" are common for the first 1 to 2 weeks after birth. You may cry or feel sad or irritable for no reason. · Rest whenever you can. Being tired makes it harder to handle your emotions. · Go for walks with your baby. · Talk to your partner, friends, and family about your feelings.   · If your symptoms last for more than a few weeks, or if you feel very depressed, ask your doctor for help. · Postpartum depression can be treated. Support groups and counseling can help. Sometimes medicine can also help. Stay healthy  · Eat healthy foods so you have more energy and lose extra baby pounds. · If you breastfeed, avoid drugs. If you quit smoking during pregnancy, try to stay smoke-free. If you choose to have a drink now and then, have only one drink, and limit the number of occasions that you have a drink. Wait to breastfeed at least 2 hours after you have a drink to reduce the amount of alcohol the baby may get in the milk. · Start daily exercise after 4 to 6 weeks, but rest when you feel tired. · Learn exercises to tone your belly. Do Kegel exercises to regain strength in your pelvic muscles. You can do these exercises while you stand or sit. ? Squeeze the same muscles you would use to stop your urine. Your belly and thighs should not move. ? Hold the squeeze for 3 seconds, and then relax for 3 seconds. ? Start with 3 seconds. Then add 1 second each week until you are able to squeeze for 10 seconds. ? Repeat the exercise 10 to 15 times for each session. Do three or more sessions each day. · Find a class for new mothers and new babies that has an exercise time. · If you had a  birth, give yourself a bit more time before you exercise, and be careful. When should you call for help? Call  911 anytime you think you may need emergency care. For example, call if:    · You have thoughts of harming yourself, your baby, or another person.     · You passed out (lost consciousness).     · You have chest pain, are short of breath, or cough up blood.     · You have a seizure. Call your doctor now or seek immediate medical care if:    · You have severe vaginal bleeding.  This means you are passing blood clots and soaking through a pad each hour for 2 or more hours.     · You are dizzy or lightheaded, or you feel like you may faint.     · You have a fever.     · You have new or more belly pain.     · You have signs of a blood clot in your leg (called a deep vein thrombosis), such as:  ? Pain in the calf, back of the knee, thigh, or groin. ? Redness and swelling in your leg or groin.     · You have signs of preeclampsia, such as:  ? Sudden swelling of your face, hands, or feet. ? New vision problems (such as dimness, blurring, or seeing spots). ? A severe headache. Watch closely for changes in your health, and be sure to contact your doctor if:    · Your vaginal bleeding seems to be getting heavier.     · You have new or worse vaginal discharge.     · You feel sad, anxious, or hopeless for more than a few days.     · You do not get better as expected. Where can you learn more? Go to http://www.greenwood.com/  Enter A461 in the search box to learn more about \"After Your Delivery (the Postpartum Period): Care Instructions. \"  Current as of: February 11, 2020               Content Version: 12.6  © 2006-2020 [employer]. Care instructions adapted under license by [employer] (which disclaims liability or warranty for this information). If you have questions about a medical condition or this instruction, always ask your healthcare professional. [address] any warranty or liability for your use of this information.

## 2021-01-18 NOTE — PROGRESS NOTES
SBAR OUT Report: Mother    Verbal report given to SHANIQUA Magallanes (full name & credentials) on this patient, who is now being transferred to MIU (unit) for routine progression of care. The patient is not wearing a green \"Anesthesia-Duramorph\" band. Report consisted of patient's Situation, Background, Assessment and Recommendations (SBAR).  ID bands were compared with the identification form, and verified with the patient and receiving nurse. Information from the SBAR and the [address] [facility] Report was reviewed with the receiving nurse; opportunity for questions and clarification provided.

## 2021-01-18 NOTE — PROGRESS NOTES
Post-Partum Day Number 1 Progress Note  Adrienne Sun  [id number]    Patient doing well post-partum without significant complaint. Voiding without difficulty, normal lochia. Wants to go home today. Says she has to take motrin and 2 narcotic pills to manage the cramping. Vitals:    Patient Vitals for the past 8 hrs:   BP Temp Pulse Resp SpO2   21 0716 105/67 97.6 °F (36.4 °C) 91 18 94 %     Temp (24hrs), Av °F (36.7 °C), Min:97.6 °F (36.4 °C), Max:98.2 °F (36.8 °C)      Vital signs stable, afebrile. Exam:  Patient without distress. Abdomen soft, fundus firm at level of umbilicus, nontender               Labs:   Recent Results (from the past 24 hour(s))   POC G3    Collection Time: 21  5:57 PM   Result Value Ref Range    Device: ROOM AIR      FIO2 (POC) 21 %    pH (POC) 7.29 (L) 7.35 - 7.45      pCO2 (POC) 54.1 (H) 35 - 45 MMHG    pO2 (POC) 14 (LL) 75 - 100 MMHG    HCO3 (POC) 26.1 (H) 22 - 26 MMOL/L    sO2 (POC) 15 (L) 95 - 98 %    Base deficit (POC) 2 mmol/L    Allens test (POC) NOT APPLICABLE      Site CORD      Specimen type (POC) ARTERIAL      Performed by Heaven)RTBS     CO2, POC 28 MMOL/L    COLLECT TIME 1,723     POC VENOUS BLOOD GAS    Collection Time: 21  5:59 PM   Result Value Ref Range    Device: ROOM AIR      FIO2 (POC) 21 %    pH, venous (POC) 7.36 7.32 - 7.42      pCO2, venous (POC) 42.4 41 - 51 MMHG    pO2, venous (POC) 23 (LL) mmHg    HCO3, venous (POC) 24.2 23 - 28 MMOL/L    sO2, venous (POC) 39 (L) 65 - 88 %    Base deficit, venous (POC) 1 mmol/L    Allens test (POC) NOT APPLICABLE      Site CORD      Specimen type (POC) VENOUS BLOOD      Performed by Susan(Rojelio)RTBS     CO2, POC 25 MMOL/L    COLLECT TIME 1,723         Assessment and Plan:  Patient appears to be having uncomplicated post-partum course. Continue routine perineal care and maternal education.

## 2021-01-21 ENCOUNTER — HOSPITAL ENCOUNTER (OUTPATIENT)
Dept: LACTATION | Age: 36
Discharge: HOME | End: 2021-01-21

## 2021-01-21 NOTE — LACTATION NOTE
1/21/2021  Re: Leo Quale 1-17-21)    Dear Dr. López Child saw Kenney Grijalva and his mother Xin Polo in our Lactation office today. Mom came in dorothy due to very damaged nipples with breastfeeding. Date Weight Comments   1-17-21 8-7 Birthweight   1-21-21 7-12.1 [facility] Lactation      Estimated Needs:  By 1-2 weeks, baby needs at least 20-22 oz of breast milk/formula per day based on 8 feedings per day. Baby needs at least 75 ml/2.5 oz of breast milk/formula per feeding. The more often baby eats the less volume they need per feeding. Date Side Position Time Before Wt. After Wt Total   1-21-21 L Laid back cradle 13 min  [id number] 14 ml       Mom cam in today due to very damaged nipples and extreme pain throughout feeding. She has been mostly nursing, but usually only 1 side and is limiting total time at breast to less than 15 minutes. Discussed need to scale back on direct breastfeeding to allow nipples to heal.  Continue at breast a few times each da to keep it familiar, but pump for a break. Mom's current pump (Motif Twist) may be contributing to her issues. Mom purchased a new pump [facility]) and will pick it up today. Once nipples are healed, slowly work back to all breastfeeding. Noted mom exclusively nursed for 1 year with first baby. Will need to follow if damage is recurring once back at breast.  Kenney Grijalva became very fussy, so mom did latch him here. He had some swallows. Pain throughout feeding per mom. Unsure if mom's milk is fully in. Kenney Grijalva only transferred 14 ml. Discussed current milk need per feeding. Mom plans to supplement with formula if not enough breastmilk is available. Plan:  Work on healing nipples. Offer breast 2 times per day to help with healing. Pump and bottle other feedings. For now while supply is increasing, may need to supplement after breastfeeding.   As nursing is going well, add in additional feedings at the breast.  One additional feeding every few days until fully nursing.     Follow-up: To Ped 1-26-21.   Repeat feed and weigh Thursday 1-28-21 at 12pm.  Call as needed before.     Sincerely,      Larry Pitt [address], [address], [address] [place] [address]

## 2021-01-21 NOTE — LACTATION NOTE
Outpatient Feeding Plan for Breastfeeding  [phone]  Patient: Warren Phoenix  Gestational Age: 41w 1d  Diagnosis:  V 24.1/Z39.1 Damaged nipples. [facility]  OB/GYN Dr. Adi Pol[facility]   Start Time:  6884  Stop Time:  8427  +Bustier Fitting    Good, active breastfeeding is when baby is alert, tugging the nipple in long, deep pulls, and you can hear swallows every 1-2 sucks. By now Mom's milk should be in. Brief, light or shallow sucks or short rapid sucks without frequent swallows should not be considered a full breastfeeding. 1. Complete the following mouth exercises prior to feeding:  Gum Massage, Cheek Stretch, Lip Stretch and Non-nutritive Sucking    2. Put the baby to the breast 1-2 times per day for 15 minutes per side (if willing to do both). Finish first side before offering other side. Use: Breast Compression and Breast Massage    3. For now assume he will take ~15 ml (0.5 oz) per side, after breastfeeding, supplement your baby by bottle with 30-60 ml/ 1-2 oz of breast milk/formula: 30ml = 1oz. Position the baby upright to bottle feed. Ensure the nipple is all the way in the baby's mouth and lips are flanged around the bottle. 4. Pump for 5-10 minutes after nursing if only taking 1 side. Try to pump within 15 minutes of breastfeeding. Be consistent. 5. If pumping and bottle feeding, give baby at least 75 ml/2.5 oz of breast milk/formula and double pump with massage and compression for 15 minutes. For today give 30-45 ml per feeding working up to 75 ml per feeding over the next few days. Estimated Needs:  By 1-2 weeks, baby needs at least 20-22 oz of breast milk/formula per day based on 8 feedings per day. Baby needs at least 75 ml/2.5 oz of breast milk/formula per feeding. The more often baby eats the less volume they need per feeding.    AVERAGE INTAKES OF COLOSTRUM BY HEALTHY  INFANTS:    Time  Day Intake (ml/feed)  Based on 8 feedings per day. 1st 24 hrs  1 2-10 ml  24-48 hrs  2 5-15 ml  48-72 hrs  3 15-30 ml (0.5-1 oz)  72-96 hrs  4 30-45 ml (1-1.5oz)                          5-6      45-60 ml (1.5-2oz)                           7          At least 75 ml       Date Weight Comments   1-17-21 8-7 Birthweight   1-21-21 7-12.1 [facility] OP Lactation      Average weight gain for the first 3 months is 1oz per day. Minimum weight gain in the first 3 months is 0.5 oz per day. Typically regaining to birth weight by 2 weeks. Date Side Position Time Before Wt. After Wt Total   1-21-21 L Laid back cradle 13 min  [id number] 14 ml      Handouts given Nipple Damage. Will get aneta and Dr. Clay Hamilton. Baby last ate at 6. Nursed one side. Mom pumped last night and got less than 1 oz total.      Mom cam in today due to very damaged nipples and extreme pain throughout feeding. She has been mostly nursing, but usually only 1 side and is limiting total time at breast to less than 15 minutes. Discussed need to scale back on direct breastfeeding to allow nipples to heal.  Continue at breast a few times each da to keep it familiar, but pump for a break. Mom's current pump (Motif Twist) may be contributing to her issues. Mom purchased a new pump [facility]) and will pick it up today. Once nipples are healed, slowly work back to all breastfeeding. Noted mom exclusively nursed for 1 year with first baby. Will need to follow if damage is recurring once back at breast.  Julius Console became very fussy, so mom did latch him here. He had some swallows. Pain throughout feeding per mom. Unsure if mom's milk is fully in. Julius Console only transferred 14 ml. Discussed current milk need per feeding. Mom plans to supplement with formula if not enough breastmilk is available. Baby's    Oral Digital Assessment:  Tongue wings way up on crying.   Looks cupped, but mid tongue appears rooted. Upper labial frenulum is freya short and thick. Lip is relatively flexible. Tongue over gum with sucking. Cheeks tight. Output:  Soaking wets. Greenish, runny, seedy, frequent stools. Mom's    Nipples:  Well everted. Top half of L nipple face is scabbed. When baby came off, wound ws red and visible under scabbing. Did not assess R side, mom states it is worse. Breasts:  Large. Plan:  Work on healing nipples. Offer breast 2 times per day to help with healing. Pump and bottle other feedings. For now while supply is increasing, may need to supplement after breastfeeding. As nursing is going well, add in additional feedings at the breast.  One additional feeding every few days until fully nursing. Follow-up: To Ped 1-26-21. Repeat feed and weigh Thursday 1-28-21 at 12pm.  Call as needed before.

## 2021-12-02 ENCOUNTER — HOSPITAL ENCOUNTER (OUTPATIENT)
Dept: MAMMOGRAPHY | Age: 36
Discharge: HOME | End: 2021-12-02
Attending: OBSTETRICS & GYNECOLOGY
Payer: MEDICAID

## 2021-12-02 DIAGNOSIS — Z80.3 FAMILY HISTORY OF BREAST CANCER: ICD-10-CM

## 2021-12-02 PROCEDURE — 77067 SCR MAMMO BI INCL CAD: CPT

## 2021-12-06 NOTE — PROGRESS NOTES
Herminia your mammogram was normal. You do have dense breasts and a strong family history of breast cancer. Therefore, the radiologist and I both recommend that you have breast MRI. This would improve diagnosis of small tumors.  Please call us if you want us to schedule that test.

## 2022-02-11 ENCOUNTER — HOSPITAL ENCOUNTER (OUTPATIENT)
Dept: PHYSICAL THERAPY | Age: 37
Discharge: HOME | End: 2022-02-11
Payer: MEDICAID

## 2022-02-11 PROCEDURE — 97110 THERAPEUTIC EXERCISES: CPT

## 2022-02-11 PROCEDURE — 97162 PT EVAL MOD COMPLEX 30 MIN: CPT

## 2022-02-11 NOTE — THERAPY EVALUATION
lia Myers  : 1985      Payor: Romelia Lyns / Plan: [id number] [place] [address]. / Product Type: Managed Care Medicaid /  [facility] [address] [place] [address]. [place] [facility], [address] [place] [address]  [phone]   [phone]       OUTPATIENT PHYSICAL THERAPY:Initial Assessment 2022      ICD-10: Treatment Diagnosis:   Low back pain (M54.5)  Muscle Weakness, Generalized (M62.81)                 Precautions/Allergies:   Patient has no known allergies. Fall Risk Score: 0 (? 5 = High Risk)  MD Orders: Eval and Treat  MEDICAL/REFERRING DIAGNOSIS:  Acute low back pain without sciatica   DATE OF ONSET: chronic over a year  REFERRING PHYSICIAN[facility]  RETURN PHYSICIAN APPOINTMENT: TBD     INITIAL ASSESSMENT:  Ms. Sybil Myers presents to physical therapy with chronic pain complaints, decreased postural and hip/core strength, functional mobility, and increased pain. No pelvic malalignment upon initial evaluation but decreased posture. These S/S are consistent with chronic low back pain. Sybil Myers will benefit from skilled physical therapy (medically necessary) to address above deficits affecting participation in basic ADLs and functional mobility/tolerance. Patient will benefit from manual therapeutic techniques (stretching, joint mobilizations, soft tissue mobilization/myofascial release), therapeutic exercises and activities, postural strengthening/education, and comprehensive home exercises program to address current impairments and functional limitations. PROBLEM LIST (Impacting functional limitations):  1. Decreased Strength  2. Decreased ADL/Functional Activities  3. Decreased Transfer Abilities  4. Decreased Ambulation Ability/Technique  5. Decreased Balance  6. Increased Pain  7. Decreased Activity Tolerance  8. Increased Fatigue  9. Increased Shortness of Breath  10. Decreased Flexibility/Joint Mobility  11.  Decreased [place] with Home Exercise Program INTERVENTIONS PLANNED:  1. Balance Exercise  2. Bed Mobility  3. Cold  4. Cryotherapy  5. Electrical Stimulation  6. Family Education  7. Gait Training  8. Heat  9. Home Exercise Program (HEP)  10. Manual Therapy  11. Neuromuscular Re-education/Strengthening  12. Range of Motion (ROM)  13. Therapeutic Activites  14. Therapeutic Exercise/Strengthening  15. Transfer Training  16. Lumbar Traction  17. Aquatic Therapy   TREATMENT PLAN:  Effective Dates: 2/11/2022 TO 4/12/2022 (60 days). Frequency/Duration: 2 times a week for 60 Days  GOALS: (Goals have been discussed and agreed upon with patient.)     Short-Term Goals~4 weeks  Goal Met   1. Aman Ramos will be independent with HEP 1.  [] Date:   2. Aman Ramos will improve EMEKA score by 8 points 2. [] Date:   3. Aman Ramos will participate in core stabilization exercises to help with stabilization during ADLs 3. [] Date:   4. Aman Ramos will tolerate walking outside for 20 minutes. 4.  [] Date:   5. Aman Ramos will be able to lift and carry 25 pounds without back pain. 5.  [] Date:    Long Term Goals~8 weeks Goal Met   1. Aman Ramos will demonstrate a 15 point improvement on the Oswestry to show improvement in function 1. [] Date:   2. Aman Ramos will be able to perform household chores with minimal back pain. 2.  [] Date:   3. Aman Ramos will be able to lift 50 pounds from the dead lift postion without back pain. 3.  [] Date:   4. Aman Ramos will be able to carry 30 pounds 140' without back pain. 4.  [] Date:                 Outcome Measure: Tool Used: Modified Oswestry Low Back Pain Questionnaire    Score:  Initial: 19/50  Most Recent: X/50 (Date: -- )   Interpretation of Score: Each section is scored on a 0-5 scale, 5 representing the greatest disability. The scores of each section are added together for a total score of 50.       Medical Necessity:   · Skilled intervention continues to be required due to above deficits affecting participation in basic ADLs and overall functional tolerance. Reason for Services/Other Comments:  · Patient continues to require skilled intervention due to  above deficits affecting participation in basic ADLs and overall functional tolerance. Total Treatment Duration:       Rehabilitation Potential For Stated Goals: GOOD  Regarding Herminia Brian's therapy, I certify that the treatment plan above will be carried out by a therapist or under their direction. Thank you for this referral,  Keshav Vickers PT     Referring Physician Signature: Maria De Jesus Gaxiola MD No Signature is Required for this note. HISTORY:   History of Present Injury/Illness (Reason for Referal:   Chronic back pain for 4 years. A year ago had her son and the pain has gotten worse. Her son now weighs 25 lbs. Sates that her pain feels like a needle. -Present symptoms/complaints (on day of evaluation)  Pain Scale:  · Current: 4/10  · Best: 0/10 rarely  · Worst: 8/10      · Aggravating factors: bending, Lifting> 20 lbs and carrying. Pain gets worse as the day goes on. Sleeping is disturbed  a least 4 a night. · Relieving factors: Rest and Heat  · Irritability: Medium (Onset of pain is equal to alleviation)      Past Medical History/Comorbidities:   Ms. Rod Dalton  has a past medical history of Anemia and Breast disorder. She has no past medical history of Abnormal Papanicolaou smear of cervix, Asthma, Chlamydia, Complication of anesthesia, Diabetes ([address] Disease of blood and blood forming organ, DVT (deep venous thrombosis) ([address] Eclampsia, Ectopic pregnancy, Epilepsy ([address] Essential hypertension, Genital herpes, Gestational diabetes, Gestational hypertension, Gonorrhea, Heart abnormality, Herpes gestationis, Herpes simplex virus (HSV) infection, Human immunodeficiency virus (HIV) disease ([address] Hypertension, Hyperthyroidism, Hypothyroidism, Infertility, female, Kidney disease, Liver disease, Molar pregnancy, Nicotine vapor product user, Non-nicotine vapor product user, Other unknown and unspecified cause of morbidity or mortality, Phlebitis and thrombophlebitis, Pituitary disorder ([facility] Polycystic disease, ovaries, Postpartum depression, Preeclampsia,  delivery, Psychiatric problem, Rh negative state in antepartum period, Rhesus isoimmunization affecting pregnancy, Sickle cell disease ([facility] Sickle cell trait syndrome ([facility] Syphilis, Systemic lupus erythematosus ([facility] Thyroid activity decreased, Trauma, Type I diabetes mellitus ([facility] or  (vaginal birth after ). Ms. Arnaud Frazier  has a past surgical history that includes hx wisdom teeth extraction; hx refractive surgery; and hx dilation and curettage.   Social History/Living Environment:     two children    Social History     Socioeconomic History    Marital status:      Spouse name: Not on file    Number of children: Not on file    Years of education: Not on file    Highest education level: Not on file   Occupational History    Not on file   Tobacco Use    Smoking status: Former Smoker     Packs/day: 0.25     Years: 2.00     Pack years: 0.50     Quit date: 2/10/2017     Years since quittin.0    Smokeless tobacco: Never Used   Vaping Use    Vaping Use: Never used   Substance and Sexual Activity    Alcohol use: No    Drug use: No    Sexual activity: Yes     Partners: Male     Birth control/protection: Inserts   Other Topics Concern     Service Not Asked    Blood Transfusions Not Asked    Caffeine Concern Not Asked    Occupational Exposure Not Asked    Hobby Hazards Not Asked    Sleep Concern Not Asked    Stress Concern Not Asked    Weight Concern Not Asked    Special Diet Not Asked    Back Care Not Asked    Exercise Not Asked    Bike Helmet Not Asked    [address] Not Asked    Self-Exams Not Asked   Social History Narrative    Not on file     Social Determinants of Health Financial Resource Strain:     Difficulty of Paying Living Expenses: Not on file   Food Insecurity:     Worried About Running Out of Food in the Last Year: Not on file    [place] of Food in the Last Year: Not on file   Transportation Needs:     Lack of Transportation (Medical): Not on file    Lack of Transportation (Non-Medical):  Not on file   Physical Activity:     Days of Exercise per Week: Not on file    Minutes of Exercise per Session: Not on file   Stress:     Feeling of Stress : Not on file   Social Connections:     Frequency of Communication with Friends and Family: Not on file    Frequency of Social Gatherings with Friends and Family: Not on file    Attends Yazidi Services: Not on file    Active Member of [address] [place] [address] [place] or Organizations: Not on file    Attends Club or Organization Meetings: Not on file    Marital Status: Not on file   Intimate Partner Violence:     Fear of Current or Ex-Partner: Not on file    Emotionally Abused: Not on file    Physically Abused: Not on file    Sexually Abused: Not on file   Housing Stability:     Unable to Pay for Housing in the Last Year: Not on file    Number of [place] in the Last Year: Not on file    Unstable Housing in the Last Year: Not on file     Prior Level of Function/Work/Activity:  Normal  Previous Treatment Approach  self treat    Active Ambulatory Problems     Diagnosis Date Noted    Encounter for supervision of low-risk pregnancy, antepartum 10/12/2020    GERD (gastroesophageal reflux disease) 10/12/2020    Other hemorrhoids 10/12/2020     Resolved Ambulatory Problems     Diagnosis Date Noted    Supervision of low-risk first pregnancy 08/29/2017    Amniotic fluid leaking 10/08/2017    False labor after 37 weeks of gestation without delivery 10/15/2017    Normal labor 10/16/2017    Constipation 10/12/2020    Low back pain during pregnancy, third trimester 11/28/2020    Vaginal discharge in pregnancy in third trimester 01/08/2021     Past Medical History:   Diagnosis Date    Anemia     Breast disorder      Note: Patient denies any increase of symptoms with cough, sneeze or valsalva. Patient denies any saddle paresthesia or bowel/bladder deficits. Current Medications:    Current Outpatient Medications:     PNV#16-Iron Fum & PS-FA-OM-3 35-1-200 mg cap, Take  by mouth., Disp: , Rfl:     ethinyl estradiol-etonogestrel (NUVARING) 0.12-0.015 mg/24 hr vaginal ring, Insert one ring into vagina, leave for 3 weeks. Insert new ring one week after removal, Disp: 3 Ring, Rfl: 4      Ambulatory/Rehab Services H2 Model Falls Risk Assessment    Risk Factors:       No Risk Factors Identified Ability to Rise from Chair:       (0)  Ability to rise in a single movement    Falls Prevention Plan:       Physical Limitations to Exercise (specify):  none   Total: (5 or greater = High Risk): 0    ©2010 [facility] [employer]. All Rights Reserved. [facility] [employer] Patent #[id number]. Federal Law prohibits the replication, distribution or use without written permission from [facility] [employer]       Date Last Reviewed:  2/11/2022   Number of Personal Factors/Comorbidities that affect the Plan of Care: 1-2: MODERATE COMPLEXITY   EXAMINATION:   Observation/Orthostatic Postural Assessment:           Forward Head and Rounded Shoulders increased lordosis  Palpation:          Increased Tenderness to grade 3 PAs T 11-12    ROM:            AROM/PROM       RIGHT LEFT   Knee Extension  WNL  WNL   Knee Flexion  WNL  WNL   Hip Flexion  WNL  WNL   Hip Abduction  WNL  WNL     Lumbar ROM     Movement Range Descriptor Degrees Notes   Flexion Hands to Shin Pain at End Range     Extension Spine of Scapula to midline Pain at End Range         Repeated Motion:  Direction    Frequency Symptoms Prior Symptons Post   Flexion Repeated 10 times  Unremarkable   Extension Sustained Standing  Increased Symptoms         Strength:      RIGHT LEFT   Knee Flexion (L5-S2)  4/5 4/5   Knee Extension (L3, L4)  4/5  4/5   Hip Flexion (L1, L2)  4/5  4/5   Hip Extension  3+/5  3+/5   Hip Abduction (L5, S1)  3+/5  3+/5   Ankle Dorsiflexion (L4)  5/5  5/5   Great Toe Extension (L5)  4/5  4/5   Ankle Plantar Flexion (S1-S2)  4/5  4/5       Special Tests:  Lumbar:  SLR: Negative  SLUMP: Negative   SI Joint:  Not Tested   Hip:  normal         Neurological Screen:    RADIATING SYMPTOMS: No      Functional Mobility:  Affecting participation in basic ADLs and functional tasks. Balance and Mobility:    Test Result   Timed up and Go  Seconds   30 second Sit to Stand 7   6 Minute Walk Test    Single Leg Balance Right: <6 seconds     Left:<6 seconds          Body Structures Involved:    1. Muscles   Body Functions Affected:    1. Neuromusculoskeletal  2. Movement Related Activities and Participation Affected:  1. Mobility  2. Material handling   Number of elements that affect the Plan of Care: 3: MODERATE COMPLEXITY   CLINICAL PRESENTATION:   Presentation: Evolving clinical presentation with changing clinical characteristics: MODERATE COMPLEXITY   CLINICAL DECISION MAKING:      Use of outcome tool(s) and clinical judgement create a POC that gives a: Questionable prediction of patient's progress: MODERATE COMPLEXITY     See associated treatment note for treatment provided today      No future appointments.       Last Yin, PT     remember to save as eval note

## 2022-02-11 NOTE — PROGRESS NOTES
Katelyn Tanesha  : 1985  Payor: Kiarra Alcazar / Plan: [id number] . / Product Type: Managed Care Medicaid /  [address] [place] [address] at [address] [place] [address]. [facility], [address] [place] [address]  [phone]   [phone]                                                          Hamilton Jean MD      OUTPATIENT PHYSICAL THERAPY: Daily Treatment Note 2022 Visit Count:  1    Tx Diagnosis:  Low back pain (M54.5)  Muscle Weakness, Generalized (M62.81)      Pre-treatment Symptoms/Complaints: See Initial Eval Dated 22 for more details. Pain: Initial:4/10  Medications Last Reviewed:  2022     Post Session: 4/10   Updated Objective Findings: See Initial Eval for more details. TREATMENT:   THERAPEUTIC EXERCISE: (15 minutes):  Exercises per grid below to improve mobility, strength and balance. Required minimal visual, verbal and manual cues to promote proper body alignment and promote proper body posture. Progressed resistance and complexity of movement as indicated. Date:  2022 Date:   Date:     Activity/Exercise Parameters Parameters Parameters   Education HEP, POC, PT goals, anatomy/pathology, pain science ed cup analogy, walking 20 min goal                                               THERAPEUTIC ACTIVITY: ( 0 minutes): Activities per gid below to improve functional movement related mobility, strength and balance to improve neuro-muscular carryover to daily functional activities for improving patient's quality of life. Required visual, verbal and manual cues to promote proper body alignment and promote proper body posture/mechanics. Progressed resistance and complexity of movement as indicated.      Date:  2022 Date:   Date:     Activity/Exercise Parameters Parameters Parameters                                                                               MANUAL THERAPY: (0 minutes): Joint mobilization, Soft tissue mobilization was utilized and necessary because of the patient's restricted joint motion and restricted motion of soft tissue mobility. Date  2/11/2022    Technique Used Grade  Level # Time(s) Effect while being performed                                                                 HEP Log Date 1.    2/11/2022   2.  2/11/2022   3. 2/11/2022   4.    5.           [employer] Portal  Treatment/Session Summary:    Response to Treatment: Pt demonstrated understanding of POC and initial HEP. No increase in pain or adverse reactions. Communication/Consultation:  POC, HEP, PT goals, Faxed initial evaluation to MD.   Equipment provided today: HEP Handout   Recommendations/Intent for next treatment session:   Next visit will focus on Core Stability Pain Science Education Quad strengthening Hip strengthening. Treatment Plan of Care Effective Dates: 2/11/2022 TO 4/12/2022 (60 days).   Frequency/Duration: 2 times a week for 60 Days             Total Treatment Billable Duration:   15  Rx plus Joanne Kruger, PT    Future Appointments   Date Time Provider Lucius Zayas   2/14/2022  8:00 AM Papenfuss, Bradd Rival, PT [facility]   2/17/2022 11:00 AM Papenfuss, Bradd Rival, PT [facility]   2/21/2022  8:00 AM Papenfuss, Bradd Rival, PT [facility]   2/24/2022  8:45 AM Papenfuss, Bradd Rival, PT [facility]   2/28/2022  8:00 AM Papenfuss, Bradd Rival, PT [facility]   3/3/2022  9:30 AM Papenfuss, Bradd Rival, PT [facility]   3/7/2022  8:00 AM Papenfuss, Bradd Rival, PT [facility]   3/10/2022  9:30 AM Papenfuss, Bradd Rival, PT [facility]

## 2022-02-14 ENCOUNTER — HOSPITAL ENCOUNTER (OUTPATIENT)
Dept: PHYSICAL THERAPY | Age: 37
Discharge: HOME | End: 2022-02-14
Payer: MEDICAID

## 2022-02-14 PROCEDURE — 97110 THERAPEUTIC EXERCISES: CPT

## 2022-02-14 NOTE — PROGRESS NOTES
Kath Muro  : 1985  Payor: Jory Marlow / Plan: [id number] / Product Type: Managed Care Medicaid /  [id number] [place]. [facility], [address] [place] [address], [address] [place] [address]  [phone]   [phone]                                                          Hamilton Jean MD      OUTPATIENT PHYSICAL THERAPY: Daily Treatment Note 2022 Visit Count:  2    Tx Diagnosis:  Low back pain (M54.5)  Muscle Weakness, Generalized (M62.81)      Pre-treatment Symptoms/Complaints: I joined [employer] last week and started with a swimming class, and walked outside for 30 min on Saturday   Pain: Initial:4/10  Medications Last Reviewed:  2022     Post Session: 4/10   Updated Objective Findings: See Initial Eval for more details. TREATMENT:   THERAPEUTIC EXERCISE: (15 minutes):  Exercises per grid below to improve mobility, strength and balance. Required minimal visual, verbal and manual cues to promote proper body alignment and promote proper body posture. Progressed resistance and complexity of movement as indicated. Date:  2022 Date:  22 Date:     Activity/Exercise Parameters Parameters Parameters   Education HEP, POC, PT goals, anatomy/pathology, pain science ed cup analogy, walking 20 min goal     Treadmill  2.5 mph 8 min     Overhead reach with pole warmup  20 x    Cat camel   20 x     Hip hinge quadruped  20 x     Squat   3 x 10 5#     Overhead press   Bar 10 x  10# 10 x,5 x 2     Hip hinge pole  3 x 10     Resisted lateral walk  60' med resistance band          THERAPEUTIC ACTIVITY: ( 0 minutes): Activities per gid below to improve functional movement related mobility, strength and balance to improve neuro-muscular carryover to daily functional activities for improving patient's quality of life. Required visual, verbal and manual cues to promote proper body alignment and promote proper body posture/mechanics.  Progressed resistance and complexity of movement as indicated. Date:  2/14/2022 Date:   Date:     Activity/Exercise Parameters Parameters Parameters                                                                               MANUAL THERAPY: (0 minutes): Joint mobilization, Soft tissue mobilization was utilized and necessary because of the patient's restricted joint motion and restricted motion of soft tissue mobility. Date  2/14/2022    Technique Used Grade  Level # Time(s) Effect while being performed                                                                 HEP Log Date 1. Press, squat, hip hinge, lateral walk 2/14/2022   2.  2/14/2022   3. 2/14/2022   4.    5.           Healthcare Interactive Portal  Treatment/Session Summary:    Response to Treatment: Pt demonstrated understanding of POC and initial HEP. No increase in pain or adverse reactions. Communication/Consultation:  POC, HEP, PT goals, Faxed initial evaluation to MD.   Equipment provided today: HEP Handout   Recommendations/Intent for next treatment session:   Next visit will focus on Core Stability Pain Science Education Quad strengthening Hip strengthening. Treatment Plan of Care Effective Dates: 2/14/2022 TO 4/12/2022 (60 days).   Frequency/Duration: 2 times a week for 60 Days             Total Treatment Billable Duration:   40   PT Patient Time In/Time Out  Time In: 0802  Time Out: [address], PT    Future Appointments   Date Time Provider Lucius Zayas   2/17/2022 11:00 AM PapenfuFaviola kinga, PT [facility] AND [facility]   2/21/2022  8:00 AM PapenfuFaviola king Penta, PT [facility]   2/24/2022  8:45 AM PapenfussFaviola Penta, PT [facility]   2/28/2022  8:00 AM PapenfussFaviola Penta, PT [facility]   3/3/2022  9:30 AM PapenfussFaviola Penta, PT [facility]   3/7/2022  8:00 AM PapenfussFaviola Penta, PT [facility]   3/10/2022  9:30 AM PapenfussFaviola Penta, PT [facility]

## 2022-02-17 ENCOUNTER — HOSPITAL ENCOUNTER (OUTPATIENT)
Dept: PHYSICAL THERAPY | Age: 37
Discharge: HOME | End: 2022-02-17
Payer: MEDICAID

## 2022-02-17 PROCEDURE — 97110 THERAPEUTIC EXERCISES: CPT

## 2022-02-17 NOTE — PROGRESS NOTES
Colonel Sotot  : 1985  Payor: Rafael Wright / Plan: [id number] / Product Type: Managed Care Medicaid /  Betsy Orellanael at [address] [facility]. [facility], [address] [place] [address], [facility], [address] [place] [address]  [phone]   [phone]                                                          Hamilton Jean MD      OUTPATIENT PHYSICAL THERAPY: Daily Treatment Note 2022 Visit Count:  3    Tx Diagnosis:  Low back pain (M54.5)  Muscle Weakness, Generalized (M62.81)      Pre-treatment Symptoms/Complaints: I joined [employer] last week and started with a swimming class, and walked outside for 30 min on Saturday   Pain: Initial:4/10  Medications Last Reviewed:  2022     Post Session: 4/10   Updated Objective Findings: See Initial Eval for more details. TREATMENT:   THERAPEUTIC EXERCISE: (40 minutes):  Exercises per grid below to improve mobility, strength and balance. Required minimal visual, verbal and manual cues to promote proper body alignment and promote proper body posture. Progressed resistance and complexity of movement as indicated. Date:  2022 Date:  22 Date:  22   Activity/Exercise Parameters Parameters Parameters   Education HEP, POC, PT goals, anatomy/pathology, pain science ed cup analogy, walking 20 min goal     Treadmill  2.5 mph 8 min  5 min    Overhead reach with pole warmup  20 x 20   Cat camel   20 x  20   Hip hinge quadruped  20 x  20    Squat   3 x 10 5#  Circuit 4 x 10 10#    Overhead press   Bar 10 x  10# 10 x,5 x 2  CIrcuit DB 5 # 10 x 4 rounds   Hip hinge pole  3 x 10  10 x   Resisted lateral walk  60' med resistance band 60' medium circuit 4 rounds   RDL    10 # 4 x 10  Circuit 4 rounds   Cool down child pose to cobra, knees to chest, lower trunk rotation   10 x ea          THERAPEUTIC ACTIVITY: ( 0 minutes):  Activities per gid below to improve functional movement related mobility, strength and balance to improve neuro-muscular carryover to daily functional activities for improving patient's quality of life. Required visual, verbal and manual cues to promote proper body alignment and promote proper body posture/mechanics. Progressed resistance and complexity of movement as indicated. Date:  2/17/2022 Date:   Date:     Activity/Exercise Parameters Parameters Parameters                                                                                        HEP Log Date 1. Press, squat, hip hinge, lateral walk 2/14//2022   2. RDL  2/17/2022   3. 2/17/2022   4.    5.           [employer] Portal  Treatment/Session Summary:    Response to Treatment: Pt demonstrated understanding of POC and initial HEP. No increase in pain or adverse reactions.  post circuit training   Communication/Consultation:  POC, HEP, PT goals, Faxed initial evaluation to MD. walking for post ex soreness   Equipment provided today: HEP Handout RDL   Recommendations/Intent for next treatment session:   Next visit will focus on Core Stability Pain Science Education Quad strengthening Hip strengthening. Treatment Plan of Care Effective Dates: 2/17/2022 TO 4/12/2022 (60 days).   Frequency/Duration: 2 times a week for 60 Days             Total Treatment Billable Duration: 40   PT Patient Time In/Time Out  Time In: 1103  Time Out: [address] [place], PT    Future Appointments   Date Time Provider Lucius Zayas   2/21/2022  8:00 AM Papenfuss, Nazareth Odor, PT [facility]   2/24/2022  8:45 AM [facility]enfuss, [facility]en Odor, [facility]   2/28/2022  8:00 AM Papenfuss, Carmen Odor, PT [facility]   3/3/2022  9:30 AM Papenfuss, Carmen Odor, PT [facility]   3/7/2022  8:00 AM Papenfuss, Nazareth Odor, PT [facility]   3/10/2022  9:30 AM Papenfuss, Nazareth Odor, [facility]

## 2022-02-21 ENCOUNTER — HOSPITAL ENCOUNTER (OUTPATIENT)
Dept: PHYSICAL THERAPY | Age: 37
Discharge: HOME | End: 2022-02-21
Payer: MEDICAID

## 2022-02-21 PROCEDURE — 97110 THERAPEUTIC EXERCISES: CPT

## 2022-02-21 NOTE — PROGRESS NOTES
Nguyễn Becerra  : 1985  Payor: Ryanoswald Suarez / [address]: [id number] / Product Type: Managed Care Medicaid /  Cromona Denae at [place] [address]. [facility], [address] [place] [address], [address] [place] [address]  [phone]   [phone]                                                          Hamilton Jean MD      OUTPATIENT PHYSICAL THERAPY: Daily Treatment Note 2022 Visit Count:  4    Tx Diagnosis:  Low back pain (M54.5)  Muscle Weakness, Generalized (M62.81)      Pre-treatment Symptoms/Complaints: I joined [employer] last week and started with a swimming class, and walked outside for 30 min on Saturday   Pain: Initial:4/10  Medications Last Reviewed:  2022     Post Session: 4/10   Updated Objective Findings: See Initial Eval for more details. TREATMENT:   THERAPEUTIC EXERCISE: (40 minutes):  Exercises per grid below to improve mobility, strength and balance. Required minimal visual, verbal and manual cues to promote proper body alignment and promote proper body posture. Progressed resistance and complexity of movement as indicated. Date:  2022 Date:  22 Date:  22 Date  22   Activity/Exercise Parameters Parameters Parameters    Education HEP, POC, PT goals, anatomy/pathology, pain science ed cup analogy, walking 20 min goal      Treadmill  2.5 mph 8 min  5 min  6 min    Overhead reach with pole warmup  20 x 20 20   Cat camel   20 x  20 20   Hip hinge quadruped  20 x  20  20   Squat   3 x 10 5#  Circuit 4 x 10 10#  3 x 10 15#    Overhead press   Bar 10 x  10# 10 x,5 x 2  CIrcuit DB 5 # 10 x 4 rounds 7# 3 x 10    Hip hinge pole  3 x 10  10 x 2 x 10   Resisted lateral walk  60' med resistance band 60' medium circuit 4 rounds 3 rounds 60\" heavy   RDL    10 # 4 x 10  Circuit 4 rounds 15# 3 x 10   Cool down child pose to cobra, knees to chest, lower trunk rotation   10 x ea  20 x         THERAPEUTIC ACTIVITY: ( 0 minutes):  Activities per gid below to improve functional movement related mobility, strength and balance to improve neuro-muscular carryover to daily functional activities for improving patient's quality of life. Required visual, verbal and manual cues to promote proper body alignment and promote proper body posture/mechanics. Progressed resistance and complexity of movement as indicated. Date:  2/21/2022 Date:   Date:     Activity/Exercise Parameters Parameters Parameters                                                                                        HEP Log Date 1. Press, squat, hip hinge, lateral walk 2/14//2022   2. RDL  2/17/2022   3. 2/21/2022   4.    5.           [employer] Portal  Treatment/Session Summary:    Response to Treatment: Pt demonstrated understanding of POC and initial HEP. No increase in pain or adverse reactions.  post circuit training   Communication/Consultation:  POC, HEP, PT goals, Faxed initial evaluation to MD. walking for post ex soreness   Equipment provided today: HEP Handout RDL   Recommendations/Intent for next treatment session:   Next visit will focus on Core Stability Pain Science Education Quad strengthening Hip strengthening. Treatment Plan of Care Effective Dates: 2/21/2022 TO 4/12/2022 (60 days).   Frequency/Duration: 2 times a week for 60 Days             Total Treatment Billable Duration: 40   PT Patient Time In/Time Out  Time In: 0801  Time Out: [address], PT    Future Appointments   Date Time Provider Lucius Zayas   2/24/2022  8:45 AM Clarissa De La Cruz, PT [facility]   2/28/2022  8:00 AM [facility] La [facility], [facility]   3/3/2022  9:30 AM Clarissa De La Cruz, [facility]   3/7/2022  8:00 AM Clairssa De La Cruz, PT [facility]   3/10/2022  9:30 AM Clarissa De La Cruz, PT [facility]

## 2022-02-21 NOTE — PROGRESS NOTES
Lamar Jorgensen  : 1985  Payor: Glynn Shaper / Plan: [id number] / Product Type: Managed Care Medicaid /  [address] [place]. [facility], [address] [place] [address], [facility], [address] [place] [address]  [phone]   [phone]                                                          Hamilton Jean MD      OUTPATIENT PHYSICAL THERAPY: Daily Treatment Note 2022 Visit Count:  4    Tx Diagnosis:  Low back pain (M54.5)  Muscle Weakness, Generalized (M62.81)      Pre-treatment Symptoms/Complaints: Swam on Saturday and did her ex much less pain. Pain: Initial: did not rate  Medications Last Reviewed:  2022     Post Session: did not rate   Updated Objective Findings: full spinal motion no pain complaints today. TREATMENT:   THERAPEUTIC EXERCISE: (44 minutes):  Exercises per grid below to improve mobility, strength and balance. Required minimal visual, verbal and manual cues to promote proper body alignment and promote proper body posture. Progressed resistance and complexity of movement as indicated.      Date:  2022 Date:  22 Date:  22 Date  22   Activity/Exercise Parameters Parameters Parameters    Education HEP, POC, PT goals, anatomy/pathology, pain science ed cup analogy, walking 20 min goal      Treadmill  2.5 mph 8 min  5 min  6 min    Overhead reach with pole warmup  20 x 20 Band pull aparts 10 x    Cat camel   20 x  20 10 x   Hip hinge quadruped  20 x  20  10 x   Bird dog        Squat   3 x 10 5#  Circuit 4 x 10 10#  15#  3 x 10    Overhead press   Bar 10 x  10# 10 x,5 x 2  CIrcuit DB 5 # 10 x 4 rounds 6# DB press 3 x 10    Hip hinge pole  3 x 10  10 x    Resisted lateral walk  60' med resistance band 60' medium circuit 4 rounds    RDL    10 # 4 x 10  Circuit 4 rounds 15# 3 x 10 circuit    Cool down child pose to cobra, knees to chest, lower trunk rotation   10 x ea     Bent over rows     2 x 10 6#    Bent over shoulder extension    2 x 10 3#   Cable walk backs     23# 6 x         THERAPEUTIC ACTIVITY: ( 0 minutes): Activities per gid below to improve functional movement related mobility, strength and balance to improve neuro-muscular carryover to daily functional activities for improving patient's quality of life. Required visual, verbal and manual cues to promote proper body alignment and promote proper body posture/mechanics. Progressed resistance and complexity of movement as indicated. Date:  2/21/2022 Date:   Date:     Activity/Exercise Parameters Parameters Parameters                                                                                        HEP Log Date 1. Press, squat, hip hinge, lateral walk 2/14//2022   2. RDL  2/17/2022   3. 2/21/2022   4.    5.           [employer] Portal  Treatment/Session Summary:    Response to Treatment: Pt demonstrated understanding of POC and initial HEP. No increase in pain or adverse reactions.  post circuit training   Communication/Consultation:  POC, HEP, PT goals, Faxed initial evaluation to MD. walking for post ex soreness   Equipment provided today: HEP Handout RDL   Recommendations/Intent for next treatment session:   Next visit will focus on Core Stability Pain Science Education Quad strengthening Hip strengthening. Treatment Plan of Care Effective Dates: 2/21/2022 TO 4/12/2022 (60 days).   Frequency/Duration: 2 times a week for 60 Days             Total Treatment Billable Duration: 44 min    PT Patient Time In/Time Out  Time In: 0801  Time Out: [address] , PT    Future Appointments   Date Time Provider Lucius Zayas   2/24/2022  8:45 AM Rayshawn De La Cruz, PT [facility]   2/28/2022  8:00 AM Rayshawn De La Cruz, PT [facility]   3/3/2022  9:30 AM Rayshawn De La Cruz, PT [facility]   3/7/2022  8:00 AM Rayshawn De La Cruz, PT [facility]   3/10/2022  9:30 AM Rayshawn De La Cruz, PT [facility]

## 2022-02-24 ENCOUNTER — HOSPITAL ENCOUNTER (OUTPATIENT)
Dept: PHYSICAL THERAPY | Age: 37
Discharge: HOME | End: 2022-02-24
Payer: MEDICAID

## 2022-02-24 PROCEDURE — 97530 THERAPEUTIC ACTIVITIES: CPT

## 2022-02-24 PROCEDURE — 97110 THERAPEUTIC EXERCISES: CPT

## 2022-02-24 NOTE — PROGRESS NOTES
Kia Holland  : 1985  Payor: Yessy Meza / Plan: [id number] / Product Type: Managed Care Medicaid /  [address] [place]. [facility], [address] [place] [address], [facility], [address] [place] [address]  [phone]   [phone]                                                          Hamilton Jean MD      OUTPATIENT PHYSICAL THERAPY: Daily Treatment Note 2022 Visit Count:  5    Tx Diagnosis:  Low back pain (M54.5)  Muscle Weakness, Generalized (M62.81)      Pre-treatment Symptoms/Complaints: Patient interested in crossfit program   Pain: Initial: did not rate  Medications Last Reviewed:  2022     Post Session: did not rate   Updated Objective Findings: full spinal motion no pain complaints today. TREATMENT:   THERAPEUTIC EXERCISE: (33 minutes):  Exercises per grid below to improve mobility, strength and balance. Required minimal visual, verbal and manual cues to promote proper body alignment and promote proper body posture. Progressed resistance and complexity of movement as indicated.      Date:  2022 Date:  22 Date:  22 Date  22 Date  22   Activity/Exercise Parameters Parameters Parameters     Education HEP, POC, PT goals, anatomy/pathology, pain science ed cup analogy, walking 20 min goal       Treadmill  2.5 mph 8 min  5 min  6 min  6 min    Overhead reach with pole warmup  20 x 20 Band pull aparts 10 x  Band pull aparts 10 x red   Cat camel   20 x  20 10 x    Hip hinge quadruped  20 x  20  10 x    Bird dog         Squat   3 x 10 5#  Circuit 4 x 10 10#  15#  3 x 10  25#10 x circuit  4 rounds   Overhead press   Bar 10 x  10# 10 x,5 x 2  CIrcuit DB 5 # 10 x 4 rounds 6# DB press 3 x 10  8# 3 x 10 circuit 4 rounds   Hip hinge pole  3 x 10  10 x     Resisted lateral walk  60' med resistance band 60' medium circuit 4 rounds  60' heavy band   Monster walk      60' heavy band    RDL    10 # 4 x 10  Circuit 4 rounds 15# 3 x 10 circuit  20# 5 x 4 rounds   Cool down child pose to cobra, knees to chest, lower trunk rotation   10 x ea   10 x ea   Bent over rows     2 x 10 6#     Bent over shoulder extension    2 x 10 3#    Cable walk backs     23# 6 x          THERAPEUTIC ACTIVITY: 10 minutes): Activities per gid below to improve functional movement related mobility, strength and balance to improve neuro-muscular carryover to daily functional activities for improving patient's quality of life. Required visual, verbal and manual cues to promote proper body alignment and promote proper body posture/mechanics. Progressed resistance and complexity of movement as indicated. Date:  2/24/2022 Date:   Date:     Activity/Exercise Parameters Parameters Parameters   Farmer carry  20# 100' circuit 4 rounds        Sled pull   120' 70#         DB press   8# 3 x 10                                                              HEP Log Date 1. Press, squat, hip hinge, lateral walk 2/14//2022   2. RDL  2/17/2022   3. 2/24/2022   4.    5.           [employer] Portal  Treatment/Session Summary:    Response to Treatment: Pt demonstrated understanding of POC and initial HEP. No increase in pain or adverse reactions.  post circuit training   Communication/Consultation:  POC, HEP, PT goals, Faxed initial evaluation to MD. walking for post ex soreness   Equipment provided today: none   Recommendations/Intent for next treatment session:   Next visit will focus on introducing barbell lifts          Treatment Plan of Care Effective Dates: 2/24/2022 TO 4/12/2022 (60 days).   Frequency/Duration: 2 times a week for 60 Days             Total Treatment Billable Duration: 43 min    PT Patient Time In/Time Out  Time In: 3873  Time Out: [address] [facility], PT    Future Appointments   Date Time Provider Lucius Zayas   2/28/2022  8:00 AM Faviola De La Cruz, PT [facility]   3/3/2022  9:30 AM Faviola De La Cruz, PT [facility] 3/4/2022  9:30 AM [facility]   3/7/2022  8:00 AM Tiffanie De La Cruz, PT [facility]   3/10/2022  9:30 AM [facility]

## 2022-02-28 ENCOUNTER — APPOINTMENT (OUTPATIENT)
Dept: PHYSICAL THERAPY | Age: 37
End: 2022-02-28
Payer: MEDICAID

## 2022-03-03 ENCOUNTER — HOSPITAL ENCOUNTER (OUTPATIENT)
Dept: PHYSICAL THERAPY | Age: 37
Discharge: HOME | End: 2022-03-03
Payer: MEDICAID

## 2022-03-03 PROCEDURE — 97530 THERAPEUTIC ACTIVITIES: CPT

## 2022-03-03 PROCEDURE — 97110 THERAPEUTIC EXERCISES: CPT

## 2022-03-03 NOTE — PROGRESS NOTES
Javed Hunter  : 1985  Payor: Lupe Yee / Plan: [id number] / Product Type: Managed Care Medicaid /  [address] at [address]. [facility], [address] [place] [address], [facility], [address] [place] [address]  [phone]   [phone]                                                          Hamilton Jean MD      OUTPATIENT PHYSICAL THERAPY: Daily Treatment Note 3/3/2022 Visit Count:  6    Tx Diagnosis:  Low back pain (M54.5)  Muscle Weakness, Generalized (M62.81)      Pre-treatment Symptoms/Complaints: Patient wants to know how to transfer her ill father bed to chair without hurting her back. Pain: Initial: did not rate  Medications Last Reviewed:  3/3/2022     Post Session: did not rate   Updated Objective Findings: full spinal motion no pain complaints today. TREATMENT:   THERAPEUTIC EXERCISE: (8 minutes):  Exercises per grid below to improve mobility, strength and balance. Required minimal visual, verbal and manual cues to promote proper body alignment and promote proper body posture. Progressed resistance and complexity of movement as indicated.      Date:  2022 Date:  22 Date:  22 Date  22 Date  22 Date 3/3/22   Activity/Exercise Parameters Parameters Parameters      Education HEP, POC, PT goals, anatomy/pathology, pain science ed cup analogy, walking 20 min goal        Treadmill  2.5 mph 8 min  5 min  6 min  6 min  6 min    Overhead reach with pole warmup  20 x 20 Band pull aparts 10 x  Band pull aparts 10 x red Banded rows   Cat camel   20 x  20 10 x     Hip hinge quadruped  20 x  20  10 x     Bird dog          Squat   3 x 10 5#  Circuit 4 x 10 10#  15#  3 x 10  25#10 x circuit  4 rounds    Overhead press   Bar 10 x  10# 10 x,5 x 2  CIrcuit DB 5 # 10 x 4 rounds 6# DB press 3 x 10  8# 3 x 10 circuit 4 rounds    Hip hinge pole  3 x 10  10 x      Resisted lateral walk  60' med resistance band 60' medium circuit 4 rounds  60' heavy band Monster walk      60' heavy band     RDL    10 # 4 x 10  Circuit 4 rounds 15# 3 x 10 circuit  20#  5 x 4 rounds    Cool down child pose to cobra, knees to chest, lower trunk rotation   10 x ea   10 x ea    Bent over rows     2 x 10 6#      Bent over shoulder extension    2 x 10 3#     Cable walk backs     23# 6 x                    THERAPEUTIC ACTIVITY: 35 minutes): Activities per gid below to improve functional movement related mobility, strength and balance to improve neuro-muscular carryover to daily functional activities for improving patient's quality of life. Required visual, verbal and manual cues to promote proper body alignment and promote proper body posture/mechanics. Progressed resistance and complexity of movement as indicated. Date:  2/24/2022 Date:  3/3/22 Date:     Activity/Exercise Parameters Parameters Parameters   Uday Gambino carry  20# 100' circuit 4 rounds        Sled pull   120' 70#         DB press   8# 3 x 10        Back squat    20# 2 x 5   30# 5 x   45# 3 x 5      deadlift    45# 3 x 5      Transfer training sit to stand, supine to sit, floor to stand    15 min use of belt                               HEP Log Date 1. Press, squat, hip hinge, lateral walk 2/14//2022   2. RDL  2/17/2022   3. 3/3/2022   4.    5.           [employer] Portal  Treatment/Session Summary:    Response to Treatment: No pain complaints   Communication/Consultation:  Transfer training, barbell lifting   Equipment provided today: none   Recommendations/Intent for next treatment session:   Next visit will focus on introducing barbell lifts          Treatment Plan of Care Effective Dates: 3/3/2022 TO 4/12/2022 (60 days).   Frequency/Duration: 2 times a week for 60 Days             Total Treatment Billable Duration: 43 min    PT Patient Time In/Time Out  Time In: 9306  Time Out: [address], PT    Future Appointments   Date Time Provider Lucius Zayas   3/4/2022  9:30 AM DONTAE MRI UNIT 1 [facility]   3/7/2022  8:00 AM Papenfuss, Beauford Bumpers, PT [facility]   3/10/2022  9:30 AM Papenfuss, Beauford Bumpers, PT [facility]   3/14/2022  8:45 AM Papenfuss, Beauford Bumpers, PT [facility]

## 2022-03-04 ENCOUNTER — HOSPITAL ENCOUNTER (OUTPATIENT)
Dept: MRI IMAGING | Age: 37
Discharge: HOME | End: 2022-03-04
Attending: OBSTETRICS & GYNECOLOGY

## 2022-03-04 DIAGNOSIS — R92.2 DENSE BREAST TISSUE ON MAMMOGRAM: ICD-10-CM

## 2022-03-04 DIAGNOSIS — Z80.3 FAMILY HISTORY OF BREAST CANCER: ICD-10-CM

## 2022-03-07 ENCOUNTER — HOSPITAL ENCOUNTER (OUTPATIENT)
Dept: PHYSICAL THERAPY | Age: 37
Discharge: HOME | End: 2022-03-07
Payer: MEDICAID

## 2022-03-07 PROCEDURE — 97110 THERAPEUTIC EXERCISES: CPT

## 2022-03-07 PROCEDURE — 97530 THERAPEUTIC ACTIVITIES: CPT

## 2022-03-07 NOTE — PROGRESS NOTES
Lance Sloan  : 1985  Payor: Toni Desir / [address]: [id number] / Product Type: Managed Care Medicaid /  [address] [place] [address] at [address] [place] [address]. [facility], [address] [place] [address], [address] [place] [address]  [phone]   [phone]                                                          Hamilton Jean MD      OUTPATIENT PHYSICAL THERAPY: Daily Treatment Note 3/7/2022 Visit Count:  7    Tx Diagnosis:  Low back pain (M54.5)  Muscle Weakness, Generalized (M62.81)      Pre-treatment Symptoms/Complaints: Patient wants to know how to transfer her ill father bed to chair without hurting her back. Pain: Initial: did not rate  Medications Last Reviewed:  3/7/2022     Post Session: did not rate   Updated Objective Findings: full spinal motion no pain complaints today. TREATMENT:   THERAPEUTIC EXERCISE: (15 minutes):  Exercises per grid below to improve mobility, strength and balance. Required minimal visual, verbal and manual cues to promote proper body alignment and promote proper body posture. Progressed resistance and complexity of movement as indicated.      Date:  2022 Date:  22 Date:  22 Date  22 Date  22 Date 3/3/22 3/7/22   Activity/Exercise Parameters Parameters Parameters       Education HEP, POC, PT goals, anatomy/pathology, pain science ed cup analogy, walking 20 min goal         Treadmill  2.5 mph 8 min  5 min  6 min  6 min  6 min  8 min    Overhead reach with pole warmup  20 x 20 Band pull aparts 10 x  Band pull aparts 10 x red Banded rows    Cat camel   20 x  20 10 x      Hip hinge quadruped  20 x  20  10 x      Bird dog           Squat   3 x 10 5#  Circuit 4 x 10 10#  15#  3 x 10  25#10 x circuit  4 rounds     Overhead press   Bar 10 x  10# 10 x,5 x 2  CIrcuit DB 5 # 10 x 4 rounds 6# DB press 3 x 10  8# 3 x 10 circuit 4 rounds     Hip hinge pole  3 x 10  10 x       Resisted lateral walk  60' med resistance band 60' medium circuit 4 rounds 61' heavy band  60# band 60'   Monster walk      60' heavy band      RDL    10 # 4 x 10  Circuit 4 rounds 15# 3 x 10 circuit  20#  5 x 4 rounds     Cool down child pose to cobra, knees to chest, lower trunk rotation   10 x ea   10 x ea     Bent over rows     2 x 10 6#    2 x 10 5#   Bent over shoulder extension    2 x 10 3#      Cable walk backs     23# 6 x      Lat pull        40# 3 x 10         THERAPEUTIC ACTIVITY: 25 minutes): Activities per gid below to improve functional movement related mobility, strength and balance to improve neuro-muscular carryover to daily functional activities for improving patient's quality of life. Required visual, verbal and manual cues to promote proper body alignment and promote proper body posture/mechanics. Progressed resistance and complexity of movement as indicated. Date:  2/24/2022 Date:  3/3/22 Date:  3/7/22   Activity/Exercise Parameters Parameters Parameters   Almeta Lat carry  20# 100' circuit 4 rounds        Sled pull   120' 70#         DB press   8# 3 x 10        Back squat    20# 2 x 5   30# 5 x   45# 3 x 5  45# 5 x   55# 3 x 5       deadlift    45# 3 x 5  45# 10 x    Transfer training sit to stand, supine to sit, floor to stand    15 min use of belt                               HEP Log Date 1. Press, squat, hip hinge, lateral walk 2/14//2022   2. RDL  2/17/2022   3. 3/7/2022   4.    5.           [employer] Portal  Treatment/Session Summary:    Response to Treatment: No pain complaints   Communication/Consultation:  Transfer training, barbell lifting   Equipment provided today: none   Recommendations/Intent for next treatment session:   Next visit will focus on introducing barbell lifts          Treatment Plan of Care Effective Dates: 3/7/2022 TO 4/12/2022 (60 days).   Frequency/Duration: 2 times a week for 60 Days             Total Treatment Billable Duration: 40 min    PT Patient Time In/Time Out  Time In: 0810  Time Out: 0900  Everardo Banegas Dorothy, PT    Future Appointments   Date Time Provider Lucius Zayas   3/10/2022  9:30 AM Dexter De La Cruz, PT [facility]   3/14/2022  8:45 AM Dexter De La Cruz, PT [facility]

## 2022-03-10 ENCOUNTER — HOSPITAL ENCOUNTER (OUTPATIENT)
Dept: PHYSICAL THERAPY | Age: 37
Discharge: HOME | End: 2022-03-10
Payer: MEDICAID

## 2022-03-10 PROCEDURE — 97530 THERAPEUTIC ACTIVITIES: CPT

## 2022-03-10 PROCEDURE — 97110 THERAPEUTIC EXERCISES: CPT

## 2022-03-10 NOTE — PROGRESS NOTES
Dipti Seymour  : 1985  Payor: Tommy Conway / Plan: [id number] / Product Type: Managed Care Medicaid /  [address] [facility] at [address] [place] [address]. [facility], [address] [place] [address], [address] [place] [address]  [phone]   [phone]                                                          Hamilton Jean MD      OUTPATIENT PHYSICAL THERAPY: Daily Treatment Note 3/10/2022 Visit Count:  8    Tx Diagnosis:  Low back pain (M54.5)  Muscle Weakness, Generalized (M62.81)      Pre-treatment Symptoms/Complaints: Patient wants to know how to transfer her ill father bed to chair without hurting her back. Pain: Initial: did not rate  Medications Last Reviewed:  3/10/2022     Post Session: did not rate   Updated Objective Findings: full spinal motion no pain complaints today. TREATMENT:   THERAPEUTIC EXERCISE: (23 minutes):  Exercises per grid below to improve mobility, strength and balance. Required minimal visual, verbal and manual cues to promote proper body alignment and promote proper body posture. Progressed resistance and complexity of movement as indicated.      Date:  2022 Date:  22 Date:  22 Date  22 Date  22 Date 3/3/22 3/7/22 3/10/22   Activity/Exercise Parameters Parameters Parameters        Education HEP, POC, PT goals, anatomy/pathology, pain science ed cup analogy, walking 20 min goal          Treadmill  2.5 mph 8 min  5 min  6 min  6 min  6 min  8 min  8 min   Overhead reach with pole warmup  20 x 20 Band pull aparts 10 x  Band pull aparts 10 x red Banded rows     Cat camel   20 x  20 10 x       Hip hinge quadruped  20 x  20  10 x       Bird dog            Squat   3 x 10 5#  Circuit 4 x 10 10#  15#  3 x 10  25#10 x circuit  4 rounds      Overhead press   Bar 10 x  10# 10 x,5 x 2  CIrcuit DB 5 # 10 x 4 rounds 6# DB press 3 x 10  8# 3 x 10 circuit 4 rounds      Hip hinge pole  3 x 10  10 x    10 x 10 x   Resisted lateral walk  60' med resistance band 60' medium circuit 4 rounds  60' heavy band  60# band 60'    Monster walk      60' heavy band       RDL    10 # 4 x 10  Circuit 4 rounds 15# 3 x 10 circuit  20#  5 x 4 rounds      Cool down child pose to cobra, knees to chest, lower trunk rotation   10 x ea   10 x ea      Bent over rows     2 x 10 6#    2 x 10 5#    Bent over shoulder extension    2 x 10 3#       Cable walk backs     23# 6 x       Lat pull        40# 3 x 10 43# 3 x 10   Hip thrusters        3 x 3 20#         THERAPEUTIC ACTIVITY:30 minutes): Activities per gid below to improve functional movement related mobility, strength and balance to improve neuro-muscular carryover to daily functional activities for improving patient's quality of life. Required visual, verbal and manual cues to promote proper body alignment and promote proper body posture/mechanics. Progressed resistance and complexity of movement as indicated. Date:  2/24/2022 Date:  3/3/22 Date:  3/7/22 Date   3/10/22   Activity/Exercise Parameters Parameters Parameters    Ramya Belt carry  20# 100' circuit 4 rounds          Sled pull   120' 70#           DB press   8# 3 x 10          Back squat    20# 2 x 5   30# 5 x   45# 3 x 5  45# 5 x   55# 3 x 5     45# 2 x 5   55# 3 x 5   60# 1 x 5    deadlift    45# 3 x 5  45# 10 x  45# 10 x   3 x 5 65#    Transfer training sit to stand, supine to sit, floor to stand    15 min use of belt                                   HEP Log Date 1. Press, squat, hip hinge, lateral walk 2/14//2022   2.  RDL  2/17/2022   3. 3/10/2022   4.    5.           [employer] Portal  Treatment/Session Summary:    Response to Treatment: No pain complaints   Communication/Consultation:  Transfer training, barbell lifting   Equipment provided today: none   Recommendations/Intent for next treatment session:   Next visit will focus on introducing barbell lifts          Treatment Plan of Care Effective Dates: 3/10/2022 TO 4/12/2022 (60 days).   Frequency/Duration: 2 times a week for 60 Days             Total Treatment Billable Duration: 53 min    PT Patient Time In/Time Out  Time In: 0935  Time Out: [facility], PT    Future Appointments   Date Time Provider Lucius Zayas   3/14/2022  8:45 AM Maribell De La Cruz, PT [facility]

## 2022-03-14 ENCOUNTER — HOSPITAL ENCOUNTER (OUTPATIENT)
Dept: PHYSICAL THERAPY | Age: 37
Discharge: HOME | End: 2022-03-14
Payer: MEDICAID

## 2022-03-14 PROCEDURE — 97110 THERAPEUTIC EXERCISES: CPT

## 2022-03-14 PROCEDURE — 97530 THERAPEUTIC ACTIVITIES: CPT

## 2022-03-14 NOTE — PROGRESS NOTES
Kevin Carolynn  : 1985  Payor: Nataliya Boateng / Plan: [id number] / Product Type: Managed Care Medicaid /  [address] [facility] at [address] [place] [address]. [facility], [address], [id number] [place] [address] Road  [phone]   [phone]                                                          Hamilton Jean MD      OUTPATIENT PHYSICAL THERAPY: Daily Treatment Note 3/14/2022 Visit Count:  9    Tx Diagnosis:  Low back pain (M54.5)  Muscle Weakness, Generalized (M62.81)      Pre-treatment Symptoms/Complaints: EMEKA 0   Pain: Initial: did not rate  Medications Last Reviewed:  3/14/2022     Post Session: did not rate   Updated Objective Findings: See DC note        TREATMENT:   THERAPEUTIC EXERCISE: (23 minutes):  Exercises per grid below to improve mobility, strength and balance. Required minimal visual, verbal and manual cues to promote proper body alignment and promote proper body posture. Progressed resistance and complexity of movement as indicated.      Date:  2022 Date:  22 Date:  22 Date  22 Date  22 Date 3/3/22 3/7/22 3/10/22 3/14/22   Activity/Exercise Parameters Parameters Parameters         Education HEP, POC, PT goals, anatomy/pathology, pain science ed cup analogy, walking 20 min goal        DC plan at the [facility] follow 3x5 strength reggie    Treadmill  2.5 mph 8 min  5 min  6 min  6 min  6 min  8 min  8 min 5 min    Overhead reach with pole warmup  20 x 20 Band pull aparts 10 x  Band pull aparts 10 x red Banded rows      Cat camel   20 x  20 10 x        Hip hinge quadruped  20 x  20  10 x        Bird dog             Squat   3 x 10 5#  Circuit 4 x 10 10#  15#  3 x 10  25#10 x circuit  4 rounds       Overhead press   Bar 10 x  10# 10 x,5 x 2  CIrcuit DB 5 # 10 x 4 rounds 6# DB press 3 x 10  8# 3 x 10 circuit 4 rounds       Hip hinge pole  3 x 10  10 x    10 x 10 x 10 x   Resisted lateral walk  60' med resistance band 60' medium circuit 4 rounds  60' heavy band 60# band 60'     Monster walk      60' heavy band        RDL    10 # 4 x 10  Circuit 4 rounds 15# 3 x 10 circuit  20#  5 x 4 rounds       Cool down child pose to cobra, knees to chest, lower trunk rotation   10 x ea   10 x ea       Bent over rows     2 x 10 6#    2 x 10 5#     Bent over shoulder extension    2 x 10 3#        Cable walk backs     23# 6 x        Lat pull        40# 3 x 10 43# 3 x 10    Hip thrusters        3 x 3 20#          THERAPEUTIC ACTIVITY:30 minutes): Activities per gid below to improve functional movement related mobility, strength and balance to improve neuro-muscular carryover to daily functional activities for improving patient's quality of life. Required visual, verbal and manual cues to promote proper body alignment and promote proper body posture/mechanics. Progressed resistance and complexity of movement as indicated. Date:  2/24/2022 Date:  3/3/22 Date:  3/7/22 Date   3/10/22 Date  3/14/22   Activity/Exercise Parameters Parameters Parameters     Tg Loreta carry  20# 100' circuit 4 rounds            Sled pull   120' 70#             DB press   8# 3 x 10            Back squat    20# 2 x 5   30# 5 x   45# 3 x 5  45# 5 x   55# 3 x 5     45# 2 x 5   55# 3 x 5   60# 1 x 5  45# 5 x   60# 3 x   65# 3 x 5    deadlift    45# 3 x 5  45# 10 x  45# 10 x   3 x 5 65#      Transfer training sit to stand, supine to sit, floor to stand    15 min use of belt           Overhead press          20# 5 x   30# 2 x 5 indepth instruction    Bench press           45# 5x    In depth instruction                  HEP Log Date 1. Press, squat, hip hinge, lateral walk 2/14//2022   2.  RDL  2/17/2022   3. 3/14/2022   4.    5.           [employer] Portal  Treatment/Session Summary:    Response to Treatment: No pain complaints all goals met   Communication/Consultation:  [place]  3 x 5 program   Equipment provided today: none   Recommendations/Intent for next treatment session:   BERONICA Louis [address] of Care Effective Dates: 3/14/2022 TO 4/12/2022 (60 days). Frequency/Duration: 2 times a week for 60 Days             Total Treatment Billable Duration: 53 min    PT Patient Time In/Time Out  Time In: 0845  Time Out: 0930  Tiffanie De La Cruz, PT    No future appointments.

## 2022-03-14 NOTE — THERAPY DISCHARGE
lia Dooley  : 1985      Payor: Sade Hinkle / Plan: Benson Bernal / Product Type: Managed Care Medicaid /  Sharol Koyanagi at [address] [place] [address]. [address] [place] [address] [address] [place] [address], [facility], [address] [place] [address]  [phone]   [phone]       OUTPATIENT PHYSICAL THERAPY:Discharge 3/14/2022      ICD-10: Treatment Diagnosis:   Low back pain (M54.5)                 Precautions/Allergies:   Patient has no known allergies. Fall Risk Score: 0 (? 5 = High Risk)  MD Orders: Eval and Treat  MEDICAL/REFERRING DIAGNOSIS:  Acute low back pain without sciatica   DATE OF ONSET: chronic over a year  REFERRING PHYSICIAN: [address] [place], [place] [address]DAVID MD  RETURN PHYSICIAN APPOINTMENT: CARLOS     FINAL ASSESSMENT:  Ms. Meek Dooley as achieved all goals and has been discharged to the [facility]. TREATMENT PLAN:  Effective Dates: 2022 TO 2022 (60 days). Frequency/Duration: 2 times a week for 60 Days  GOALS: (Goals have been discussed and agreed upon with patient.)     Short-Term Goals~4 weeks  Goal Met   1. Meek Dooley will be independent with HEP 1. [x] Date: 3/14/22   2. Meek Dooley will improve EMEKA score by 8 points 2. [x] Date:   3. Meek Dooley will participate in core stabilization exercises to help with stabilization during ADLs 3. [x] Date:   4. Meek Dooley will tolerate walking outside for 20 minutes. 4.  [x] Date:   5. Meek Dooley will be able to lift and carry 25 pounds without back pain. 5.  [x] Date:    Long Term Goals~8 weeks Goal Met   1. Meek Dooley will demonstrate a 15 point improvement on the Oswestry to show improvement in function 1. [x] Date: 3/14/22   2. Meek Dooley will be able to perform household chores with minimal back pain. 2.  [x] Date:   3. Meek Dooley will be able to lift 50 pounds from the dead lift postion without back pain. 3.  [x] Date:   4. Meek Dooley will be able to carry 30 pounds 140' without back pain.    4.  [x] Date: Outcome Measure: Tool Used: Modified Oswestry Low Back Pain Questionnaire    Score:  Initial: 19/50  Most Recent: 0/50 (Date: 3/14/22 )   Interpretation of Score: Each section is scored on a 0-5 scale, 5 representing the greatest disability. The scores of each section are added together for a total score of 50. Total Treatment Duration:  PT Patient Time In/Time Out  Time In: 0845  Time Out: 0930    Thank you for this referral,  Keshav Vickers PT     Referring Physician Signature: Maria De Jesus Gaxiola MD No Signature is Required for this note. HISTORY:   History of Present Injury/Illness (Reason for Referal:   Chronic back pain for 4 years. A year ago had her son and the pain has gotten worse. Her son now weighs 25 lbs. Sates that her pain feels like a needle. -Present symptoms/complaints (on day of discharge)  Pain Scale:  · Current: 0/10  · Best: 0/10 rarely  · Worst:0/10      · Aggravating factors: patient no longer reports pain   · Relieving factors: Exercise  · Irritability: Low (Onset of pain is is longer than the time it takes for Pain to go away)      Past Medical History/Comorbidities:   Ms. Rod Dalton  has a past medical history of Anemia and Breast disorder.     She has no past medical history of Abnormal Papanicolaou smear of cervix, Asthma, Chlamydia, Complication of anesthesia, Diabetes ([address] Disease of blood and blood forming organ, DVT (deep venous thrombosis) ([address] Eclampsia, Ectopic pregnancy, Epilepsy ([address] Essential hypertension, Genital herpes, Gestational diabetes, Gestational hypertension, Gonorrhea, Heart abnormality, Herpes gestationis, Herpes simplex virus (HSV) infection, Human immunodeficiency virus (HIV) disease ([address] Hypertension, Hyperthyroidism, Hypothyroidism, Infertility, female, Kidney disease, Liver disease, Molar pregnancy, Nicotine vapor product user, Non-nicotine vapor product user, Other unknown and unspecified cause of morbidity or mortality, Phlebitis and thrombophlebitis, Pituitary disorder ([facility] Polycystic disease, ovaries, Postpartum depression, Preeclampsia,  delivery, Psychiatric problem, Rh negative state in antepartum period, Rhesus isoimmunization affecting pregnancy, Sickle cell disease ([facility] Sickle cell trait syndrome ([facility] Syphilis, Systemic lupus erythematosus ([facility] Thyroid activity decreased, Trauma, Type I diabetes mellitus ([facility] or  (vaginal birth after ). Ms. Mika Daniels  has a past surgical history that includes hx wisdom teeth extraction; hx refractive surgery; and hx dilation and curettage.   Social History/Living Environment:     two children    Social History     Socioeconomic History    Marital status:      Spouse name: Not on file    Number of children: Not on file    Years of education: Not on file    Highest education level: Not on file   Occupational History    Not on file   Tobacco Use    Smoking status: Former Smoker     Packs/day: 0.25     Years: 2.00     Pack years: 0.50     Quit date: 2/10/2017     Years since quittin.0    Smokeless tobacco: Never Used   Vaping Use    Vaping Use: Never used   Substance and Sexual Activity    Alcohol use: No    Drug use: No    Sexual activity: Yes     Partners: Male     Birth control/protection: Inserts   Other Topics Concern     Service Not Asked    Blood Transfusions Not Asked    Caffeine Concern Not Asked    Occupational Exposure Not Asked    Hobby Hazards Not Asked    Sleep Concern Not Asked    Stress Concern Not Asked    Weight Concern Not Asked    Special Diet Not Asked    Back Care Not Asked    Exercise Not Asked    Bike Helmet Not Asked    [address] Not Asked    Self-Exams Not Asked   Social History Narrative    Not on file     Social Determinants of Health     Financial Resource Strain:     Difficulty of Paying Living Expenses: Not on file   Food Insecurity:     Worried About Running Out of Food in the Last Year: Not on file    Ran Out of Food in the Last Year: Not on file   Transportation Needs:     Lack of Transportation (Medical): Not on file    Lack of Transportation (Non-Medical):  Not on file   Physical Activity:     Days of Exercise per Week: Not on file    Minutes of Exercise per Session: Not on file   Stress:     Feeling of Stress : Not on file   Social Connections:     Frequency of Communication with Friends and Family: Not on file    Frequency of Social Gatherings with Friends and Family: Not on file    Attends Restorationism Services: Not on file    Active Member of [address] [place] [address] [employer] or Organizations: Not on file    Attends Club or Organization Meetings: Not on file    Marital Status: Not on file   Intimate Partner Violence:     Fear of Current or Ex-Partner: Not on file    Emotionally Abused: Not on file    Physically Abused: Not on file    Sexually Abused: Not on file   Housing Stability:     Unable to Pay for Housing in the Last Year: Not on file    Number of [place] in the Last Year: Not on file    Unstable Housing in the Last Year: Not on file     Prior Level of Function/Work/Activity:  Normal  Previous Treatment Approach  self treat    Active Ambulatory Problems     Diagnosis Date Noted    Encounter for supervision of low-risk pregnancy, antepartum 10/12/2020    GERD (gastroesophageal reflux disease) 10/12/2020    Other hemorrhoids 10/12/2020     Resolved Ambulatory Problems     Diagnosis Date Noted    Supervision of low-risk first pregnancy 08/29/2017    Amniotic fluid leaking 10/08/2017    False labor after 37 weeks of gestation without delivery 10/15/2017    Normal labor 10/16/2017    Constipation 10/12/2020    Low back pain during pregnancy, third trimester 11/28/2020    Vaginal discharge in pregnancy in third trimester 01/08/2021     Past Medical History:   Diagnosis Date    Anemia     Breast disorder      Note: Patient denies any increase of symptoms with cough, sneeze or valsalva. Patient denies any saddle paresthesia or bowel/bladder deficits. Current Medications:    Current Outpatient Medications:     PNV#16-Iron Fum & PS-FA-OM-3 35-1-200 mg cap, Take  by mouth., Disp: , Rfl:     ethinyl estradiol-etonogestrel (NUVARING) 0.12-0.015 mg/24 hr vaginal ring, Insert one ring into vagina, leave for 3 weeks. Insert new ring one week after removal, Disp: 3 Ring, Rfl: 4         Date Last Reviewed:  3/14/2022   EXAMINATION:   Observation/Orthostatic Postural Assessment:        normal  Palpation:          Increased Tenderness to grade 3 PAs T 11-12    ROM:            AROM/PROM       RIGHT LEFT   Knee Extension  WNL  WNL   Knee Flexion  WNL  WNL   Hip Flexion  WNL  WNL   Hip Abduction  WNL  WNL     Lumbar ROM     Movement Range Descriptor Degrees Notes   Flexion Hands to Foot Unremarkable     Extension Spine of Scapula past heel Unremarkable         Repeated Motion:  Direction    Frequency Symptoms Prior Symptons Post   Flexion Repeated 10 times  Unremarkable   Extension Sustained Standing  Unremarkable         Strength:      RIGHT LEFT   Knee Flexion (L5-S2)  5/5  5/5   Knee Extension (L3, L4)  5/5  5/5   Hip Flexion (L1, L2)  4+/5  4+/5   Hip Extension  4+/5  4+/5   Hip Abduction (L5, S1)  4+/5  4+/5   Ankle Dorsiflexion (L4)  5/5  5/5   Great Toe Extension (L5)  5/5  5/5   Ankle Plantar Flexion (S1-S2)  5/5  5/5       Special Tests:  Lumbar:  SLR: Negative  SLUMP: Negative   SI Joint:  Not Tested   Hip:  normal         Neurological Screen:    RADIATING SYMPTOMS: No      Functional Mobility:  Affecting participation in basic ADLs and functional tasks. Balance and Mobility:    Test Result   Timed up and Go  Seconds   30 second Sit to Stand 15   6 Minute Walk Test    Single Leg Balance Right: <1 minute     Left:<1 minute          Body Structures Involved:    1. Muscles   Body Functions Affected:    1. Neuromusculoskeletal  2.  Movement Related Activities and Participation Affected:  1. Mobility  2. Material handling     See associated treatment note for treatment provided today      No future appointments.       Alena Habermann, PT     remember to save as eval note

## 2022-03-18 PROBLEM — Z34.90 ENCOUNTER FOR SUPERVISION OF LOW-RISK PREGNANCY, ANTEPARTUM: Status: ACTIVE | Noted: 2020-10-12

## 2022-03-19 PROBLEM — K64.8 OTHER HEMORRHOIDS: Status: ACTIVE | Noted: 2020-10-12

## 2022-03-20 PROBLEM — K21.9 GERD (GASTROESOPHAGEAL REFLUX DISEASE): Status: ACTIVE | Noted: 2020-10-12

## 2022-12-20 ENCOUNTER — TELEPHONE (OUTPATIENT)
Dept: OBGYN CLINIC | Age: 37
End: 2022-12-20

## 2022-12-20 NOTE — TELEPHONE ENCOUNTER
Pt LM on nurse line with c/o cough, headache and congestion. Pt is 7w5d pregnant per LMP of 10/27/22. Pt states she was seen at Urgent Care 1 week ago on 12/14/22 for sore throat, Covid and flu test negative. Pt states she starting feeling better and then on 12/17/22 her s/sx worsened. Pt encouraged to return to PCP/Urgent Care. OTC safe medications reviewed. Instructed pt to increase hydration. Pt agree's and voiced understanding.

## 2022-12-27 ENCOUNTER — NURSE ONLY (OUTPATIENT)
Dept: OBGYN CLINIC | Age: 37
End: 2022-12-27
Payer: MEDICAID

## 2022-12-27 VITALS — BODY MASS INDEX: 24.32 KG/M2 | WEIGHT: 146 LBS | HEIGHT: 65 IN

## 2022-12-27 DIAGNOSIS — O09.521 ADVANCED MATERNAL AGE IN MULTIGRAVIDA, FIRST TRIMESTER: Primary | ICD-10-CM

## 2022-12-27 DIAGNOSIS — N92.6 MISSED MENSES: ICD-10-CM

## 2022-12-27 DIAGNOSIS — O09.521 ADVANCED MATERNAL AGE IN MULTIGRAVIDA, FIRST TRIMESTER: ICD-10-CM

## 2022-12-27 LAB
ABO + RH BLD: NORMAL
BASOPHILS # BLD: 0 K/UL (ref 0–0.2)
BASOPHILS NFR BLD: 1 % (ref 0–2)
BLOOD GROUP ANTIBODIES SERPL: NORMAL
DIFFERENTIAL METHOD BLD: NORMAL
EOSINOPHIL # BLD: 0.1 K/UL (ref 0–0.8)
EOSINOPHIL NFR BLD: 1 % (ref 0.5–7.8)
ERYTHROCYTE [DISTWIDTH] IN BLOOD BY AUTOMATED COUNT: 12.2 % (ref 11.9–14.6)
HBV SURFACE AG SER QL: NONREACTIVE
HCG, PREGNANCY, URINE, POC: POSITIVE
HCT VFR BLD AUTO: 40.8 % (ref 35.8–46.3)
HCV AB SER QL: NONREACTIVE
HGB BLD-MCNC: 13.4 G/DL (ref 11.7–15.4)
HIV 1+2 AB+HIV1 P24 AG SERPL QL IA: NONREACTIVE
HIV 1/2 RESULT COMMENT: NORMAL
IMM GRANULOCYTES # BLD AUTO: 0.1 K/UL (ref 0–0.5)
IMM GRANULOCYTES NFR BLD AUTO: 1 % (ref 0–5)
LYMPHOCYTES # BLD: 2.2 K/UL (ref 0.5–4.6)
LYMPHOCYTES NFR BLD: 36 % (ref 13–44)
MCH RBC QN AUTO: 29.1 PG (ref 26.1–32.9)
MCHC RBC AUTO-ENTMCNC: 32.8 G/DL (ref 31.4–35)
MCV RBC AUTO: 88.7 FL (ref 82–102)
MONOCYTES # BLD: 0.5 K/UL (ref 0.1–1.3)
MONOCYTES NFR BLD: 7 % (ref 4–12)
NEUTS SEG # BLD: 3.3 K/UL (ref 1.7–8.2)
NEUTS SEG NFR BLD: 54 % (ref 43–78)
NRBC # BLD: 0 K/UL (ref 0–0.2)
PLATELET # BLD AUTO: 265 K/UL (ref 150–450)
PMV BLD AUTO: 10.8 FL (ref 9.4–12.3)
RBC # BLD AUTO: 4.6 M/UL (ref 4.05–5.2)
RUBV IGG SERPL IA-ACNC: 232.9 IU/ML (ref 0–50)
VALID INTERNAL CONTROL, POC: YES
WBC # BLD AUTO: 6.2 K/UL (ref 4.3–11.1)

## 2022-12-27 PROCEDURE — 81025 URINE PREGNANCY TEST: CPT | Performed by: OBSTETRICS & GYNECOLOGY

## 2022-12-27 NOTE — PROGRESS NOTES
NOB consult with pt and FOB. UPT-Positive. Labs today: PNL. Offered pt the option of CF, SMA, and Fragile X carrier testing. Pt desires testing. Offered pt the option of genetic screening (1st screen vs Tetra vs NIPT). Pt desires NIPT. Instructed pt on exercise/nutrition in pregnancy. Reviewed [facility] preg book. Advised pt on using [facility] for hospital needs and [facility] L&D for pregnancy related emergencies. Pt states understanding. NOB forms signed, scanned, and given to pt. Medical Hx: Anemia. Breast disorder (right breast lump-benign)     Surgical Hx: D&C. Refractive Surgery. [place] tooth extraction. Last Pap: 21 WNL. Pt notified pap smear and std screening will be done at [facility]. Pt OB c/o: none    Fam hx any chromosomal or inheritable disorders: none     COVID Vaccine: x3 per pt    Flu Vaccine: declines at this time    OB hx: G1-2017:  @ 39w6d, 7lb1.1oz   G2-2019: SAB, D&C   G3-:  @ 39w1d, 8lb 7.1oz   No hx GHTN/GDM. Pt denies any other pregnancy/post partum complications    [facility]: EKTA on 23 with [facility] Alex. NIPT and Carrier Screen to be collected at [facility] if >10 weeks.

## 2022-12-28 LAB — RPR SER QL: NONREACTIVE

## 2022-12-30 LAB
BACTERIA SPEC CULT: NORMAL
HGB A MFR BLD: 97.1 % (ref 96.4–98.8)
HGB A2 MFR BLD COLUMN CHROM: 2.9 % (ref 1.8–3.2)
HGB F MFR BLD: 0 % (ref 0–2)
HGB FRACT BLD-IMP: NORMAL
HGB S MFR BLD: 0 %
SERVICE CMNT-IMP: NORMAL

## 2023-01-09 ENCOUNTER — ROUTINE PRENATAL (OUTPATIENT)
Dept: OBGYN CLINIC | Age: 38
End: 2023-01-09
Payer: MEDICAID

## 2023-01-09 VITALS — WEIGHT: 150 LBS | BODY MASS INDEX: 24.96 KG/M2 | DIASTOLIC BLOOD PRESSURE: 74 MMHG | SYSTOLIC BLOOD PRESSURE: 114 MMHG

## 2023-01-09 DIAGNOSIS — O44.01 PLACENTA PREVIA IN FIRST TRIMESTER: ICD-10-CM

## 2023-01-09 DIAGNOSIS — Z34.90 ENCOUNTER FOR SUPERVISION OF LOW-RISK PREGNANCY, ANTEPARTUM: ICD-10-CM

## 2023-01-09 DIAGNOSIS — Z36.3 ANTENATAL SCREENING FOR MALFORMATION USING ULTRASONICS: Primary | ICD-10-CM

## 2023-01-09 PROCEDURE — 99213 OFFICE O/P EST LOW 20 MIN: CPT | Performed by: OBSTETRICS & GYNECOLOGY

## 2023-01-09 PROCEDURE — 76801 OB US < 14 WKS SINGLE FETUS: CPT | Performed by: OBSTETRICS & GYNECOLOGY

## 2023-01-09 RX ORDER — ASPIRIN 81 MG/1
162 TABLET ORAL DAILY
Qty: 60 TABLET | Refills: 5 | Status: SHIPPED | OUTPATIENT
Start: 2023-01-09

## 2023-01-09 NOTE — PROGRESS NOTES
CC: New OB exam    HPI:  30 y.o.  [id number] presents today for a New OB examination at 10w4d  by  d= US today. Pt also with complaints of none. No n/v--tolerating PO  No cramping/VB    Genetics: Desires NIPT      OB HISTORY:   OB History          4    Para   2    Term   2            AB   1    Living   2         SAB   1    IAB        Ectopic        Molar        Multiple        Live Births   2                Family history of obstetric issues, genetic abnormalities:  no       GYN HISTORY:  Last Pap:  2021 neg   Hx of Abnl Paps: no  History of STDs:  no      No flowsheet data found. No flowsheet data found. Past Medical History:   Diagnosis Date    Anemia     Breast disorder     right breast lump-benign         Past Surgical History:   Procedure Laterality Date    DILATION AND CURETTAGE OF UTERUS      for MAB around 9wks     REFRACTIVE SURGERY      WISDOM TOOTH EXTRACTION           Outpatient Encounter Medications as of 2023   Medication Sig Dispense Refill    Prenatal MV & Min w/FA-DHA (ONE A DAY PRENATAL PO) Take by mouth       No facility-administered encounter medications on file as of 2023. No Known Allergies      Family History   Problem Relation Age of Onset    Hypertension Father     Kidney Disease Father     Diabetes Neg Hx     Colon Cancer Maternal Aunt     Breast Cancer Mother          Social History     Socioeconomic History    Marital status:    Tobacco Use    Smoking status: Former     Packs/day: 0.25     Types: Cigarettes     Quit date: 2/10/2017     Years since quittin.9    Smokeless tobacco: Never   Vaping Use    Vaping Use: Former   Substance and Sexual Activity    Alcohol use: No    Drug use: No    Sexual activity: Yes     Partners: Male     Birth control/protection: Inserts           ROS:  Review of Systems   Constitutional: Negative for chills, fever and weight loss. HENT: Negative for hearing loss.    Eyes: Negative for blurred vision and double vision. Respiratory: Negative for cough, hemoptysis and shortness of breath. Cardiovascular: Negative for chest pain, palpitations and orthopnea. Gastrointestinal: Negative for abdominal pain, blood in stool, constipation, diarrhea, nausea and vomiting. Genitourinary: Negative for dysuria, frequency, hematuria and urgency. Musculoskeletal: Negative for falls, joint pain and myalgias. Skin: Negative for itching and rash. Neurological: Negative for headaches. Endo/Heme/Allergies: Does not bruise/bleed easily. Psychiatric/Behavioral: Negative for depression and suicidal ideas. The patient is not nervous/anxious. All other systems reviewed and are negative. PHYSICAL EXAM:  Last menstrual period 10/27/2022. Constitutional: She appears well-developed and well-nourished. No distress. HENT:    Head: Normocephalic and atraumatic. Cardiovascular: Normal rate, regular rhythm and normal heart sounds. Exam reveals no gallop and no friction rub. No murmur heard. Pulmonary/Chest: Effort normal and breath sounds normal. No respiratory distress. She has no wheezes. She has no rales. Abdominal: Soft. Bowel sounds are normal. There is no tenderness. There is no rebound and no guarding. Gravid. Skin: She is not diaphoretic. Psychiatric: She has a normal mood and affect. Her behavior is normal. Thought content normal. .    Breasts:   Symmetric, no lesions, masses, rashes, no abnl nipple Dc         ASSESSMENT/PLAN:   30 y.o., [id number], for New OB exam at 10w4d :     1) New OB:   -Pap up to date    -PN labs  wnl   -Rx ASA    -Flu vaccine recommended    -NIPT today    -RTO 4wks for REYMUNDO    - Early previa on US today.  Recommend pelvic rest [address], DO

## 2023-02-06 ENCOUNTER — ROUTINE PRENATAL (OUTPATIENT)
Dept: OBGYN CLINIC | Age: 38
End: 2023-02-06
Payer: MEDICAID

## 2023-02-06 VITALS — BODY MASS INDEX: 25.29 KG/M2 | SYSTOLIC BLOOD PRESSURE: 120 MMHG | WEIGHT: 152 LBS | DIASTOLIC BLOOD PRESSURE: 80 MMHG

## 2023-02-06 DIAGNOSIS — O99.342 ANXIETY IN PREGNANCY IN SECOND TRIMESTER, ANTEPARTUM: ICD-10-CM

## 2023-02-06 DIAGNOSIS — F41.9 ANXIETY IN PREGNANCY IN SECOND TRIMESTER, ANTEPARTUM: ICD-10-CM

## 2023-02-06 DIAGNOSIS — O09.522 AMA (ADVANCED MATERNAL AGE) MULTIGRAVIDA 35+, SECOND TRIMESTER: Primary | ICD-10-CM

## 2023-02-06 PROCEDURE — 99213 OFFICE O/P EST LOW 20 MIN: CPT | Performed by: OBSTETRICS & GYNECOLOGY

## 2023-02-06 NOTE — PROGRESS NOTES
31yo [id number] @ 14w4d for REYMUNDO:    No OB complaints. Had pp depression previously, denies depression today but has been having increased anxiety. Declines medication but would like to talk about options. Vitals:    02/06/23 1015   BP: 120/80     Weight Metrics 2/6/2023 1/9/2023 12/27/2022 11/2/2021 4/9/2021 3/22/2021 2/23/2021   Weight 152 lb 150 lb 146 lb 155 lb 173 lb 179 lb 183 lb   BMI (Calculated) 0 kg/m2 0 kg/m2 24.3 kg/m2 26.7 kg/m2 29.8 kg/m2 30.8 kg/m2 31.5 kg/m2     FHTs 140s    1. AMA (advanced maternal age) multigravida 33+, second trimester    2. Anxiety in pregnancy in second trimester, antepartum      Routine counseling reviewed. Discussed her anxiety, sheet given regarding [facility]'s virtual therapy option. All options reviewed. PNL (reviewed each in lab tab today to confirm). Last pap 2021. Pt has not had GC/CT performed yet, will see if can run on urine. Did not have one performed at [facility] exam on 1/9/2023. RTC 5 wks for Anatomy US & REYMUNDO    Greater than 20 minutes spent on same day of service in reviewing her prenatal record, problem list, MFM notes if applicable and in face to face counseling, education, and examining the patient regarding her exam findings, indications for further tests, and discussion of assessment and plan as stated above.

## 2023-03-13 SDOH — ECONOMIC STABILITY: INCOME INSECURITY: HOW HARD IS IT FOR YOU TO PAY FOR THE VERY BASICS LIKE FOOD, HOUSING, MEDICAL CARE, AND HEATING?: NOT HARD AT ALL

## 2023-03-13 SDOH — ECONOMIC STABILITY: FOOD INSECURITY: WITHIN THE PAST 12 MONTHS, YOU WORRIED THAT YOUR FOOD WOULD RUN OUT BEFORE YOU GOT MONEY TO BUY MORE.: NEVER TRUE

## 2023-03-13 SDOH — ECONOMIC STABILITY: HOUSING INSECURITY
IN THE LAST 12 MONTHS, WAS THERE A TIME WHEN YOU DID NOT HAVE A STEADY PLACE TO SLEEP OR SLEPT IN A SHELTER (INCLUDING NOW)?: NO

## 2023-03-13 SDOH — ECONOMIC STABILITY: FOOD INSECURITY: WITHIN THE PAST 12 MONTHS, THE FOOD YOU BOUGHT JUST DIDN'T LAST AND YOU DIDN'T HAVE MONEY TO GET MORE.: NEVER TRUE

## 2023-03-13 SDOH — ECONOMIC STABILITY: TRANSPORTATION INSECURITY
IN THE PAST 12 MONTHS, HAS LACK OF TRANSPORTATION KEPT YOU FROM MEETINGS, WORK, OR FROM GETTING THINGS NEEDED FOR DAILY LIVING?: NO

## 2023-03-16 ENCOUNTER — ROUTINE PRENATAL (OUTPATIENT)
Dept: OBGYN CLINIC | Age: 38
End: 2023-03-16

## 2023-03-16 VITALS — BODY MASS INDEX: 26.46 KG/M2 | DIASTOLIC BLOOD PRESSURE: 80 MMHG | SYSTOLIC BLOOD PRESSURE: 120 MMHG | WEIGHT: 159 LBS

## 2023-03-16 DIAGNOSIS — O09.522 AMA (ADVANCED MATERNAL AGE) MULTIGRAVIDA 35+, SECOND TRIMESTER: ICD-10-CM

## 2023-03-16 DIAGNOSIS — O44.01 PLACENTA PREVIA IN FIRST TRIMESTER: ICD-10-CM

## 2023-03-16 DIAGNOSIS — Z36.89 SCREENING, ANTENATAL, FOR FETAL ANATOMIC SURVEY: Primary | ICD-10-CM

## 2023-03-16 RX ORDER — CALCIUM CARBONATE 200(500)MG
1 TABLET,CHEWABLE ORAL DAILY
COMMUNITY

## 2023-03-16 NOTE — PROGRESS NOTES
31yo [id number] @ 20w0d for REYMUNDO:    No OB complaints. No complaints related to anxiety. +FM. Vitals:    23 1156   BP: 120/80     Weight Metrics 3/16/2023 2023 2023 2022 2021 2021 3/22/2021   Weight 159 lb 152 lb 150 lb 146 lb 155 lb 173 lb 179 lb   BMI (Calculated) 0 kg/m2 0 kg/m2 0 kg/m2 24.3 kg/m2 26.7 kg/m2 29.8 kg/m2 30.8 kg/m2     Anatomy US today:  Complete & WNL  Previa has resolved. CL 4.53  AC 65%HXG  Cephalic    1. Screening, , for fetal anatomic survey    2. AMA (advanced maternal age) multigravida 33+, second trimester    3. Placenta previa in first trimester      Routine counseling reviewed. Denies any complaints related to her anxiety as discussed last appt. PNL (reviewed each in lab tab today to confirm). Last pap . Pt has not had GC/CT performed yet, will see if can run on urine. Did not have one performed at [facility] exam on 2023 and urine was thrown out today prior to sending. Imaging reviewed - anatomy US complete and PREVIA HAS RESOLVED. RTC 4 wks for REYMUNDO with GC/CT via urine. Greater than 20 minutes spent on same day of service in reviewing her prenatal record, problem list, MFM notes if applicable and in face to face counseling, education, and examining the patient regarding her exam findings, indications for further tests, and discussion of assessment and plan as stated above.

## 2023-04-13 ENCOUNTER — ROUTINE PRENATAL (OUTPATIENT)
Dept: OBGYN CLINIC | Age: 38
End: 2023-04-13

## 2023-04-13 VITALS — DIASTOLIC BLOOD PRESSURE: 84 MMHG | SYSTOLIC BLOOD PRESSURE: 112 MMHG | WEIGHT: 166 LBS | BODY MASS INDEX: 27.62 KG/M2

## 2023-04-13 DIAGNOSIS — O44.01 PLACENTA PREVIA IN FIRST TRIMESTER: Primary | ICD-10-CM

## 2023-05-11 ENCOUNTER — ROUTINE PRENATAL (OUTPATIENT)
Dept: OBGYN CLINIC | Age: 38
End: 2023-05-11
Payer: MEDICAID

## 2023-05-11 VITALS — SYSTOLIC BLOOD PRESSURE: 112 MMHG | WEIGHT: 171 LBS | DIASTOLIC BLOOD PRESSURE: 70 MMHG | BODY MASS INDEX: 28.46 KG/M2

## 2023-05-11 DIAGNOSIS — Z13.0 SCREENING, ANEMIA, DEFICIENCY, IRON: ICD-10-CM

## 2023-05-11 DIAGNOSIS — O22.43 HEMORRHOIDS DURING PREGNANCY IN THIRD TRIMESTER: ICD-10-CM

## 2023-05-11 DIAGNOSIS — O09.523 AMA (ADVANCED MATERNAL AGE) MULTIGRAVIDA 35+, THIRD TRIMESTER: Primary | ICD-10-CM

## 2023-05-11 DIAGNOSIS — Z13.1 SCREENING FOR DIABETES MELLITUS: ICD-10-CM

## 2023-05-11 DIAGNOSIS — Z11.3 SCREEN FOR STD (SEXUALLY TRANSMITTED DISEASE): ICD-10-CM

## 2023-05-11 LAB
BASOPHILS # BLD: 0 K/UL (ref 0–0.2)
BASOPHILS NFR BLD: 0 % (ref 0–2)
DIFFERENTIAL METHOD BLD: ABNORMAL
EOSINOPHIL # BLD: 0 K/UL (ref 0–0.8)
EOSINOPHIL NFR BLD: 0 % (ref 0.5–7.8)
ERYTHROCYTE [DISTWIDTH] IN BLOOD BY AUTOMATED COUNT: 13.2 % (ref 11.9–14.6)
GLUCOSE 1 HOUR: 120 MG/DL
HCT VFR BLD AUTO: 35.3 % (ref 35.8–46.3)
HGB BLD-MCNC: 11.6 G/DL (ref 11.7–15.4)
IMM GRANULOCYTES # BLD AUTO: 0.1 K/UL (ref 0–0.5)
IMM GRANULOCYTES NFR BLD AUTO: 2 % (ref 0–5)
LYMPHOCYTES # BLD: 2.2 K/UL (ref 0.5–4.6)
LYMPHOCYTES NFR BLD: 32 % (ref 13–44)
MCH RBC QN AUTO: 29.7 PG (ref 26.1–32.9)
MCHC RBC AUTO-ENTMCNC: 32.9 G/DL (ref 31.4–35)
MCV RBC AUTO: 90.5 FL (ref 82–102)
MONOCYTES # BLD: 0.4 K/UL (ref 0.1–1.3)
MONOCYTES NFR BLD: 5 % (ref 4–12)
NEUTS SEG # BLD: 4.2 K/UL (ref 1.7–8.2)
NEUTS SEG NFR BLD: 61 % (ref 43–78)
NRBC # BLD: 0 K/UL (ref 0–0.2)
PLATELET # BLD AUTO: 127 K/UL (ref 150–450)
PMV BLD AUTO: 12.1 FL (ref 9.4–12.3)
RBC # BLD AUTO: 3.9 M/UL (ref 4.05–5.2)
WBC # BLD AUTO: 6.9 K/UL (ref 4.3–11.1)

## 2023-05-11 PROCEDURE — 99213 OFFICE O/P EST LOW 20 MIN: CPT | Performed by: OBSTETRICS & GYNECOLOGY

## 2023-05-11 RX ORDER — HYDROCORTISONE ACETATE 25 MG/1
25 SUPPOSITORY RECTAL 2 TIMES DAILY PRN
Qty: 24 SUPPOSITORY | Refills: 2 | Status: SHIPPED | OUTPATIENT
Start: 2023-05-11

## 2023-05-14 LAB
C TRACH RRNA SPEC QL NAA+PROBE: NEGATIVE
N GONORRHOEA RRNA SPEC QL NAA+PROBE: NEGATIVE
SPECIMEN SOURCE: NORMAL
T VAGINALIS RRNA SPEC QL NAA+PROBE: NEGATIVE

## 2023-05-31 ENCOUNTER — ROUTINE PRENATAL (OUTPATIENT)
Dept: OBGYN CLINIC | Age: 38
End: 2023-05-31
Payer: MEDICAID

## 2023-05-31 VITALS — DIASTOLIC BLOOD PRESSURE: 72 MMHG | BODY MASS INDEX: 29.29 KG/M2 | SYSTOLIC BLOOD PRESSURE: 118 MMHG | WEIGHT: 176 LBS

## 2023-05-31 DIAGNOSIS — O99.342 ANXIETY IN PREGNANCY IN SECOND TRIMESTER, ANTEPARTUM: ICD-10-CM

## 2023-05-31 DIAGNOSIS — Z34.93 ENCOUNTER FOR SUPERVISION OF LOW-RISK PREGNANCY IN THIRD TRIMESTER: Primary | ICD-10-CM

## 2023-05-31 DIAGNOSIS — R00.2 HEART PALPITATIONS: ICD-10-CM

## 2023-05-31 DIAGNOSIS — Z3A.30 30 WEEKS GESTATION OF PREGNANCY: ICD-10-CM

## 2023-05-31 DIAGNOSIS — F41.9 ANXIETY IN PREGNANCY IN SECOND TRIMESTER, ANTEPARTUM: ICD-10-CM

## 2023-05-31 LAB
ALBUMIN SERPL-MCNC: 2.8 G/DL (ref 3.5–5)
ALBUMIN/GLOB SERPL: 0.8 (ref 0.4–1.6)
ALP SERPL-CCNC: 108 U/L (ref 50–136)
ALT SERPL-CCNC: 11 U/L (ref 12–65)
ANION GAP SERPL CALC-SCNC: 8 MMOL/L (ref 2–11)
AST SERPL-CCNC: 10 U/L (ref 15–37)
BILIRUB SERPL-MCNC: 0.2 MG/DL (ref 0.2–1.1)
BUN SERPL-MCNC: 9 MG/DL (ref 6–23)
CALCIUM SERPL-MCNC: 9 MG/DL (ref 8.3–10.4)
CHLORIDE SERPL-SCNC: 106 MMOL/L (ref 101–110)
CO2 SERPL-SCNC: 23 MMOL/L (ref 21–32)
CREAT SERPL-MCNC: 0.5 MG/DL (ref 0.6–1)
GLOBULIN SER CALC-MCNC: 3.4 G/DL (ref 2.8–4.5)
GLUCOSE SERPL-MCNC: 85 MG/DL (ref 65–100)
MAGNESIUM SERPL-MCNC: 1.7 MG/DL (ref 1.8–2.4)
POTASSIUM SERPL-SCNC: 3.6 MMOL/L (ref 3.5–5.1)
PROT SERPL-MCNC: 6.2 G/DL (ref 6.3–8.2)
SODIUM SERPL-SCNC: 137 MMOL/L (ref 133–143)

## 2023-05-31 PROCEDURE — 99213 OFFICE O/P EST LOW 20 MIN: CPT | Performed by: NURSE PRACTITIONER

## 2023-05-31 NOTE — RESULT ENCOUNTER NOTE
Potassium wnl, Magnesium: 0.7 however, patient states takes magnesium supplement nightly. Okay to continue with this. Patient encouraged should palpitations worsen, to let us know and can place referral for cardiology at that time.

## 2023-05-31 NOTE — PATIENT INSTRUCTIONS
PTL/labor precautions, [address], and pregnancy warning signs reviewed. Pt advised to call the office at [phone] or go straight to Labor and Delivery at [facility] with any of the following concerns vaginal bleeding, leaking of fluid, rodney regularly Q 5-7 minutes for over an hour or not feeling the baby move.      Kick counts and pre-term labor precautions reviewed

## 2023-06-14 PROBLEM — D69.6 THROMBOCYTOPENIA AFFECTING PREGNANCY (HCC): Status: ACTIVE | Noted: 2023-06-14

## 2023-06-14 PROBLEM — O99.119 THROMBOCYTOPENIA AFFECTING PREGNANCY (HCC): Status: ACTIVE | Noted: 2023-06-14

## 2023-06-19 ENCOUNTER — INITIAL CONSULT (OUTPATIENT)
Age: 38
End: 2023-06-19

## 2023-06-19 VITALS
WEIGHT: 180 LBS | HEART RATE: 114 BPM | DIASTOLIC BLOOD PRESSURE: 76 MMHG | BODY MASS INDEX: 29.99 KG/M2 | HEIGHT: 65 IN | SYSTOLIC BLOOD PRESSURE: 122 MMHG

## 2023-06-19 DIAGNOSIS — R00.2 PALPITATIONS: Primary | ICD-10-CM

## 2023-06-26 ENCOUNTER — ROUTINE PRENATAL (OUTPATIENT)
Dept: OBGYN CLINIC | Age: 38
End: 2023-06-26
Payer: MEDICAID

## 2023-06-26 VITALS — SYSTOLIC BLOOD PRESSURE: 118 MMHG | BODY MASS INDEX: 30.12 KG/M2 | DIASTOLIC BLOOD PRESSURE: 72 MMHG | WEIGHT: 181 LBS

## 2023-06-26 DIAGNOSIS — F41.9 ANXIETY IN PREGNANCY IN SECOND TRIMESTER, ANTEPARTUM: ICD-10-CM

## 2023-06-26 DIAGNOSIS — Z3A.34 34 WEEKS GESTATION OF PREGNANCY: ICD-10-CM

## 2023-06-26 DIAGNOSIS — Z34.83 PRENATAL CARE, SUBSEQUENT PREGNANCY IN THIRD TRIMESTER: Primary | ICD-10-CM

## 2023-06-26 DIAGNOSIS — O09.523 ADVANCED MATERNAL AGE IN MULTIGRAVIDA, THIRD TRIMESTER: ICD-10-CM

## 2023-06-26 DIAGNOSIS — O99.342 ANXIETY IN PREGNANCY IN SECOND TRIMESTER, ANTEPARTUM: ICD-10-CM

## 2023-06-26 DIAGNOSIS — D69.6 THROMBOCYTOPENIA AFFECTING PREGNANCY (HCC): ICD-10-CM

## 2023-06-26 DIAGNOSIS — O99.119 THROMBOCYTOPENIA AFFECTING PREGNANCY (HCC): ICD-10-CM

## 2023-06-26 PROCEDURE — 99213 OFFICE O/P EST LOW 20 MIN: CPT | Performed by: NURSE PRACTITIONER

## 2023-06-26 PROCEDURE — 76816 OB US FOLLOW-UP PER FETUS: CPT | Performed by: NURSE PRACTITIONER

## 2023-07-01 DIAGNOSIS — O22.43 HEMORRHOIDS DURING PREGNANCY IN THIRD TRIMESTER: ICD-10-CM

## 2023-07-03 ENCOUNTER — ROUTINE PRENATAL (OUTPATIENT)
Dept: OBGYN CLINIC | Age: 38
End: 2023-07-03
Payer: MEDICAID

## 2023-07-03 VITALS — WEIGHT: 184 LBS | SYSTOLIC BLOOD PRESSURE: 108 MMHG | BODY MASS INDEX: 30.62 KG/M2 | DIASTOLIC BLOOD PRESSURE: 68 MMHG

## 2023-07-03 DIAGNOSIS — O99.119 THROMBOCYTOPENIA AFFECTING PREGNANCY (HCC): ICD-10-CM

## 2023-07-03 DIAGNOSIS — O44.01 PLACENTA PREVIA IN FIRST TRIMESTER: Primary | ICD-10-CM

## 2023-07-03 DIAGNOSIS — O09.522 AMA (ADVANCED MATERNAL AGE) MULTIGRAVIDA 35+, SECOND TRIMESTER: ICD-10-CM

## 2023-07-03 DIAGNOSIS — O22.43 HEMORRHOIDS DURING PREGNANCY IN THIRD TRIMESTER: ICD-10-CM

## 2023-07-03 DIAGNOSIS — D69.6 THROMBOCYTOPENIA AFFECTING PREGNANCY (HCC): ICD-10-CM

## 2023-07-03 LAB
ERYTHROCYTE [DISTWIDTH] IN BLOOD BY AUTOMATED COUNT: 12.9 % (ref 11.9–14.6)
HCT VFR BLD AUTO: 37.4 % (ref 35.8–46.3)
HGB BLD-MCNC: 12.1 G/DL (ref 11.7–15.4)
MCH RBC QN AUTO: 29.2 PG (ref 26.1–32.9)
MCHC RBC AUTO-ENTMCNC: 32.4 G/DL (ref 31.4–35)
MCV RBC AUTO: 90.3 FL (ref 82–102)
NRBC # BLD: 0 K/UL (ref 0–0.2)
PLATELET # BLD AUTO: 131 K/UL (ref 150–450)
PMV BLD AUTO: 12.9 FL (ref 9.4–12.3)
RBC # BLD AUTO: 4.14 M/UL (ref 4.05–5.2)
WBC # BLD AUTO: 8.9 K/UL (ref 4.3–11.1)

## 2023-07-03 PROCEDURE — 99213 OFFICE O/P EST LOW 20 MIN: CPT | Performed by: OBSTETRICS & GYNECOLOGY

## 2023-07-03 RX ORDER — HYDROCORTISONE 25 MG/G
CREAM TOPICAL PRN
Qty: 28 G | Refills: 1 | Status: SHIPPED | OUTPATIENT
Start: 2023-07-03

## 2023-07-03 RX ORDER — HYDROCORTISONE ACETATE 25 MG/1
25 SUPPOSITORY RECTAL 2 TIMES DAILY PRN
Qty: 24 SUPPOSITORY | Refills: 2 | OUTPATIENT
Start: 2023-07-03

## 2023-07-03 ASSESSMENT — PATIENT HEALTH QUESTIONNAIRE - PHQ9
1. LITTLE INTEREST OR PLEASURE IN DOING THINGS: 0
SUM OF ALL RESPONSES TO PHQ QUESTIONS 1-9: 0
SUM OF ALL RESPONSES TO PHQ9 QUESTIONS 1 & 2: 0
SUM OF ALL RESPONSES TO PHQ QUESTIONS 1-9: 0
1. LITTLE INTEREST OR PLEASURE IN DOING THINGS: 0
SUM OF ALL RESPONSES TO PHQ QUESTIONS 1-9: 0
SUM OF ALL RESPONSES TO PHQ9 QUESTIONS 1 & 2: 0
SUM OF ALL RESPONSES TO PHQ QUESTIONS 1-9: 0
2. FEELING DOWN, DEPRESSED OR HOPELESS: 0
SUM OF ALL RESPONSES TO PHQ QUESTIONS 1-9: 0
2. FEELING DOWN, DEPRESSED OR HOPELESS: 0
SUM OF ALL RESPONSES TO PHQ QUESTIONS 1-9: 0

## 2023-07-03 NOTE — TELEPHONE ENCOUNTER
Patient seen today in office. Rx for cream sent instead.  Will wait to see if insurance covers cream.

## 2023-07-03 NOTE — PROGRESS NOTES
Brianna  presents for REYMUNDO. [id number]. 35w4d. PL and MFM notes reviewed as applicable.   Taking Prenatal Vitamins: Yes  She is noticing:  no unusual complaints    Thrombocytopenia affecting pregnancy ([address])   Well-controlled, continue current treatment plan and CBC today    AMA (advanced maternal age) multigravida 33+, second trimester   noted

## 2023-07-13 ENCOUNTER — HOSPITAL ENCOUNTER (OUTPATIENT)
Age: 38
Discharge: HOME | End: 2023-07-14
Attending: OBSTETRICS & GYNECOLOGY | Admitting: OBSTETRICS & GYNECOLOGY
Payer: MEDICAID

## 2023-07-13 ENCOUNTER — ROUTINE PRENATAL (OUTPATIENT)
Dept: OBGYN CLINIC | Age: 38
End: 2023-07-13

## 2023-07-13 VITALS — WEIGHT: 185 LBS | BODY MASS INDEX: 30.79 KG/M2 | DIASTOLIC BLOOD PRESSURE: 78 MMHG | SYSTOLIC BLOOD PRESSURE: 122 MMHG

## 2023-07-13 DIAGNOSIS — D69.6 THROMBOCYTOPENIA AFFECTING PREGNANCY (HCC): Primary | ICD-10-CM

## 2023-07-13 DIAGNOSIS — O99.119 THROMBOCYTOPENIA AFFECTING PREGNANCY (HCC): Primary | ICD-10-CM

## 2023-07-13 DIAGNOSIS — Z34.90 ENCOUNTER FOR SUPERVISION OF LOW-RISK PREGNANCY, ANTEPARTUM: ICD-10-CM

## 2023-07-13 DIAGNOSIS — Z36.85 ANTENATAL SCREENING FOR STREPTOCOCCUS B: ICD-10-CM

## 2023-07-14 VITALS
TEMPERATURE: 97.8 F | RESPIRATION RATE: 17 BRPM | DIASTOLIC BLOOD PRESSURE: 79 MMHG | OXYGEN SATURATION: 98 % | HEART RATE: 123 BPM | SYSTOLIC BLOOD PRESSURE: 104 MMHG

## 2023-07-14 PROCEDURE — 6370000000 HC RX 637 (ALT 250 FOR IP): Performed by: OBSTETRICS & GYNECOLOGY

## 2023-07-14 PROCEDURE — 99282 EMERGENCY DEPT VISIT SF MDM: CPT

## 2023-07-14 RX ORDER — OXYCODONE HYDROCHLORIDE 5 MG/1
5 TABLET ORAL ONCE
Status: COMPLETED | OUTPATIENT
Start: 2023-07-14 | End: 2023-07-14

## 2023-07-14 RX ADMIN — OXYCODONE HYDROCHLORIDE 5 MG: 5 TABLET ORAL at 00:52

## 2023-07-14 ASSESSMENT — PAIN SCALES - GENERAL: PAINLEVEL_OUTOF10: 5

## 2023-07-14 ASSESSMENT — PAIN DESCRIPTION - LOCATION: LOCATION: BACK

## 2023-07-14 NOTE — DISCHARGE INSTRUCTIONS
PLEASE FOLLOW-UP WITH PRIMARY OB AT NEXT SCHEDULED VISIT. RETURN TO A [facility] IF EXPERIENCING CONTRACTIONS THAT BECOME STRONGER AND CLOSER TOGETHER, VAGINAL BLEEDING, LEAKING OF FLUIDS, OR DECREASED FETAL MOVEMENT. Pregnancy Precautions: Care Instructions  Your Care Instructions     There is no sure way to prevent labor before your due date ( labor) or to prevent most other pregnancy problems. But there are things you can do to increase your chances of a healthy pregnancy. Go to your appointments, follow your doctor's advice, and take good care of yourself. Eat well, and exercise (if your doctor agrees). And make sure to drink plenty of water. Follow-up care is a key part of your treatment and safety. Be sure to make and go to all appointments, and call your doctor if you are having problems. It's also a good idea to know your test results and keep a list of the medicines you take. How can you care for yourself at home? Make sure you go to your prenatal appointments. At each visit, your doctor will check your blood pressure. Your doctor will also check to see if you have protein in your urine. High blood pressure and protein in urine are signs of preeclampsia. This condition can be dangerous for you and your baby. Drink plenty of fluids. Dehydration can cause contractions. If you have kidney, heart, or liver disease and have to limit fluids, talk with your doctor before you increase the amount of fluids you drink. Tell your doctor right away if you notice any symptoms of an infection, such as:  Burning when you urinate. A foul-smelling discharge from your vagina. Vaginal itching. Unexplained fever. Unusual pain or soreness in your uterus or lower belly. Eat a balanced diet. Include plenty of foods that are high in calcium and iron. Foods high in calcium include milk, cheese, yogurt, almonds, and broccoli.   Foods high in iron include red meat, shellfish, poultry, eggs, beans, raisins,

## 2023-07-14 NOTE — ED TRIAGE NOTES
OB Triage Visit - H&P    Name: Sylvia Kaiser MRN: [id number]  SSN: [id number]    YOB: 1985  Age: 41 y.o. Sex: female      Subjective:     Reason for Triage visit:  37w1d and pelvic pressure/ back pain    History of Present Illness: Ms. Tello Sherman is a 41 y.o. female [id number] with an estimated gestational age of 43w4d with Estimated Date of Delivery: 8/3/23. Patient states that she has been having pelvic pressure today and lower back pain. Denies regular painful contractions. No vaginal bleeding or leakage of fluid. Good FM. States she was 3 cm at office visit yesterday (). Pregnancy has been complicated by: Thrombocytopenia, plts 131 (7/3/23)   Placenta previa, RESOLVED   AMA  Anxiety  Hemorrhoids  Recent onset maternal tachycardia      Patient denies headache , vaginal bleeding , vaginal leaking of fluid , visual disturbances, and dysuria, urinary frequency, and urinary urgency.     OB History    Para Term  AB Living   4 2 2   1 2   SAB IAB Ectopic Molar Multiple Live Births   1         2      # Outcome Date GA Lbr Daniel/2nd Weight Sex Delivery Anes PTL Lv   4 Current            3 Term 21 39w1d 06:26 / 00:27 8 lb 7.1 oz (3.83 kg) M Vag-Spont EPI N JOE      Birth Comments: Bebo Yarbrough   2 SAB 2019              Birth Comments: D&C   1 Term 10/16/17 39w6d 10:03 / 00:21 7 lb 1.1 oz (3.205 kg) F Vag-Spont  N JOE      Birth Comments: [place]     Past Medical History:   Diagnosis Date    Anemia     Anxiety in pregnancy in second trimester, antepartum 2023    Breast disorder     right breast lump-benign     Past Surgical History:   Procedure Laterality Date    DILATION AND CURETTAGE OF UTERUS      for MAB around 9wks     REFRACTIVE SURGERY      WISDOM TOOTH EXTRACTION       Social History     Occupational History    Not on file   Tobacco Use    Smoking status: Former     Packs/day: 0.25     Types: Cigarettes     Quit date: 2/10/2017     Years since quittin.4    Smokeless

## 2023-07-17 LAB
BACTERIA SPEC CULT: NORMAL
SERVICE CMNT-IMP: NORMAL

## 2023-07-19 ENCOUNTER — ROUTINE PRENATAL (OUTPATIENT)
Dept: OBGYN CLINIC | Age: 38
End: 2023-07-19
Payer: MEDICAID

## 2023-07-19 VITALS — SYSTOLIC BLOOD PRESSURE: 116 MMHG | BODY MASS INDEX: 30.95 KG/M2 | DIASTOLIC BLOOD PRESSURE: 72 MMHG | WEIGHT: 186 LBS

## 2023-07-19 DIAGNOSIS — Z3A.37 37 WEEKS GESTATION OF PREGNANCY: ICD-10-CM

## 2023-07-19 DIAGNOSIS — D69.6 THROMBOCYTOPENIA AFFECTING PREGNANCY (HCC): Primary | ICD-10-CM

## 2023-07-19 DIAGNOSIS — O99.119 THROMBOCYTOPENIA AFFECTING PREGNANCY (HCC): Primary | ICD-10-CM

## 2023-07-19 DIAGNOSIS — Z34.90 ENCOUNTER FOR SUPERVISION OF LOW-RISK PREGNANCY, ANTEPARTUM: ICD-10-CM

## 2023-07-19 PROCEDURE — 99213 OFFICE O/P EST LOW 20 MIN: CPT | Performed by: OBSTETRICS & GYNECOLOGY

## 2023-07-19 PROCEDURE — 76816 OB US FOLLOW-UP PER FETUS: CPT | Performed by: OBSTETRICS & GYNECOLOGY

## 2023-07-19 NOTE — PROGRESS NOTES
Chief Complaint   Patient presents with    Routine Prenatal Visit     32KS [id number] @ 37w6d for REYMUNDO:    +FM, + pressure, no VB, no LOF. Vitals:    23 1454   BP: 116/72     Weight Metrics 2023 2023 7/3/2023 2023 2023 2023 2023   Weight 186 lb 185 lb 184 lb 181 lb 180 lb 178 lb 176 lb   BMI (Calculated) 0 kg/m2 0 kg/m2 0 kg/m2 0 kg/m2 30 kg/m2 0 kg/m2 0 kg/m2       US today - see scanned report. EFW 9lbs 10oz! 1. Thrombocytopenia affecting pregnancy (HCC)    2. 37 weeks gestation of pregnancy    3. Encounter for supervision of low-risk pregnancy, antepartum      Orders Placed This Encounter   Procedures    AMB POC US OB RE-EVAL/FOLLOW UP     No orders of the defined types were placed in this encounter. Routine OB counseling reviewed. Denies any complaints related to her anxiety as discussed at previous appts. PNL reviewed & confirmed complete. Last pap . Platelets 006 (stable for now)  B positive, Antibody neg. Growth US per MFM due to accelerated fetal growth & AMA - fetus estimated in macrosomic range. R/b/a to PLTCD discussed and pt desires . Will schedule. Greater than 20 minutes spent on same day of service in reviewing her prenatal record, problem list, MFM notes if applicable and in face to face counseling, education, and examining the patient regarding her exam findings, indications for further tests, and discussion of assessment and plan as stated above.

## 2023-07-20 ENCOUNTER — HOSPITAL ENCOUNTER (OUTPATIENT)
Age: 38
Discharge: HOME | End: 2023-07-20
Attending: OBSTETRICS & GYNECOLOGY | Admitting: OBSTETRICS & GYNECOLOGY
Payer: MEDICAID

## 2023-07-20 ENCOUNTER — TELEPHONE (OUTPATIENT)
Dept: OBGYN CLINIC | Age: 38
End: 2023-07-20

## 2023-07-20 VITALS
HEIGHT: 64 IN | SYSTOLIC BLOOD PRESSURE: 112 MMHG | HEART RATE: 116 BPM | WEIGHT: 186 LBS | DIASTOLIC BLOOD PRESSURE: 88 MMHG | BODY MASS INDEX: 31.76 KG/M2 | RESPIRATION RATE: 16 BRPM | TEMPERATURE: 97.8 F | OXYGEN SATURATION: 97 %

## 2023-07-20 PROCEDURE — 99283 EMERGENCY DEPT VISIT LOW MDM: CPT

## 2023-07-21 ENCOUNTER — NURSE ONLY (OUTPATIENT)
Dept: OBGYN CLINIC | Age: 38
End: 2023-07-21

## 2023-07-21 DIAGNOSIS — O99.119 THROMBOCYTOPENIA AFFECTING PREGNANCY (HCC): ICD-10-CM

## 2023-07-21 DIAGNOSIS — O09.523 AMA (ADVANCED MATERNAL AGE) MULTIGRAVIDA 35+, THIRD TRIMESTER: Primary | ICD-10-CM

## 2023-07-21 DIAGNOSIS — D69.6 THROMBOCYTOPENIA AFFECTING PREGNANCY (HCC): ICD-10-CM

## 2023-07-21 LAB
BASOPHILS # BLD: 0 K/UL (ref 0–0.2)
BASOPHILS NFR BLD: 0 % (ref 0–2)
DIFFERENTIAL METHOD BLD: ABNORMAL
EOSINOPHIL # BLD: 0.1 K/UL (ref 0–0.8)
EOSINOPHIL NFR BLD: 1 % (ref 0.5–7.8)
ERYTHROCYTE [DISTWIDTH] IN BLOOD BY AUTOMATED COUNT: 13.2 % (ref 11.9–14.6)
HCT VFR BLD AUTO: 35.9 % (ref 35.8–46.3)
HGB BLD-MCNC: 11.6 G/DL (ref 11.7–15.4)
IMM GRANULOCYTES # BLD AUTO: 0.1 K/UL (ref 0–0.5)
IMM GRANULOCYTES NFR BLD AUTO: 1 % (ref 0–5)
LYMPHOCYTES # BLD: 2.1 K/UL (ref 0.5–4.6)
LYMPHOCYTES NFR BLD: 28 % (ref 13–44)
MCH RBC QN AUTO: 29 PG (ref 26.1–32.9)
MCHC RBC AUTO-ENTMCNC: 32.3 G/DL (ref 31.4–35)
MCV RBC AUTO: 89.8 FL (ref 82–102)
MONOCYTES # BLD: 0.4 K/UL (ref 0.1–1.3)
MONOCYTES NFR BLD: 5 % (ref 4–12)
NEUTS SEG # BLD: 4.8 K/UL (ref 1.7–8.2)
NEUTS SEG NFR BLD: 64 % (ref 43–78)
NRBC # BLD: 0 K/UL (ref 0–0.2)
PLATELET # BLD AUTO: 121 K/UL (ref 150–450)
PMV BLD AUTO: 13.1 FL (ref 9.4–12.3)
RBC # BLD AUTO: 4 M/UL (ref 4.05–5.2)
WBC # BLD AUTO: 7.5 K/UL (ref 4.3–11.1)

## 2023-07-21 NOTE — TELEPHONE ENCOUNTER
Per  to schedule  for 23 at 1230 pm.     [facility] [employer] [facility] [employer] L&D,  scheduled for 23 at 1230 pm with . Pt instructions sent via "[employer]". IOL form faxed to L&D.

## 2023-07-21 NOTE — PROGRESS NOTES
Pt reports to [facility] with c/o sudden worsening pelvic pressure. Pt denies contractions, abd tenderness and/or vag bleeding. Pt reports +FM.    EFM and TOCO placed

## 2023-07-24 ENCOUNTER — ROUTINE PRENATAL (OUTPATIENT)
Dept: OBGYN CLINIC | Age: 38
End: 2023-07-24
Payer: MEDICAID

## 2023-07-24 VITALS — DIASTOLIC BLOOD PRESSURE: 76 MMHG | SYSTOLIC BLOOD PRESSURE: 120 MMHG | WEIGHT: 191 LBS | BODY MASS INDEX: 32.79 KG/M2

## 2023-07-24 DIAGNOSIS — O99.119 THROMBOCYTOPENIA AFFECTING PREGNANCY (HCC): Primary | ICD-10-CM

## 2023-07-24 DIAGNOSIS — Z34.90 ENCOUNTER FOR SUPERVISION OF LOW-RISK PREGNANCY, ANTEPARTUM: ICD-10-CM

## 2023-07-24 DIAGNOSIS — D69.6 THROMBOCYTOPENIA AFFECTING PREGNANCY (HCC): Primary | ICD-10-CM

## 2023-07-24 PROCEDURE — 99213 OFFICE O/P EST LOW 20 MIN: CPT | Performed by: OBSTETRICS & GYNECOLOGY

## 2023-07-24 NOTE — PROGRESS NOTES
Chief Complaint   Patient presents with    Routine Prenatal Visit     Chief Complaint   Patient presents with    Routine Prenatal Visit     34AH [id number] @ 38w4d for REYMUNDO:    +FM, + pressure, no VB, no LOF. Vitals:    23 1503   BP: 120/76     Weight Metrics 2023 2023 2023 2023 7/3/2023 2023 2023   Weight 191 lb 186 lb 186 lb 185 lb 184 lb 181 lb 180 lb   BMI (Calculated) 0 kg/m2 32 kg/m2 0 kg/m2 0 kg/m2 0 kg/m2 0 kg/m2 30 kg/m2       US last week - see scanned report. EFW 9lbs 10oz! +++FHTs today  EAQ:/8, cephalic      1. Thrombocytopenia affecting pregnancy ([address])    2. Encounter for supervision of low-risk pregnancy, antepartum      No orders of the defined types were placed in this encounter. No orders of the defined types were placed in this encounter. Routine OB counseling reviewed. Denies any complaints related to her anxiety as discussed at previous appts. PNL reviewed & confirmed complete. Last pap . Platelets 697 (stable for now)  B positive, Antibody neg. Growth US per MFM due to accelerated fetal growth & AMA - fetus estimated in macrosomic range. R/b/a to PLTCD discussed and pt desires . Scheduled for Friday - may be able to change to Thursday, will follow up. Greater than 20 minutes spent on same day of service in reviewing her prenatal record, problem list, MFM notes if applicable and in face to face counseling, education, and examining the patient regarding her exam findings, indications for further tests, and discussion of assessment and plan as stated above.

## 2023-07-26 ENCOUNTER — ANESTHESIA EVENT (OUTPATIENT)
Dept: OPERATING ROOM | Age: 38
End: 2023-07-26
Payer: MEDICAID

## 2023-07-27 ENCOUNTER — ANESTHESIA (OUTPATIENT)
Dept: OPERATING ROOM | Age: 38
End: 2023-07-27
Payer: MEDICAID

## 2023-07-27 ENCOUNTER — HOSPITAL ENCOUNTER (INPATIENT)
Age: 38
LOS: 3 days | Discharge: HOME | DRG: 540 | End: 2023-07-30
Attending: OBSTETRICS & GYNECOLOGY | Admitting: OBSTETRICS & GYNECOLOGY
Payer: MEDICAID

## 2023-07-27 PROBLEM — Z3A.39 39 WEEKS GESTATION OF PREGNANCY: Status: ACTIVE | Noted: 2023-07-27

## 2023-07-27 LAB
ABO + RH BLD: NORMAL
BLOOD GROUP ANTIBODIES SERPL: NORMAL
ERYTHROCYTE [DISTWIDTH] IN BLOOD BY AUTOMATED COUNT: 13.4 % (ref 11.9–14.6)
HCT VFR BLD AUTO: 37.5 % (ref 35.8–46.3)
HGB BLD-MCNC: 12.3 G/DL (ref 11.7–15.4)
MCH RBC QN AUTO: 28.1 PG (ref 26.1–32.9)
MCHC RBC AUTO-ENTMCNC: 32.8 G/DL (ref 31.4–35)
MCV RBC AUTO: 85.8 FL (ref 82–102)
NRBC # BLD: 0 K/UL (ref 0–0.2)
PLATELET # BLD AUTO: 133 K/UL (ref 150–450)
PMV BLD AUTO: 12.7 FL (ref 9.4–12.3)
RBC # BLD AUTO: 4.37 M/UL (ref 4.05–5.2)
SPECIMEN EXP DATE BLD: NORMAL
WBC # BLD AUTO: 8.6 K/UL (ref 4.3–11.1)

## 2023-07-27 PROCEDURE — 6360000002 HC RX W HCPCS: Performed by: OBSTETRICS & GYNECOLOGY

## 2023-07-27 PROCEDURE — 6360000002 HC RX W HCPCS: Performed by: REGISTERED NURSE

## 2023-07-27 PROCEDURE — 86850 RBC ANTIBODY SCREEN: CPT

## 2023-07-27 PROCEDURE — 6360000002 HC RX W HCPCS: Performed by: ANESTHESIOLOGY

## 2023-07-27 PROCEDURE — 85027 COMPLETE CBC AUTOMATED: CPT

## 2023-07-27 PROCEDURE — 3609079900 HC CESAREAN SECTION: Performed by: OBSTETRICS & GYNECOLOGY

## 2023-07-27 PROCEDURE — 2709999900 HC NON-CHARGEABLE SUPPLY: Performed by: OBSTETRICS & GYNECOLOGY

## 2023-07-27 PROCEDURE — 6360000002 HC RX W HCPCS: Performed by: NURSE ANESTHETIST, CERTIFIED REGISTERED

## 2023-07-27 PROCEDURE — 86901 BLOOD TYPING SEROLOGIC RH(D): CPT

## 2023-07-27 PROCEDURE — 6370000000 HC RX 637 (ALT 250 FOR IP): Performed by: ANESTHESIOLOGY

## 2023-07-27 PROCEDURE — 6360000002 HC RX W HCPCS: Performed by: STUDENT IN AN ORGANIZED HEALTH CARE EDUCATION/TRAINING PROGRAM

## 2023-07-27 PROCEDURE — 2500000003 HC RX 250 WO HCPCS: Performed by: NURSE ANESTHETIST, CERTIFIED REGISTERED

## 2023-07-27 PROCEDURE — 3700000001 HC ADD 15 MINUTES (ANESTHESIA): Performed by: OBSTETRICS & GYNECOLOGY

## 2023-07-27 PROCEDURE — 7100000001 HC PACU RECOVERY - ADDTL 15 MIN: Performed by: OBSTETRICS & GYNECOLOGY

## 2023-07-27 PROCEDURE — 2580000003 HC RX 258: Performed by: NURSE ANESTHETIST, CERTIFIED REGISTERED

## 2023-07-27 PROCEDURE — 3700000000 HC ANESTHESIA ATTENDED CARE: Performed by: OBSTETRICS & GYNECOLOGY

## 2023-07-27 PROCEDURE — 2580000003 HC RX 258: Performed by: OBSTETRICS & GYNECOLOGY

## 2023-07-27 PROCEDURE — 1100000000 HC RM PRIVATE

## 2023-07-27 PROCEDURE — 86900 BLOOD TYPING SEROLOGIC ABO: CPT

## 2023-07-27 PROCEDURE — 6370000000 HC RX 637 (ALT 250 FOR IP): Performed by: OBSTETRICS & GYNECOLOGY

## 2023-07-27 PROCEDURE — 6370000000 HC RX 637 (ALT 250 FOR IP): Performed by: STUDENT IN AN ORGANIZED HEALTH CARE EDUCATION/TRAINING PROGRAM

## 2023-07-27 PROCEDURE — 4A1HXCZ MONITORING OF PRODUCTS OF CONCEPTION, CARDIAC RATE, EXTERNAL APPROACH: ICD-10-PCS | Performed by: OBSTETRICS & GYNECOLOGY

## 2023-07-27 PROCEDURE — 59514 CESAREAN DELIVERY ONLY: CPT | Performed by: OBSTETRICS & GYNECOLOGY

## 2023-07-27 PROCEDURE — 6360000002 HC RX W HCPCS

## 2023-07-27 PROCEDURE — 7100000000 HC PACU RECOVERY - FIRST 15 MIN: Performed by: OBSTETRICS & GYNECOLOGY

## 2023-07-27 RX ORDER — KETOROLAC TROMETHAMINE 30 MG/ML
30 INJECTION, SOLUTION INTRAMUSCULAR; INTRAVENOUS EVERY 6 HOURS PRN
Status: DISPENSED | OUTPATIENT
Start: 2023-07-27 | End: 2023-07-28

## 2023-07-27 RX ORDER — KETOROLAC TROMETHAMINE 30 MG/ML
30 INJECTION, SOLUTION INTRAMUSCULAR; INTRAVENOUS EVERY 6 HOURS PRN
Status: DISCONTINUED | OUTPATIENT
Start: 2023-07-27 | End: 2023-07-27

## 2023-07-27 RX ORDER — SODIUM CHLORIDE 0.9 % (FLUSH) 0.9 %
5-40 SYRINGE (ML) INJECTION PRN
Status: DISCONTINUED | OUTPATIENT
Start: 2023-07-27 | End: 2023-07-27

## 2023-07-27 RX ORDER — SODIUM CHLORIDE, SODIUM LACTATE, POTASSIUM CHLORIDE, AND CALCIUM CHLORIDE .6; .31; .03; .02 G/100ML; G/100ML; G/100ML; G/100ML
1000 INJECTION, SOLUTION INTRAVENOUS ONCE
Status: DISCONTINUED | OUTPATIENT
Start: 2023-07-27 | End: 2023-07-30 | Stop reason: HOSPADM

## 2023-07-27 RX ORDER — SODIUM CHLORIDE 9 MG/ML
INJECTION, SOLUTION INTRAVENOUS PRN
Status: DISCONTINUED | OUTPATIENT
Start: 2023-07-27 | End: 2023-07-27

## 2023-07-27 RX ORDER — KETOROLAC TROMETHAMINE 30 MG/ML
INJECTION, SOLUTION INTRAMUSCULAR; INTRAVENOUS PRN
Status: DISCONTINUED | OUTPATIENT
Start: 2023-07-27 | End: 2023-07-27 | Stop reason: SDUPTHER

## 2023-07-27 RX ORDER — FAMOTIDINE 20 MG/1
20 TABLET, FILM COATED ORAL ONCE
Status: DISCONTINUED | OUTPATIENT
Start: 2023-07-27 | End: 2023-07-30 | Stop reason: HOSPADM

## 2023-07-27 RX ORDER — DIPHENHYDRAMINE HYDROCHLORIDE 50 MG/ML
12.5 INJECTION INTRAMUSCULAR; INTRAVENOUS EVERY 6 HOURS PRN
Status: DISCONTINUED | OUTPATIENT
Start: 2023-07-27 | End: 2023-07-28 | Stop reason: SDUPTHER

## 2023-07-27 RX ORDER — ONDANSETRON 2 MG/ML
4 INJECTION INTRAMUSCULAR; INTRAVENOUS EVERY 6 HOURS PRN
Status: ACTIVE | OUTPATIENT
Start: 2023-07-27 | End: 2023-07-28

## 2023-07-27 RX ORDER — SODIUM CHLORIDE 0.9 % (FLUSH) 0.9 %
10 SYRINGE (ML) INJECTION EVERY 12 HOURS SCHEDULED
Status: DISCONTINUED | OUTPATIENT
Start: 2023-07-27 | End: 2023-07-27

## 2023-07-27 RX ORDER — BUPIVACAINE HYDROCHLORIDE 7.5 MG/ML
INJECTION, SOLUTION INTRASPINAL
Status: COMPLETED | OUTPATIENT
Start: 2023-07-27 | End: 2023-07-27

## 2023-07-27 RX ORDER — LIDOCAINE HYDROCHLORIDE 10 MG/ML
1 INJECTION, SOLUTION INFILTRATION; PERINEURAL
Status: DISCONTINUED | OUTPATIENT
Start: 2023-07-27 | End: 2023-07-27

## 2023-07-27 RX ORDER — SODIUM CHLORIDE 9 MG/ML
INJECTION, SOLUTION INTRAVENOUS PRN
Status: DISCONTINUED | OUTPATIENT
Start: 2023-07-27 | End: 2023-07-30 | Stop reason: HOSPADM

## 2023-07-27 RX ORDER — SODIUM CHLORIDE, SODIUM LACTATE, POTASSIUM CHLORIDE, CALCIUM CHLORIDE 600; 310; 30; 20 MG/100ML; MG/100ML; MG/100ML; MG/100ML
INJECTION, SOLUTION INTRAVENOUS CONTINUOUS
Status: DISCONTINUED | OUTPATIENT
Start: 2023-07-27 | End: 2023-07-27

## 2023-07-27 RX ORDER — FAMOTIDINE 20 MG/1
20 TABLET, FILM COATED ORAL ONCE
Status: COMPLETED | OUTPATIENT
Start: 2023-07-27 | End: 2023-07-27

## 2023-07-27 RX ORDER — TRISODIUM CITRATE DIHYDRATE AND CITRIC ACID MONOHYDRATE 500; 334 MG/5ML; MG/5ML
30 SOLUTION ORAL ONCE
Status: COMPLETED | OUTPATIENT
Start: 2023-07-27 | End: 2023-07-27

## 2023-07-27 RX ORDER — SODIUM CHLORIDE 0.9 % (FLUSH) 0.9 %
5-40 SYRINGE (ML) INJECTION EVERY 12 HOURS SCHEDULED
Status: DISCONTINUED | OUTPATIENT
Start: 2023-07-27 | End: 2023-07-30 | Stop reason: HOSPADM

## 2023-07-27 RX ORDER — PROCHLORPERAZINE EDISYLATE 5 MG/ML
INJECTION INTRAMUSCULAR; INTRAVENOUS PRN
Status: DISCONTINUED | OUTPATIENT
Start: 2023-07-27 | End: 2023-07-27 | Stop reason: SDUPTHER

## 2023-07-27 RX ORDER — SODIUM CHLORIDE 0.9 % (FLUSH) 0.9 %
10 SYRINGE (ML) INJECTION PRN
Status: DISCONTINUED | OUTPATIENT
Start: 2023-07-27 | End: 2023-07-27

## 2023-07-27 RX ORDER — FAMOTIDINE 20 MG/1
20 TABLET, FILM COATED ORAL 2 TIMES DAILY
Status: DISCONTINUED | OUTPATIENT
Start: 2023-07-27 | End: 2023-07-30 | Stop reason: HOSPADM

## 2023-07-27 RX ORDER — MORPHINE SULFATE 1 MG/ML
INJECTION, SOLUTION EPIDURAL; INTRATHECAL; INTRAVENOUS
Status: COMPLETED | OUTPATIENT
Start: 2023-07-27 | End: 2023-07-27

## 2023-07-27 RX ORDER — SODIUM CHLORIDE, SODIUM LACTATE, POTASSIUM CHLORIDE, CALCIUM CHLORIDE 600; 310; 30; 20 MG/100ML; MG/100ML; MG/100ML; MG/100ML
INJECTION, SOLUTION INTRAVENOUS CONTINUOUS PRN
Status: DISCONTINUED | OUTPATIENT
Start: 2023-07-27 | End: 2023-07-27 | Stop reason: SDUPTHER

## 2023-07-27 RX ORDER — SODIUM CHLORIDE 0.9 % (FLUSH) 0.9 %
5-40 SYRINGE (ML) INJECTION EVERY 12 HOURS SCHEDULED
Status: DISCONTINUED | OUTPATIENT
Start: 2023-07-27 | End: 2023-07-27

## 2023-07-27 RX ORDER — SODIUM CHLORIDE 0.9 % (FLUSH) 0.9 %
5-40 SYRINGE (ML) INJECTION PRN
Status: DISCONTINUED | OUTPATIENT
Start: 2023-07-27 | End: 2023-07-30 | Stop reason: HOSPADM

## 2023-07-27 RX ORDER — ONDANSETRON 2 MG/ML
INJECTION INTRAMUSCULAR; INTRAVENOUS PRN
Status: DISCONTINUED | OUTPATIENT
Start: 2023-07-27 | End: 2023-07-27 | Stop reason: SDUPTHER

## 2023-07-27 RX ORDER — HYDROMORPHONE HYDROCHLORIDE 1 MG/ML
1 INJECTION, SOLUTION INTRAMUSCULAR; INTRAVENOUS; SUBCUTANEOUS EVERY 4 HOURS PRN
Status: ACTIVE | OUTPATIENT
Start: 2023-07-27 | End: 2023-07-28

## 2023-07-27 RX ORDER — OXYCODONE HYDROCHLORIDE 5 MG/1
5 TABLET ORAL EVERY 4 HOURS PRN
Status: DISPENSED | OUTPATIENT
Start: 2023-07-27 | End: 2023-07-28

## 2023-07-27 RX ORDER — ONDANSETRON 2 MG/ML
4 INJECTION INTRAMUSCULAR; INTRAVENOUS ONCE
Status: COMPLETED | OUTPATIENT
Start: 2023-07-27 | End: 2023-07-27

## 2023-07-27 RX ORDER — NALBUPHINE HYDROCHLORIDE 10 MG/ML
5 INJECTION, SOLUTION INTRAMUSCULAR; INTRAVENOUS; SUBCUTANEOUS EVERY 6 HOURS PRN
Status: DISCONTINUED | OUTPATIENT
Start: 2023-07-27 | End: 2023-07-27

## 2023-07-27 RX ORDER — SODIUM CHLORIDE, SODIUM LACTATE, POTASSIUM CHLORIDE, CALCIUM CHLORIDE 600; 310; 30; 20 MG/100ML; MG/100ML; MG/100ML; MG/100ML
INJECTION, SOLUTION INTRAVENOUS CONTINUOUS
Status: DISCONTINUED | OUTPATIENT
Start: 2023-07-27 | End: 2023-07-30 | Stop reason: HOSPADM

## 2023-07-27 RX ORDER — NALOXONE HYDROCHLORIDE 0.4 MG/ML
INJECTION, SOLUTION INTRAMUSCULAR; INTRAVENOUS; SUBCUTANEOUS PRN
Status: ACTIVE | OUTPATIENT
Start: 2023-07-27 | End: 2023-07-28

## 2023-07-27 RX ORDER — HYDROMORPHONE HYDROCHLORIDE 1 MG/ML
0.5 INJECTION, SOLUTION INTRAMUSCULAR; INTRAVENOUS; SUBCUTANEOUS EVERY 4 HOURS PRN
Status: ACTIVE | OUTPATIENT
Start: 2023-07-27 | End: 2023-07-28

## 2023-07-27 RX ORDER — ONDANSETRON 2 MG/ML
4 INJECTION INTRAMUSCULAR; INTRAVENOUS ONCE
Status: DISCONTINUED | OUTPATIENT
Start: 2023-07-27 | End: 2023-07-27

## 2023-07-27 RX ORDER — ACETAMINOPHEN 500 MG
1000 TABLET ORAL EVERY 8 HOURS SCHEDULED
Status: COMPLETED | OUTPATIENT
Start: 2023-07-27 | End: 2023-07-27

## 2023-07-27 RX ORDER — EPHEDRINE SULFATE/0.9% NACL/PF 50 MG/5 ML
SYRINGE (ML) INTRAVENOUS PRN
Status: DISCONTINUED | OUTPATIENT
Start: 2023-07-27 | End: 2023-07-27 | Stop reason: SDUPTHER

## 2023-07-27 RX ADMIN — ACETAMINOPHEN 1000 MG: 500 TABLET, FILM COATED ORAL at 14:35

## 2023-07-27 RX ADMIN — FAMOTIDINE 20 MG: 20 TABLET, FILM COATED ORAL at 23:02

## 2023-07-27 RX ADMIN — ACETAMINOPHEN 1000 MG: 500 TABLET, FILM COATED ORAL at 22:37

## 2023-07-27 RX ADMIN — ACETAMINOPHEN 1000 MG: 500 TABLET, FILM COATED ORAL at 08:10

## 2023-07-27 RX ADMIN — OXYCODONE HYDROCHLORIDE 5 MG: 5 TABLET ORAL at 09:31

## 2023-07-27 RX ADMIN — DIPHENHYDRAMINE HYDROCHLORIDE 12.5 MG: 50 INJECTION, SOLUTION INTRAMUSCULAR; INTRAVENOUS at 11:50

## 2023-07-27 RX ADMIN — BUPIVACAINE HYDROCHLORIDE IN DEXTROSE 16 MG: 7.5 INJECTION, SOLUTION SUBARACHNOID at 06:26

## 2023-07-27 RX ADMIN — CEFAZOLIN 2000 MG: 10 INJECTION, POWDER, FOR SOLUTION INTRAVENOUS at 06:25

## 2023-07-27 RX ADMIN — SODIUM CITRATE AND CITRIC ACID MONOHYDRATE 30 ML: 500; 334 SOLUTION ORAL at 05:54

## 2023-07-27 RX ADMIN — SODIUM CHLORIDE, POTASSIUM CHLORIDE, SODIUM LACTATE AND CALCIUM CHLORIDE: 600; 310; 30; 20 INJECTION, SOLUTION INTRAVENOUS at 15:24

## 2023-07-27 RX ADMIN — PHENYLEPHRINE HYDROCHLORIDE 50 MCG: 0.1 INJECTION, SOLUTION INTRAVENOUS at 06:49

## 2023-07-27 RX ADMIN — OXYCODONE HYDROCHLORIDE 5 MG: 5 TABLET ORAL at 22:37

## 2023-07-27 RX ADMIN — SODIUM CHLORIDE, SODIUM LACTATE, POTASSIUM CHLORIDE, AND CALCIUM CHLORIDE: 600; 310; 30; 20 INJECTION, SOLUTION INTRAVENOUS at 06:20

## 2023-07-27 RX ADMIN — Medication 15 MG: at 06:35

## 2023-07-27 RX ADMIN — FAMOTIDINE 20 MG: 20 TABLET, FILM COATED ORAL at 05:55

## 2023-07-27 RX ADMIN — KETOROLAC TROMETHAMINE 30 MG: 30 INJECTION, SOLUTION INTRAMUSCULAR; INTRAVENOUS at 13:12

## 2023-07-27 RX ADMIN — Medication 15 MG: at 06:29

## 2023-07-27 RX ADMIN — MORPHINE SULFATE 0.15 MG: 1 INJECTION, SOLUTION EPIDURAL; INTRATHECAL; INTRAVENOUS at 06:26

## 2023-07-27 RX ADMIN — SODIUM CHLORIDE, SODIUM LACTATE, POTASSIUM CHLORIDE, AND CALCIUM CHLORIDE: 600; 310; 30; 20 INJECTION, SOLUTION INTRAVENOUS at 06:52

## 2023-07-27 RX ADMIN — KETOROLAC TROMETHAMINE 30 MG: 30 INJECTION, SOLUTION INTRAMUSCULAR; INTRAVENOUS at 19:50

## 2023-07-27 RX ADMIN — PHENYLEPHRINE HYDROCHLORIDE 100 MCG: 0.1 INJECTION, SOLUTION INTRAVENOUS at 06:33

## 2023-07-27 RX ADMIN — Medication 10 MG: at 06:33

## 2023-07-27 RX ADMIN — Medication 10 MG: at 06:38

## 2023-07-27 RX ADMIN — ONDANSETRON 4 MG: 2 INJECTION INTRAMUSCULAR; INTRAVENOUS at 05:54

## 2023-07-27 RX ADMIN — PHENYLEPHRINE HYDROCHLORIDE 50 MCG: 0.1 INJECTION, SOLUTION INTRAVENOUS at 06:45

## 2023-07-27 RX ADMIN — KETOROLAC TROMETHAMINE 30 MG: 30 INJECTION, SOLUTION INTRAMUSCULAR; INTRAVENOUS at 07:15

## 2023-07-27 RX ADMIN — PHENYLEPHRINE HYDROCHLORIDE 100 MCG: 0.1 INJECTION, SOLUTION INTRAVENOUS at 07:05

## 2023-07-27 RX ADMIN — PROCHLORPERAZINE EDISYLATE 2.5 MG: 5 INJECTION INTRAMUSCULAR; INTRAVENOUS at 07:04

## 2023-07-27 RX ADMIN — ONDANSETRON 4 MG: 2 INJECTION INTRAMUSCULAR; INTRAVENOUS at 07:05

## 2023-07-27 RX ADMIN — AZITHROMYCIN MONOHYDRATE 500 MG: 500 INJECTION, POWDER, LYOPHILIZED, FOR SOLUTION INTRAVENOUS at 07:38

## 2023-07-27 RX ADMIN — PHENYLEPHRINE HYDROCHLORIDE 100 MCG: 0.1 INJECTION, SOLUTION INTRAVENOUS at 06:44

## 2023-07-27 RX ADMIN — DIPHENHYDRAMINE HYDROCHLORIDE 12.5 MG: 50 INJECTION, SOLUTION INTRAMUSCULAR; INTRAVENOUS at 22:38

## 2023-07-27 RX ADMIN — PHENYLEPHRINE HYDROCHLORIDE 100 MCG: 0.1 INJECTION, SOLUTION INTRAVENOUS at 06:29

## 2023-07-27 RX ADMIN — Medication 500 ML/HR: at 06:48

## 2023-07-27 ASSESSMENT — PAIN DESCRIPTION - DESCRIPTORS
DESCRIPTORS: SORE;SHARP
DESCRIPTORS: SORE;SHARP
DESCRIPTORS: CRAMPING
DESCRIPTORS: ACHING;CRAMPING

## 2023-07-27 ASSESSMENT — PAIN DESCRIPTION - LOCATION
LOCATION: ABDOMEN
LOCATION: ABDOMEN
LOCATION: ABDOMEN;INCISION
LOCATION: ABDOMEN;INCISION

## 2023-07-27 ASSESSMENT — PAIN DESCRIPTION - ORIENTATION
ORIENTATION: LOWER;ANTERIOR
ORIENTATION: LOWER;ANTERIOR
ORIENTATION: LOWER

## 2023-07-27 ASSESSMENT — PAIN SCALES - GENERAL
PAINLEVEL_OUTOF10: 6
PAINLEVEL_OUTOF10: 5
PAINLEVEL_OUTOF10: 0
PAINLEVEL_OUTOF10: 5

## 2023-07-27 NOTE — ANESTHESIA PROCEDURE NOTES
Spinal Block    Patient location during procedure: OR  End time: 7/27/2023 6:29 AM  Reason for block: primary anesthetic  Staffing  Performed: anesthesiologist   Anesthesiologist: Juliann Lugo MD  Spinal Block  Patient position: sitting  Prep: ChloraPrep  Patient monitoring: cardiac monitor, continuous pulse ox and frequent blood pressure checks  Approach: midline  Location: L3/L4  Provider prep: mask and sterile gloves  Local infiltration: lidocaine  Needle  Needle type: pencil-tip   Needle gauge: 25 G  Needle length: 3.5 in  Assessment  Events: SAB placement uncomplicated. No paresthesia. Swirl obtained: Yes  CSF: clear  Attempts: 1  Hemodynamics: stable  Additional Notes  3 cc 1% lidocaine local injected at needle insertion site. Risks discussed including damage to muscle or nerve.   Preanesthetic Checklist  Completed: patient identified, IV checked, risks and benefits discussed, equipment checked, pre-op evaluation, timeout performed, anesthesia consent given, oxygen available and monitors applied/VS acknowledged

## 2023-07-27 NOTE — PROGRESS NOTES
1775 Bedside and Verbal shift change report given to TERESSA Mast RN (oncoming nurse) by VASQUEZ Mcdaniel RN (offgoing nurse). Report included the following information Nurse Handoff Report. Relief count completed in OR.   0735 Pt transferred to MIU via bed with TERESSA Mast RN and Alejandro SAWANT in stable condition. RN remains at bedside for first hour of recovery.

## 2023-07-27 NOTE — H&P
History & Physical    Name: Margaret Spears MRN: [id number]  SSN: [id number]    YOB: 1985  Age: 41 y.o. Sex: female      Subjective:     Estimated Date of Delivery: 8/3/23  OB History    Para Term  AB Living   4 2 2   1 2   SAB IAB Ectopic Molar Multiple Live Births   1         2      # Outcome Date GA Lbr Daniel/2nd Weight Sex Delivery Anes PTL Lv   4 Current            3 Term 21 39w1d 06:26 / 00:27 8 lb 7.1 oz (3.83 kg) M Vag-Spont EPI N JOE      Birth CommentsBlas Shone   2 SAB 2019              Birth Comments: D&C   1 Term 10/16/17 39w6d 10:03 / 00:21 7 lb 1.1 oz (3.205 kg) F Vag-Spont  N JOE      Birth Comments: Andshalini       Ms. Mariana Anderson admitted with pregnancy at 39w0d for  section due to macrosomia. Prenatal course was complicated by polyhydramnios. Please see prenatal records for details. Past Medical History:   Diagnosis Date    Anemia     Breast disorder     right breast lump-benign     Past Surgical History:   Procedure Laterality Date    DILATION AND CURETTAGE OF UTERUS      for MAB around 9wks     [address] EXTRACTION       Social History     Occupational History    Not on file   Tobacco Use    Smoking status: Former     Packs/day: 0.25     Types: Cigarettes     Quit date: 2/10/2017     Years since quittin.4    Smokeless tobacco: Never   Vaping Use    Vaping Use: Former   Substance and Sexual Activity    Alcohol use: No    Drug use: No    Sexual activity: Yes     Partners: Male     Birth control/protection: Inserts     Family History   Problem Relation Age of Onset    Hypertension Father     Kidney Disease Father     Diabetes Neg Hx     Colon Cancer Maternal Aunt     Breast Cancer Mother        No Known Allergies  Prior to Admission medications    Medication Sig Start Date End Date Taking?  Authorizing Provider   hydrocortisone (ANUSOL-HC) 2.5 % CREA rectal cream Place rectally as needed for Hemorrhoids 7/3/23   Manju De La Vega MD

## 2023-07-27 NOTE — LACTATION NOTE

## 2023-07-27 NOTE — PROGRESS NOTES
Telephoned Dr. Amelia Gaspar anesthesiologist regarding patient's itching. Order received for Benadryl 12.5mg Q6 PRN, see MAR for admin.

## 2023-07-27 NOTE — LACTATION NOTE
This note was copied from a baby's chart. In to see mom and infant for the first time. Experienced mom stated that infant has been latching and nursing well. She has been pumping prior to delivery with a manual pump and stated that she thought the flange was too big. She has been using a 24 mm. I measured her nipple with measurement of 22 mm. Informed her that it could be too snug for her and she is going to use a 27 mm during the night to see if it feels better when pumping. Reviewed expectations of the first 24 hours. Lactation consultant will follow up tomorrow.

## 2023-07-27 NOTE — PROGRESS NOTES
Pt admitted to room 422 for primary c/s. Iv placed; labs sent; EFM and TOCO placed on soft nontender abd. Pt c/o painful ctx. SVE performed. Pt 6-7 cm. Dr. Frannie Shook informed. MD on way to perform c/s.

## 2023-07-28 ENCOUNTER — ANESTHESIA EVENT (OUTPATIENT)
Dept: MOTHER INFANT UNIT | Age: 38
End: 2023-07-28

## 2023-07-28 ENCOUNTER — ANESTHESIA (OUTPATIENT)
Dept: MOTHER INFANT UNIT | Age: 38
End: 2023-07-28

## 2023-07-28 LAB
ERYTHROCYTE [DISTWIDTH] IN BLOOD BY AUTOMATED COUNT: 13.9 % (ref 11.9–14.6)
HCT VFR BLD AUTO: 31.8 % (ref 35.8–46.3)
HGB BLD-MCNC: 10.2 G/DL (ref 11.7–15.4)
MCH RBC QN AUTO: 28.2 PG (ref 26.1–32.9)
MCHC RBC AUTO-ENTMCNC: 32.1 G/DL (ref 31.4–35)
MCV RBC AUTO: 87.8 FL (ref 82–102)
NRBC # BLD: 0 K/UL (ref 0–0.2)
PLATELET # BLD AUTO: 97 K/UL (ref 150–450)
PMV BLD AUTO: 12.8 FL (ref 9.4–12.3)
RBC # BLD AUTO: 3.62 M/UL (ref 4.05–5.2)
WBC # BLD AUTO: 7.3 K/UL (ref 4.3–11.1)

## 2023-07-28 PROCEDURE — 1100000000 HC RM PRIVATE

## 2023-07-28 PROCEDURE — 6360000002 HC RX W HCPCS: Performed by: OBSTETRICS & GYNECOLOGY

## 2023-07-28 PROCEDURE — 85027 COMPLETE CBC AUTOMATED: CPT

## 2023-07-28 PROCEDURE — 6370000000 HC RX 637 (ALT 250 FOR IP): Performed by: STUDENT IN AN ORGANIZED HEALTH CARE EDUCATION/TRAINING PROGRAM

## 2023-07-28 PROCEDURE — 36415 COLL VENOUS BLD VENIPUNCTURE: CPT

## 2023-07-28 PROCEDURE — 6370000000 HC RX 637 (ALT 250 FOR IP): Performed by: OBSTETRICS & GYNECOLOGY

## 2023-07-28 RX ORDER — IBUPROFEN 800 MG/1
800 TABLET ORAL EVERY 6 HOURS
Status: DISCONTINUED | OUTPATIENT
Start: 2023-07-28 | End: 2023-07-30 | Stop reason: HOSPADM

## 2023-07-28 RX ORDER — IBUPROFEN 800 MG/1
800 TABLET ORAL EVERY 6 HOURS
Status: DISCONTINUED | OUTPATIENT
Start: 2023-07-28 | End: 2023-07-28

## 2023-07-28 RX ORDER — ONDANSETRON 8 MG/1
8 TABLET, ORALLY DISINTEGRATING ORAL EVERY 8 HOURS PRN
Status: DISCONTINUED | OUTPATIENT
Start: 2023-07-28 | End: 2023-07-30 | Stop reason: HOSPADM

## 2023-07-28 RX ORDER — DIPHENHYDRAMINE HYDROCHLORIDE 50 MG/ML
25 INJECTION INTRAMUSCULAR; INTRAVENOUS EVERY 6 HOURS PRN
Status: DISCONTINUED | OUTPATIENT
Start: 2023-07-28 | End: 2023-07-30 | Stop reason: HOSPADM

## 2023-07-28 RX ORDER — DOCUSATE SODIUM 100 MG/1
100 CAPSULE, LIQUID FILLED ORAL 2 TIMES DAILY
Status: DISCONTINUED | OUTPATIENT
Start: 2023-07-28 | End: 2023-07-30 | Stop reason: HOSPADM

## 2023-07-28 RX ORDER — LANOLIN
CREAM (ML) TOPICAL
Status: DISCONTINUED | OUTPATIENT
Start: 2023-07-28 | End: 2023-07-30 | Stop reason: HOSPADM

## 2023-07-28 RX ORDER — SIMETHICONE 80 MG
80 TABLET,CHEWABLE ORAL EVERY 6 HOURS PRN
Status: DISCONTINUED | OUTPATIENT
Start: 2023-07-28 | End: 2023-07-30 | Stop reason: HOSPADM

## 2023-07-28 RX ORDER — ACETAMINOPHEN 500 MG
1000 TABLET ORAL EVERY 6 HOURS PRN
Status: DISCONTINUED | OUTPATIENT
Start: 2023-07-28 | End: 2023-07-30 | Stop reason: HOSPADM

## 2023-07-28 RX ORDER — TRANEXAMIC ACID 10 MG/ML
1000 INJECTION, SOLUTION INTRAVENOUS
Status: ACTIVE | OUTPATIENT
Start: 2023-07-28 | End: 2023-07-29

## 2023-07-28 RX ORDER — SODIUM CHLORIDE 0.9 % (FLUSH) 0.9 %
5-40 SYRINGE (ML) INJECTION PRN
Status: DISCONTINUED | OUTPATIENT
Start: 2023-07-28 | End: 2023-07-30 | Stop reason: HOSPADM

## 2023-07-28 RX ORDER — SODIUM CHLORIDE 9 MG/ML
INJECTION, SOLUTION INTRAVENOUS PRN
Status: DISCONTINUED | OUTPATIENT
Start: 2023-07-28 | End: 2023-07-30 | Stop reason: HOSPADM

## 2023-07-28 RX ORDER — OXYCODONE HYDROCHLORIDE 5 MG/1
5 TABLET ORAL EVERY 4 HOURS PRN
Status: DISCONTINUED | OUTPATIENT
Start: 2023-07-28 | End: 2023-07-30 | Stop reason: HOSPADM

## 2023-07-28 RX ORDER — SODIUM CHLORIDE 0.9 % (FLUSH) 0.9 %
5-40 SYRINGE (ML) INJECTION EVERY 12 HOURS SCHEDULED
Status: DISCONTINUED | OUTPATIENT
Start: 2023-07-28 | End: 2023-07-30 | Stop reason: HOSPADM

## 2023-07-28 RX ORDER — SODIUM CHLORIDE, SODIUM LACTATE, POTASSIUM CHLORIDE, CALCIUM CHLORIDE 600; 310; 30; 20 MG/100ML; MG/100ML; MG/100ML; MG/100ML
INJECTION, SOLUTION INTRAVENOUS CONTINUOUS
Status: DISCONTINUED | OUTPATIENT
Start: 2023-07-28 | End: 2023-07-30 | Stop reason: HOSPADM

## 2023-07-28 RX ORDER — NALOXONE HYDROCHLORIDE 0.4 MG/ML
0.4 INJECTION, SOLUTION INTRAMUSCULAR; INTRAVENOUS; SUBCUTANEOUS PRN
Status: DISCONTINUED | OUTPATIENT
Start: 2023-07-28 | End: 2023-07-30 | Stop reason: HOSPADM

## 2023-07-28 RX ORDER — OXYCODONE HYDROCHLORIDE 5 MG/1
10 TABLET ORAL EVERY 4 HOURS PRN
Status: DISCONTINUED | OUTPATIENT
Start: 2023-07-28 | End: 2023-07-30 | Stop reason: HOSPADM

## 2023-07-28 RX ORDER — POLYETHYLENE GLYCOL 3350 17 G/17G
17 POWDER, FOR SOLUTION ORAL DAILY
Status: DISCONTINUED | OUTPATIENT
Start: 2023-07-28 | End: 2023-07-30 | Stop reason: HOSPADM

## 2023-07-28 RX ADMIN — ACETAMINOPHEN 1000 MG: 500 TABLET, FILM COATED ORAL at 17:46

## 2023-07-28 RX ADMIN — SIMETHICONE 80 MG: 80 TABLET, CHEWABLE ORAL at 06:20

## 2023-07-28 RX ADMIN — OXYCODONE HYDROCHLORIDE 10 MG: 5 TABLET ORAL at 17:46

## 2023-07-28 RX ADMIN — IBUPROFEN 800 MG: 800 TABLET ORAL at 15:50

## 2023-07-28 RX ADMIN — FAMOTIDINE 20 MG: 20 TABLET, FILM COATED ORAL at 08:05

## 2023-07-28 RX ADMIN — OXYCODONE HYDROCHLORIDE 5 MG: 5 TABLET ORAL at 02:36

## 2023-07-28 RX ADMIN — OXYCODONE HYDROCHLORIDE 5 MG: 5 TABLET ORAL at 13:30

## 2023-07-28 RX ADMIN — OXYCODONE HYDROCHLORIDE 5 MG: 5 TABLET ORAL at 12:40

## 2023-07-28 RX ADMIN — ACETAMINOPHEN 1000 MG: 500 TABLET, FILM COATED ORAL at 06:12

## 2023-07-28 RX ADMIN — DIPHENHYDRAMINE HYDROCHLORIDE 25 MG: 50 INJECTION, SOLUTION INTRAMUSCULAR; INTRAVENOUS at 06:15

## 2023-07-28 RX ADMIN — ACETAMINOPHEN 1000 MG: 500 TABLET, FILM COATED ORAL at 12:40

## 2023-07-28 RX ADMIN — OXYCODONE HYDROCHLORIDE 10 MG: 5 TABLET ORAL at 22:03

## 2023-07-28 RX ADMIN — DOCUSATE SODIUM 100 MG: 100 CAPSULE, LIQUID FILLED ORAL at 08:05

## 2023-07-28 RX ADMIN — KETOROLAC TROMETHAMINE 30 MG: 30 INJECTION, SOLUTION INTRAMUSCULAR; INTRAVENOUS at 01:43

## 2023-07-28 RX ADMIN — DOCUSATE SODIUM 100 MG: 100 CAPSULE, LIQUID FILLED ORAL at 20:14

## 2023-07-28 RX ADMIN — ACETAMINOPHEN 1000 MG: 500 TABLET, FILM COATED ORAL at 23:58

## 2023-07-28 RX ADMIN — OXYCODONE HYDROCHLORIDE 5 MG: 5 TABLET ORAL at 08:05

## 2023-07-28 RX ADMIN — IBUPROFEN 800 MG: 800 TABLET ORAL at 08:05

## 2023-07-28 RX ADMIN — FAMOTIDINE 20 MG: 20 TABLET, FILM COATED ORAL at 20:14

## 2023-07-28 RX ADMIN — IBUPROFEN 800 MG: 800 TABLET ORAL at 22:02

## 2023-07-28 ASSESSMENT — PAIN DESCRIPTION - ORIENTATION
ORIENTATION: LOWER;ANTERIOR
ORIENTATION: LOWER

## 2023-07-28 ASSESSMENT — PAIN DESCRIPTION - DESCRIPTORS
DESCRIPTORS: SORE
DESCRIPTORS: SORE
DESCRIPTORS: SORE;SHARP
DESCRIPTORS: SORE

## 2023-07-28 ASSESSMENT — PAIN DESCRIPTION - LOCATION
LOCATION: ABDOMEN;INCISION

## 2023-07-28 ASSESSMENT — PAIN SCALES - GENERAL
PAINLEVEL_OUTOF10: 6
PAINLEVEL_OUTOF10: 5
PAINLEVEL_OUTOF10: 6

## 2023-07-28 NOTE — CARE COORDINATION
Chart reviewed - anxiety. SW met with patient/ to complete initial assessment. Patient states that she experienced postpartum depression after having her second baby (). Patient reports difficulty with breastfeeding, which may have been a contributing factor. Patient was subsequently placed on Lorazepam, but she found this medication caused her to become too sleepy. She then began working on identifying her triggers, exercising, and increasing her support system. Patient has her  and mother for support postpartum. Discussed importance of ongoing self-care, exercise, and utilization of support system. Family agreeable for  to call next week to check-in. Patient given informational packet on  mood & anxiety disorders (resources/education). Family denies any additional needs from  at this time. Family has 's contact information should any needs/questions arise.     FABY Bey, [address] [facility]   [phone]

## 2023-07-28 NOTE — LACTATION NOTE
This note was copied from a baby's chart. In to follow up with mom and infant. Mom stated that infant is latching and nursing well. Did discuss positioning to get infant to open mouth wider to get a deeper latch. Also reviewed the second night of life. Lactation consultant will follow up tomorrow.

## 2023-07-28 NOTE — PLAN OF CARE
Problem: Vaginal Birth or  Section  Goal: Fetal and maternal status remain reassuring during the birth process  Description:  Birth OB-Pregnancy care plan goal which identifies if the fetal and maternal status remain reassuring during the birth process  2023 by Sussy Zuniga RN  Outcome: Completed  2023 by Alexandra Layton RN  Outcome: Progressing     Problem: Postpartum  Goal: Experiences normal postpartum course  Description:  Postpartum OB-Pregnancy care plan goal which identifies if the mother is experiencing a normal postpartum course  2023 09 by Sussy Zuniga RN  Outcome: Progressing  2023 by Alexandra Layton RN  Outcome: Progressing  Goal: Appropriate maternal -  bonding  Description:  Postpartum OB-Pregnancy care plan goal which identifies if the mother and  are bonding appropriately  2023 by Sussy Zuniga RN  Outcome: Progressing  2023 by Alexandra Layton RN  Outcome: Progressing  Goal: Establishment of infant feeding pattern  Description:  Postpartum OB-Pregnancy care plan goal which identifies if the mother is establishing a feeding pattern with their   2023 4102 by Sussy Zuniga RN  Outcome: Progressing  2023 by Alexandra Layton RN  Outcome: Progressing  Goal: Incisions, wounds, or drain sites healing without S/S of infection  2023 by Sussy Zuniga RN  Outcome: Progressing  Flowsheets (Taken 2023 0805)  Incisions, Wounds, or Drain Sites Healing Without Sign and Symptoms of Infection:   TWICE DAILY: Assess and document dressing/incision, wound bed, drain sites and surrounding tissue   Implement wound care per orders  2023 by Alexandra Layton RN  Outcome: Progressing     Problem: Pain  Goal: Verbalizes/displays adequate comfort level or baseline comfort level  2023 by Sussy Zuniga RN  Outcome: Progressing  Flowsheets (Taken 2023

## 2023-07-29 LAB
BASOPHILS # BLD: 0.1 K/UL (ref 0–0.2)
BASOPHILS NFR BLD: 1 % (ref 0–2)
DIFFERENTIAL METHOD BLD: ABNORMAL
EOSINOPHIL # BLD: 0.1 K/UL (ref 0–0.8)
EOSINOPHIL NFR BLD: 2 % (ref 0.5–7.8)
ERYTHROCYTE [DISTWIDTH] IN BLOOD BY AUTOMATED COUNT: 14 % (ref 11.9–14.6)
HCT VFR BLD AUTO: 31.7 % (ref 35.8–46.3)
HGB BLD-MCNC: 10.2 G/DL (ref 11.7–15.4)
IMM GRANULOCYTES # BLD AUTO: 0.1 K/UL (ref 0–0.5)
IMM GRANULOCYTES NFR BLD AUTO: 2 % (ref 0–5)
LYMPHOCYTES # BLD: 2.2 K/UL (ref 0.5–4.6)
LYMPHOCYTES NFR BLD: 29 % (ref 13–44)
MCH RBC QN AUTO: 28.2 PG (ref 26.1–32.9)
MCHC RBC AUTO-ENTMCNC: 32.2 G/DL (ref 31.4–35)
MCV RBC AUTO: 87.6 FL (ref 82–102)
MONOCYTES # BLD: 0.6 K/UL (ref 0.1–1.3)
MONOCYTES NFR BLD: 8 % (ref 4–12)
NEUTS SEG # BLD: 4.4 K/UL (ref 1.7–8.2)
NEUTS SEG NFR BLD: 59 % (ref 43–78)
NRBC # BLD: 0 K/UL (ref 0–0.2)
PLATELET # BLD AUTO: 109 K/UL (ref 150–450)
PMV BLD AUTO: 12.2 FL (ref 9.4–12.3)
RBC # BLD AUTO: 3.62 M/UL (ref 4.05–5.2)
WBC # BLD AUTO: 7.5 K/UL (ref 4.3–11.1)

## 2023-07-29 PROCEDURE — 1100000000 HC RM PRIVATE

## 2023-07-29 PROCEDURE — 6370000000 HC RX 637 (ALT 250 FOR IP): Performed by: OBSTETRICS & GYNECOLOGY

## 2023-07-29 PROCEDURE — 36415 COLL VENOUS BLD VENIPUNCTURE: CPT

## 2023-07-29 PROCEDURE — 85025 COMPLETE CBC W/AUTO DIFF WBC: CPT

## 2023-07-29 RX ADMIN — POLYETHYLENE GLYCOL 3350 17 G: 17 POWDER, FOR SOLUTION ORAL at 08:04

## 2023-07-29 RX ADMIN — OXYCODONE HYDROCHLORIDE 10 MG: 5 TABLET ORAL at 08:04

## 2023-07-29 RX ADMIN — OXYCODONE HYDROCHLORIDE 10 MG: 5 TABLET ORAL at 15:46

## 2023-07-29 RX ADMIN — FAMOTIDINE 20 MG: 20 TABLET, FILM COATED ORAL at 08:04

## 2023-07-29 RX ADMIN — ACETAMINOPHEN 1000 MG: 500 TABLET, FILM COATED ORAL at 22:33

## 2023-07-29 RX ADMIN — IBUPROFEN 800 MG: 800 TABLET ORAL at 10:43

## 2023-07-29 RX ADMIN — OXYCODONE HYDROCHLORIDE 10 MG: 5 TABLET ORAL at 11:47

## 2023-07-29 RX ADMIN — IBUPROFEN 800 MG: 800 TABLET ORAL at 17:47

## 2023-07-29 RX ADMIN — OXYCODONE HYDROCHLORIDE 10 MG: 5 TABLET ORAL at 04:00

## 2023-07-29 RX ADMIN — DOCUSATE SODIUM 100 MG: 100 CAPSULE, LIQUID FILLED ORAL at 08:04

## 2023-07-29 RX ADMIN — IBUPROFEN 800 MG: 800 TABLET ORAL at 04:00

## 2023-07-29 RX ADMIN — ACETAMINOPHEN 1000 MG: 500 TABLET, FILM COATED ORAL at 15:46

## 2023-07-29 ASSESSMENT — PAIN SCALES - GENERAL
PAINLEVEL_OUTOF10: 6
PAINLEVEL_OUTOF10: 7

## 2023-07-29 ASSESSMENT — PAIN DESCRIPTION - LOCATION: LOCATION: ABDOMEN;INCISION

## 2023-07-29 ASSESSMENT — PAIN DESCRIPTION - ORIENTATION: ORIENTATION: LOWER

## 2023-07-29 ASSESSMENT — PAIN DESCRIPTION - DESCRIPTORS: DESCRIPTORS: SORE

## 2023-07-29 NOTE — LACTATION NOTE
This note was copied from a baby's chart. Lactation follow up visit. Doing well. Working on latching. Mom states baby is sometimes not opening mouth well but mom is working with baby and then baby will latch. Did prenatal pumping and is offering back some expressed colostrum at a few feeds per day. Mom confident. Offered help as needed. Keep feeding on demand, wake as needed.  Offer extra milk as needed based on feeding success

## 2023-07-30 VITALS
SYSTOLIC BLOOD PRESSURE: 112 MMHG | TEMPERATURE: 97.9 F | HEART RATE: 94 BPM | DIASTOLIC BLOOD PRESSURE: 85 MMHG | OXYGEN SATURATION: 97 % | RESPIRATION RATE: 16 BRPM

## 2023-07-30 PROCEDURE — 6370000000 HC RX 637 (ALT 250 FOR IP): Performed by: OBSTETRICS & GYNECOLOGY

## 2023-07-30 RX ORDER — OXYCODONE HYDROCHLORIDE 5 MG/1
5 TABLET ORAL EVERY 4 HOURS PRN
Qty: 15 TABLET | Refills: 0 | Status: SHIPPED | OUTPATIENT
Start: 2023-07-30 | End: 2023-08-04

## 2023-07-30 RX ORDER — IBUPROFEN 800 MG/1
800 TABLET ORAL EVERY 8 HOURS PRN
Qty: 60 TABLET | Refills: 0 | Status: SHIPPED | OUTPATIENT
Start: 2023-07-30

## 2023-07-30 RX ADMIN — IBUPROFEN 800 MG: 800 TABLET ORAL at 09:28

## 2023-07-30 RX ADMIN — IBUPROFEN 800 MG: 800 TABLET ORAL at 00:35

## 2023-07-30 RX ADMIN — OXYCODONE HYDROCHLORIDE 5 MG: 5 TABLET ORAL at 00:36

## 2023-07-30 RX ADMIN — DOCUSATE SODIUM 100 MG: 100 CAPSULE, LIQUID FILLED ORAL at 09:28

## 2023-07-30 RX ADMIN — FAMOTIDINE 20 MG: 20 TABLET, FILM COATED ORAL at 09:27

## 2023-07-30 RX ADMIN — POLYETHYLENE GLYCOL 3350 17 G: 17 POWDER, FOR SOLUTION ORAL at 09:28

## 2023-07-30 RX ADMIN — OXYCODONE HYDROCHLORIDE 10 MG: 5 TABLET ORAL at 11:31

## 2023-07-30 RX ADMIN — ACETAMINOPHEN 1000 MG: 500 TABLET, FILM COATED ORAL at 11:31

## 2023-07-30 RX ADMIN — ACETAMINOPHEN 1000 MG: 500 TABLET, FILM COATED ORAL at 06:03

## 2023-07-30 RX ADMIN — DOCUSATE SODIUM 100 MG: 100 CAPSULE, LIQUID FILLED ORAL at 00:37

## 2023-07-30 ASSESSMENT — PAIN DESCRIPTION - DESCRIPTORS: DESCRIPTORS: BURNING

## 2023-07-30 ASSESSMENT — PAIN SCALES - GENERAL
PAINLEVEL_OUTOF10: 6
PAINLEVEL_OUTOF10: 6

## 2023-07-30 ASSESSMENT — PAIN DESCRIPTION - LOCATION: LOCATION: ABDOMEN;INCISION

## 2023-07-30 ASSESSMENT — PAIN - FUNCTIONAL ASSESSMENT: PAIN_FUNCTIONAL_ASSESSMENT: ACTIVITIES ARE NOT PREVENTED

## 2023-07-30 NOTE — DISCHARGE SUMMARY
Obstetrical Discharge Summary     Name: Kell Engle MRN: [id number]  SSN: [id number]    YOB: 1985  Age: 41 y.o. Sex: female      Allergies: Patient has no known allergies. Admit Date: 2023    Discharge Date: 2023     Admitting Physician: Allegra Delong MD     Attending Physician:  Allegra Delong MD     * Admission Diagnoses: S/P  [L79.387]  44 weeks gestation of pregnancy [Z3A.39]    * Discharge Diagnoses: S/P  [Z98.891]  39 weeks gestation of pregnancy [Z3A.39]              Additional Diagnoses:   Hospital Problems             Last Modified POA    * (Principal) 39 weeks gestation of pregnancy 2023 Yes    Encounter for supervision of low-risk pregnancy, antepartum 2023 Yes    Overview Addendum 2023 11:17 AM by Mario Turner DO     OB hx:  G1:   10/16/17 w/ Dr Isabella Basurto at 39w6d. Baby 7#1. 1oz   G2: MAB at 9wks --suction D&C 10/30/2019  G3:  by KISHAN         Thrombocytopenia affecting pregnancy ([address]) 2023 Yes    Overview Addendum 2023  9:39 AM by SANTY Russell - JAVI     Thrombocytopenia:  Likely gestational   Plts 113K (21)   Plts 265K (22)  Plts 127K (23)  Plts 141K (23)  Plts 131K (7/3/23)           AMA (advanced maternal age) multigravida 33+, second trimester 2023 Yes    Overview Addendum 2023  5:29 PM by Na Snyder MD     - 2023 34w4d accelerated growth, 80%, AC=99%  - repeat 37-38 wks _______________________            delivery delivered 2023 Yes        All lab results for the last 24 hours reviewed.   CBC:    Recent Labs     23  0735 23  0715   WBC 7.3 7.5   HGB 10.2* 10.2*   HCT 31.8* 31.7*   PLT 97* 109*        Immunizations:    Immunization History   Administered Date(s) Administered    Influenza Virus Vaccine 2017, 10/12/2020    Influenza, FLUARIX, FLULAVAL, FLUZONE (age 10 mo+) AND AFLURIA, (age 1 y+), PF, 0.5mL 10/12/2020    Influenza,

## 2023-07-30 NOTE — LACTATION NOTE
This note was copied from a baby's chart. Mom is concerned about milk coming in and developing mastitis. Last baby really struggled to learn to nurse and mom then struggled to reduce supply and dry-up. Mom has decided to stop nursing and pumping at this time. Last pumped 1 oz. Encouraged supportive bra, no stimulation unless briefly for relief, no heat, use cold and continue ibuprofen as ordered. Call as needed.

## 2023-07-30 NOTE — DISCHARGE INSTRUCTIONS
Section: What to Expect at [address] [place] [address]     A  section, or , is surgery to deliver your baby through a cut that the doctor makes in your lower belly and uterus. The cut is called an incision. You may have some pain in your lower belly and need pain medicine for 1 to 2 weeks. You can expect some vaginal bleeding for several weeks. You will probably need about 6 weeks to fully recover. It's important to take it easy while the incision heals. Avoid heavy lifting, strenuous activities, and exercises that strain the belly muscles while you recover. Ask a family member or friend for help with housework, cooking, and shopping. This care sheet gives you a general idea about how long it will take for you to recover. But each person recovers at a different pace. Follow the steps below to get better as quickly as possible. How can you care for yourself at home? Activity    Rest when you feel tired. Getting enough sleep will help you recover. Try to walk each day. Start by walking a little more than you did the day before. Bit by bit, increase the amount you walk. Walking boosts blood flow and helps prevent pneumonia, constipation, and blood clots. Avoid strenuous activities, such as bicycle riding, jogging, weightlifting, and aerobic exercise, for 6 weeks or until your doctor says it is okay. Until your doctor says it is okay, do not lift anything heavier than your baby. Do not do sit-ups or other exercises that strain the belly muscles for 6 weeks or until your doctor says it is okay. Hold a pillow over your incision when you cough or take deep breaths. This will support your belly and decrease your pain. You may shower as usual. Pat the incision dry when you are done. You will have some vaginal bleeding. Wear sanitary pads. Do not douche or use tampons until your doctor says it is okay. Ask your doctor when you can drive again.      You will probably need

## 2023-08-04 ENCOUNTER — FOLLOWUP TELEPHONE ENCOUNTER (OUTPATIENT)
Dept: CASE MANAGEMENT | Age: 38
End: 2023-08-04

## 2023-08-04 NOTE — TELEPHONE ENCOUNTER
Phone call to patient at [phone] due to history of postpartum depression. Per patient, [employer] [place] transition was tough for the first couple of days, but it's going to get better. \"      Patient continues to have the support of her  and mother postpartum. Patient denies any needs at this time and is agreeable to receiving another phone call in the future. Additionally, patient was provided my contact information and encouraged to reach out with any needs/questions.     Gio Schaeffer, CAITLIN-BECCA, [address] [facility]   [phone]

## 2023-08-15 ENCOUNTER — POSTPARTUM VISIT (OUTPATIENT)
Dept: OBGYN CLINIC | Age: 38
End: 2023-08-15

## 2023-08-15 VITALS
HEIGHT: 64 IN | DIASTOLIC BLOOD PRESSURE: 78 MMHG | BODY MASS INDEX: 28.51 KG/M2 | SYSTOLIC BLOOD PRESSURE: 110 MMHG | WEIGHT: 167 LBS

## 2023-08-15 DIAGNOSIS — Z98.891 S/P CESAREAN SECTION: ICD-10-CM

## 2023-08-15 NOTE — PROGRESS NOTES
up.  Return in about 4 weeks (around 9/12/2023), or if symptoms worsen or fail to improve, for Postpartum Appt.

## 2023-08-18 ENCOUNTER — FOLLOWUP TELEPHONE ENCOUNTER (OUTPATIENT)
Dept: CASE MANAGEMENT | Age: 38
End: 2023-08-18

## 2023-08-18 NOTE — TELEPHONE ENCOUNTER
Phone call to patient at [phone] due to history of postpartum depression. No answer; message left requesting call back.      FABY Harden, [address] [facility]   [phone]

## 2023-08-28 ENCOUNTER — FOLLOWUP TELEPHONE ENCOUNTER (OUTPATIENT)
Dept: CASE MANAGEMENT | Age: 38
End: 2023-08-28

## 2023-08-28 NOTE — TELEPHONE ENCOUNTER
Phone call to patient at [phone] due to history of postpartum depression. No answer; message left requesting call back.      FABY Juarez, [facility]   [phone]

## 2023-09-12 ENCOUNTER — POSTPARTUM VISIT (OUTPATIENT)
Dept: OBGYN CLINIC | Age: 38
End: 2023-09-12

## 2023-09-12 VITALS
WEIGHT: 164 LBS | HEIGHT: 64 IN | BODY MASS INDEX: 28 KG/M2 | DIASTOLIC BLOOD PRESSURE: 78 MMHG | SYSTOLIC BLOOD PRESSURE: 110 MMHG

## 2023-09-12 DIAGNOSIS — R10.2 PELVIC CRAMPING: ICD-10-CM

## 2023-09-12 DIAGNOSIS — R10.2 PELVIC PAIN: ICD-10-CM

## 2023-09-12 PROCEDURE — 99902 PR PRENATAL VISIT: CPT | Performed by: OBSTETRICS & GYNECOLOGY

## 2023-09-13 RX ORDER — ACETAMINOPHEN AND CODEINE PHOSPHATE 120; 12 MG/5ML; MG/5ML
1 SOLUTION ORAL DAILY
Qty: 90 TABLET | Refills: 4 | Status: SHIPPED | OUTPATIENT
Start: 2023-09-13

## 2023-09-15 DIAGNOSIS — Z30.018 ENCOUNTER FOR INITIAL PRESCRIPTION OF OTHER CONTRACEPTIVES: Primary | ICD-10-CM

## 2023-09-15 RX ORDER — LACTIC ACID, L-, CITRIC ACID MONOHYDRATE, AND POTASSIUM BITARTRATE 90; 50; 20 MG/5G; MG/5G; MG/5G
1 GEL VAGINAL NIGHTLY PRN
Qty: 5 G | Refills: 12 | Status: SHIPPED | OUTPATIENT
Start: 2023-09-15

## 2023-09-27 ENCOUNTER — PREP FOR PROCEDURE (OUTPATIENT)
Dept: OBGYN CLINIC | Age: 38
End: 2023-09-27

## 2023-09-27 DIAGNOSIS — R25.2 POSTPARTUM CRAMPS: ICD-10-CM

## 2023-09-27 DIAGNOSIS — R10.2 PAIN IN FEMALE PELVIS: ICD-10-CM

## 2023-10-11 ENCOUNTER — CLINICAL DOCUMENTATION (OUTPATIENT)
Dept: OBGYN CLINIC | Age: 38
End: 2023-10-11

## 2023-10-11 NOTE — PROGRESS NOTES
Pt sent [employer] message to cancel surgery. Surgery and all appts leading up to surgery have been cancelled.

## 2023-12-05 ENCOUNTER — TELEPHONE (OUTPATIENT)
Dept: OBGYN CLINIC | Age: 38
End: 2023-12-05

## 2023-12-05 NOTE — TELEPHONE ENCOUNTER
Manuel Negron representative called in regards to Phexxi rx needing a PA. Rx had 11 refills that are not approving.

## 2023-12-07 ENCOUNTER — PATIENT MESSAGE (OUTPATIENT)
Dept: OBGYN CLINIC | Age: 38
End: 2023-12-07

## 2023-12-28 ENCOUNTER — OFFICE VISIT (OUTPATIENT)
Dept: OBGYN CLINIC | Age: 38
End: 2023-12-28
Payer: MEDICAID

## 2023-12-28 VITALS
WEIGHT: 158 LBS | BODY MASS INDEX: 26.98 KG/M2 | HEIGHT: 64 IN | SYSTOLIC BLOOD PRESSURE: 102 MMHG | DIASTOLIC BLOOD PRESSURE: 62 MMHG

## 2023-12-28 DIAGNOSIS — Z01.419 WELL WOMAN EXAM WITH ROUTINE GYNECOLOGICAL EXAM: Primary | ICD-10-CM

## 2023-12-28 DIAGNOSIS — R61 NIGHT SWEATS: ICD-10-CM

## 2023-12-28 DIAGNOSIS — Z12.4 SCREENING FOR CERVICAL CANCER: ICD-10-CM

## 2023-12-28 DIAGNOSIS — Z11.51 SCREENING FOR HUMAN PAPILLOMAVIRUS (HPV): ICD-10-CM

## 2023-12-28 LAB
T4 FREE SERPL-MCNC: 0.9 NG/DL (ref 0.78–1.46)
TSH, 3RD GENERATION: 1.3 UIU/ML (ref 0.36–3.74)

## 2023-12-28 PROCEDURE — 99395 PREV VISIT EST AGE 18-39: CPT | Performed by: OBSTETRICS & GYNECOLOGY

## 2023-12-28 RX ORDER — LACTIC ACID, L-, CITRIC ACID MONOHYDRATE, AND POTASSIUM BITARTRATE 90; 50; 20 MG/5G; MG/5G; MG/5G
GEL VAGINAL
COMMUNITY

## 2023-12-28 NOTE — PROGRESS NOTES
Patient presents today for   Chief Complaint   Patient presents with    Annual Exam     Pt c/o night sweats at night, heavy periods, and acne around butt area         Monthly menses w/ heavy flow.  LMP: Patient's last menstrual period was 2023.  Contraception:  Phexxi vaginal film        No Known Allergies  Current Outpatient Medications   Medication Sig Dispense Refill    Lactic Ac-Citric Ac-Pot Bitart (PHEXXI) 1.8-1-0.4 % GEL Place vaginally       No current facility-administered medications for this visit.     Past Medical History:   Diagnosis Date    Anemia     Breast disorder     right breast lump-benign     Past Surgical History:   Procedure Laterality Date     SECTION N/A 2023     SE[place] [facility]    DILATION AND CURETTAGE OF UTERUS      for MAB around 9wks     REFRACTIVE SURGERY      WISDOM TOOTH EXTRACTION       Social History     Socioeconomic History    Marital status:      Spouse name: Not on file    Number of children: Not on file    Years of education: Not on file    Highest education level: Not on file   Occupational History    Not on file   Tobacco Use    Smoking status: Former     Current packs/day: 0.00     Types: Cigarettes     Quit date: 2/10/2017     Years since quittin.9     Passive exposure: Never    Smokeless tobacco: Never   Vaping Use    Vaping Use: Former   Substance and Sexual Activity    Alcohol use: No    Drug use: No    Sexual activity: Yes     Partners: Male     Birth control/protection: Inserts   Other Topics Concern    Not on file   Social History Narrative    Not on file     Social Determinants of Health     Financial Resource Strain: Not on file   Food Insecurity: Not on file (3/13/2023)   Transportation Needs: Not on file   Physical Activity: Not on file   Stress: Not on file   Social Connections: Not on file   Intimate Partner Violence: Not on file   Housing Stability: Not on file     Family History

## 2024-01-03 LAB
COLLECTION METHOD: NORMAL
CYTOLOGIST CVX/VAG CYTO: NORMAL
CYTOLOGY CVX/VAG DOC THIN PREP: NORMAL
DATE OF LMP: NORMAL
HPV APTIMA: NEGATIVE
HPV GENOTYPE REFLEX: NORMAL
Lab: NORMAL
OTHER PT INFO: NORMAL
PAP SOURCE: NORMAL
PATH REPORT.FINAL DX SPEC: NORMAL
PREV CYTO INFO: NEGATIVE
PREV TREATMENT RESULTS: NORMAL
PREV TREATMENT: NORMAL
STAT OF ADQ CVX/VAG CYTO-IMP: NORMAL

## 2024-05-10 NOTE — H&P
Sig Start Date End Date Taking? Authorizing Provider   hydrocortisone (ANUSOL-HC) 2.5 % CREA rectal cream Place rectally as needed for Hemorrhoids 7/3/23   Shiva Waldron MD   hydrocortisone (ANUSOL-HC) 25 MG suppository Place 1 suppository rectally 2 times daily as needed for Hemorrhoids 5/11/23   Mary Toledo MD   Docusate Sodium (COLACE PO) Take by mouth    Historical Provider, MD   calcium carbonate (TUMS) 500 MG chewable tablet Take 1 tablet by mouth daily    Historical Provider, MD   aspirin EC 81 MG EC tablet Take 2 tablets by mouth daily  Patient not taking: Reported on 7/13/2023 1/9/23   Kellie uTrner DO   Prenatal MV & Min w/FA-DHA (ONE A DAY PRENATAL PO) Take by mouth    Historical Provider, MD     No Known Allergies    Physical Exam:  VITALS:  Vital signs normal  CONSTITUTIONAL:  awake, alert, cooperative, no apparent distress, and appears stated age  ABDOMEN:  non-tender  FHTs: Baseline: 145 bpm, Variability: Good {> 6 bpm), Accelerations: Present, and Decelerations: Absent;  Reactive NST   Uterine activity/Contractions on monitor: No regular contractions  Membranes: intact  Cervix: 3-4 cm dilated, 60% effaced, -3 station; unchanged from office exam yesterday  No evidence of cord prolapse  Presentation: cephalic    GBS negative      Assessment:  38w0d gestation  Not in labor;   Reassuring fetal status    Plan: Discharge home, follow-up at next [facility] appointment DIFFICULTY BREATHING/DYSPNEA ON EXERTION/SHORTNESS OF BREATH

## 2025-03-28 ENCOUNTER — TELEPHONE (OUTPATIENT)
Dept: FAMILY MEDICINE CLINIC | Facility: CLINIC | Age: 40
End: 2025-03-28

## 2025-03-28 NOTE — TELEPHONE ENCOUNTER
----- Message from Garland FUNES sent at 3/28/2025  8:55 AM EDT -----  Regarding: [facility] Appointment Request  [facility] Appointment Request    Patient needs appointment for E[facility] Appointment Type: New Patient.    Patient Requested Dates(s):ASAP  Patient Requested Time:Mornings  Provider Name:Laurence Louis    Reason for Appointment Request: New Patient - Available appointments did not meet patient need  --------------------------------------------------------------------------------------------------------------------------    Relationship to Patient: Self     Call Back Information: OK to leave message on voicemail  Preferred Call Back Number: Phone [phone]

## 2025-04-04 SDOH — HEALTH STABILITY: PHYSICAL HEALTH: ON AVERAGE, HOW MANY DAYS PER WEEK DO YOU ENGAGE IN MODERATE TO STRENUOUS EXERCISE (LIKE A BRISK WALK)?: 2 DAYS

## 2025-04-04 SDOH — HEALTH STABILITY: PHYSICAL HEALTH: ON AVERAGE, HOW MANY MINUTES DO YOU ENGAGE IN EXERCISE AT THIS LEVEL?: 30 MIN

## 2025-04-07 ENCOUNTER — OFFICE VISIT (OUTPATIENT)
Dept: FAMILY MEDICINE CLINIC | Facility: CLINIC | Age: 40
End: 2025-04-07
Payer: MEDICAID

## 2025-04-07 VITALS
BODY MASS INDEX: 23.9 KG/M2 | OXYGEN SATURATION: 99 % | WEIGHT: 140 LBS | SYSTOLIC BLOOD PRESSURE: 118 MMHG | DIASTOLIC BLOOD PRESSURE: 80 MMHG | TEMPERATURE: 98 F | RESPIRATION RATE: 18 BRPM | HEIGHT: 64 IN | HEART RATE: 79 BPM

## 2025-04-07 DIAGNOSIS — Z71.1 CONCERN ABOUT MEMORY: ICD-10-CM

## 2025-04-07 DIAGNOSIS — Z13.1 SCREENING FOR DIABETES MELLITUS: ICD-10-CM

## 2025-04-07 DIAGNOSIS — Z00.00 ROUTINE GENERAL MEDICAL EXAMINATION AT A HEALTH CARE FACILITY: Primary | ICD-10-CM

## 2025-04-07 DIAGNOSIS — R61 NIGHT SWEAT: ICD-10-CM

## 2025-04-07 DIAGNOSIS — Z00.00 ROUTINE GENERAL MEDICAL EXAMINATION AT A HEALTH CARE FACILITY: ICD-10-CM

## 2025-04-07 DIAGNOSIS — Z12.31 ENCOUNTER FOR SCREENING MAMMOGRAM FOR MALIGNANT NEOPLASM OF BREAST: ICD-10-CM

## 2025-04-07 LAB
ALBUMIN SERPL-MCNC: 4.3 G/DL (ref 3.5–5)
ALBUMIN/GLOB SERPL: 1.3 (ref 1–1.9)
ALP SERPL-CCNC: 64 U/L (ref 35–104)
ALT SERPL-CCNC: 10 U/L (ref 8–45)
ANION GAP SERPL CALC-SCNC: 11 MMOL/L (ref 7–16)
AST SERPL-CCNC: 17 U/L (ref 15–37)
BASOPHILS # BLD: 0.03 K/UL (ref 0–0.2)
BASOPHILS NFR BLD: 0.7 % (ref 0–2)
BILIRUB SERPL-MCNC: 0.3 MG/DL (ref 0–1.2)
BUN SERPL-MCNC: 7 MG/DL (ref 6–23)
CALCIUM SERPL-MCNC: 10 MG/DL (ref 8.8–10.2)
CHLORIDE SERPL-SCNC: 105 MMOL/L (ref 98–107)
CHOLEST SERPL-MCNC: 110 MG/DL (ref 0–200)
CO2 SERPL-SCNC: 24 MMOL/L (ref 20–29)
CREAT SERPL-MCNC: 0.72 MG/DL (ref 0.6–1.1)
DIFFERENTIAL METHOD BLD: ABNORMAL
EOSINOPHIL # BLD: 0.02 K/UL (ref 0–0.8)
EOSINOPHIL NFR BLD: 0.5 % (ref 0.5–7.8)
ERYTHROCYTE [DISTWIDTH] IN BLOOD BY AUTOMATED COUNT: 12.4 % (ref 11.9–14.6)
EST. AVERAGE GLUCOSE BLD GHB EST-MCNC: 107 MG/DL
GLOBULIN SER CALC-MCNC: 3.3 G/DL (ref 2.3–3.5)
GLUCOSE SERPL-MCNC: 94 MG/DL (ref 70–99)
HBA1C MFR BLD: 5.4 % (ref 0–5.6)
HCG UR QL: NEGATIVE
HCT VFR BLD AUTO: 43.8 % (ref 35.8–46.3)
HDLC SERPL-MCNC: 72 MG/DL (ref 40–60)
HDLC SERPL: 1.5 (ref 0–5)
HGB BLD-MCNC: 14.5 G/DL (ref 11.7–15.4)
IMM GRANULOCYTES # BLD AUTO: 0.01 K/UL (ref 0–0.5)
IMM GRANULOCYTES NFR BLD AUTO: 0.2 % (ref 0–5)
LDLC SERPL CALC-MCNC: 33 MG/DL (ref 0–100)
LYMPHOCYTES # BLD: 2.24 K/UL (ref 0.5–4.6)
LYMPHOCYTES NFR BLD: 55.7 % (ref 13–44)
MCH RBC QN AUTO: 29 PG (ref 26.1–32.9)
MCHC RBC AUTO-ENTMCNC: 33.1 G/DL (ref 31.4–35)
MCV RBC AUTO: 87.6 FL (ref 82–102)
MONOCYTES # BLD: 0.24 K/UL (ref 0.1–1.3)
MONOCYTES NFR BLD: 6 % (ref 4–12)
NEUTS SEG # BLD: 1.48 K/UL (ref 1.7–8.2)
NEUTS SEG NFR BLD: 36.9 % (ref 43–78)
NRBC # BLD: 0 K/UL (ref 0–0.2)
PLATELET # BLD AUTO: 180 K/UL (ref 150–450)
PMV BLD AUTO: 12.2 FL (ref 9.4–12.3)
POTASSIUM SERPL-SCNC: 4.1 MMOL/L (ref 3.5–5.1)
PROT SERPL-MCNC: 7.6 G/DL (ref 6.3–8.2)
RBC # BLD AUTO: 5 M/UL (ref 4.05–5.2)
SODIUM SERPL-SCNC: 140 MMOL/L (ref 136–145)
TRIGL SERPL-MCNC: 29 MG/DL (ref 0–150)
TSH W FREE THYROID IF ABNORMAL: 1.65 UIU/ML (ref 0.27–4.2)
VLDLC SERPL CALC-MCNC: 6 MG/DL (ref 6–23)
WBC # BLD AUTO: 4 K/UL (ref 4.3–11.1)

## 2025-04-07 PROCEDURE — 99385 PREV VISIT NEW AGE 18-39: CPT | Performed by: NURSE PRACTITIONER

## 2025-04-07 PROCEDURE — 99203 OFFICE O/P NEW LOW 30 MIN: CPT | Performed by: NURSE PRACTITIONER

## 2025-04-07 SDOH — ECONOMIC STABILITY: FOOD INSECURITY: WITHIN THE PAST 12 MONTHS, YOU WORRIED THAT YOUR FOOD WOULD RUN OUT BEFORE YOU GOT MONEY TO BUY MORE.: NEVER TRUE

## 2025-04-07 SDOH — ECONOMIC STABILITY: FOOD INSECURITY: WITHIN THE PAST 12 MONTHS, THE FOOD YOU BOUGHT JUST DIDN'T LAST AND YOU DIDN'T HAVE MONEY TO GET MORE.: NEVER TRUE

## 2025-04-07 ASSESSMENT — PATIENT HEALTH QUESTIONNAIRE - PHQ9
SUM OF ALL RESPONSES TO PHQ QUESTIONS 1-9: 0
2. FEELING DOWN, DEPRESSED OR HOPELESS: NOT AT ALL
1. LITTLE INTEREST OR PLEASURE IN DOING THINGS: NOT AT ALL
SUM OF ALL RESPONSES TO PHQ QUESTIONS 1-9: 0

## 2025-04-07 ASSESSMENT — ENCOUNTER SYMPTOMS
CONSTIPATION: 1
RESPIRATORY NEGATIVE: 1

## 2025-04-07 NOTE — PROGRESS NOTES
2025    Brianna Mcdonald (:  1985) is a 39 y.o. female, here for a preventive medicine evaluation.    Subjective   Patient Active Problem List   Diagnosis    Encounter for supervision of low-risk pregnancy, antepartum    Other hemorrhoids    GERD (gastroesophageal reflux disease)    AMA (advanced maternal age) multigravida 35+, second trimester    Anxiety in pregnancy in second trimester, antepartum    Thrombocytopenia affecting pregnancy    39 weeks gestation of pregnancy     delivery delivered    Pain in female pelvis    Postpartum cramps     To establish care and for other things.    Hx of anemia - only during pregnancies.    History of right breast lump that was benign- had US, diagnostic mammogram in the past. Thinks last mammogram .     Tobacco use- used to smoke cigarettes. Now vaping- working on quitting- lowering dose on nicotine now. Working on cutting back on times the vape is around her. If she does cold turkey will be very irritable- has a 7, 4, and almost 2 year old. Diagnosed post partum depression with 2nd. With last baby knew how to naviage her triggers so did not have to do medicine with last baby.    Gets anxious easy. Thinks anxiety has been going on all her life. Was put on lexapro after 2nd baby- thinks she took for 8 months- did help with depression but she is unsure about anxiety. Started at 10 - 20 mg was too much- went back to 10 mg- after awhile felt fine and just stopped. She is not sleeping good- wakes up around 3 AM night sweats. Does not feel anxious/sad- wakes up due to sweating. This is every night- started after 3rd baby- 2023- OB told her if it persisted after 6 months to let her know. Has monthly cycles. Last time she saw GYN . This is also last time TSH was checked. Feels like she is having a hard time getting chores done- this started 4 months ago. Leaving car doors open.       Review of Systems   Respiratory: Negative.     Cardiovascular:

## 2025-04-08 ENCOUNTER — RESULTS FOLLOW-UP (OUTPATIENT)
Dept: FAMILY MEDICINE CLINIC | Facility: CLINIC | Age: 40
End: 2025-04-08

## 2025-05-29 ENCOUNTER — OFFICE VISIT (OUTPATIENT)
Dept: OBGYN CLINIC | Age: 40
End: 2025-05-29
Payer: MEDICAID

## 2025-05-29 VITALS
WEIGHT: 141 LBS | HEIGHT: 64 IN | DIASTOLIC BLOOD PRESSURE: 72 MMHG | BODY MASS INDEX: 24.07 KG/M2 | SYSTOLIC BLOOD PRESSURE: 114 MMHG

## 2025-05-29 DIAGNOSIS — Z01.419 WELL WOMAN EXAM WITH ROUTINE GYNECOLOGICAL EXAM: Primary | ICD-10-CM

## 2025-05-29 DIAGNOSIS — Z12.4 SCREENING FOR CERVICAL CANCER: ICD-10-CM

## 2025-05-29 DIAGNOSIS — Z11.51 SCREENING FOR HUMAN PAPILLOMAVIRUS (HPV): ICD-10-CM

## 2025-05-29 PROCEDURE — 99395 PREV VISIT EST AGE 18-39: CPT | Performed by: OBSTETRICS & GYNECOLOGY

## 2025-05-29 NOTE — PROGRESS NOTES
Patient presents today for   Chief Complaint   Patient presents with    Annual Exam         LMP: Patient's last menstrual period was 2025.  Contraception: considering options        No Known Allergies  Current Outpatient Medications   Medication Sig Dispense Refill    norethindrone-ethinyl estradiol (LOESTRIN FE ) 1-20 MG-MCG per tablet Take 1 tablet by mouth daily 3 packet 3    Ibuprofen (MOTRIN PO) Take by mouth      Multiple Vitamins-Minerals (MULTIVITAMIN WOMEN PO) Take by mouth       No current facility-administered medications for this visit.     Past Medical History:   Diagnosis Date    Anemia     Breast disorder     right breast lump-benign     Past Surgical History:   Procedure Laterality Date     SECTION N/A 2023     SECTION performed by Jayla Macario MD at [facility]    DILATION AND CURETTAGE OF UTERUS      for MAB around 9wks     REFRACTIVE SURGERY      WISDOM TOOTH EXTRACTION       Social History     Socioeconomic History    Marital status:      Spouse name: Not on file    Number of children: Not on file    Years of education: Not on file    Highest education level: Not on file   Occupational History    Not on file   Tobacco Use    Smoking status: Former     Current packs/day: 0.00     Types: Cigarettes     Quit date: 2/10/2017     Years since quittin.3     Passive exposure: Never    Smokeless tobacco: Never   Vaping Use    Vaping status: Every Day    Substances: Nicotine    Devices: Pre-filled or refillable cartridge   Substance and Sexual Activity    Alcohol use: No    Drug use: No    Sexual activity: Yes     Partners: Male     Birth control/protection: Inserts   Other Topics Concern    Not on file   Social History Narrative    Not on file     Social Drivers of Health     Financial Resource Strain: Not on file   Food Insecurity: No Food Insecurity (2025)    Hunger Vital Sign     Worried About Running Out of Food in the Last Year: Never true     Ran Out  [Perimenopausal] : perimenopausal

## 2025-05-30 ENCOUNTER — TELEPHONE (OUTPATIENT)
Dept: OBGYN CLINIC | Age: 40
End: 2025-05-30

## 2025-05-30 NOTE — TELEPHONE ENCOUNTER
Pt was in for an annual appointment yesterday and was supposed to get a birth control prescribed but it hasn't been ordered yet. MD consulted for recommendation. Patient would like a call back when the order is in.

## 2025-06-02 LAB
COLLECTION METHOD: NORMAL
CYTOLOGIST CVX/VAG CYTO: NORMAL
CYTOLOGY CVX/VAG DOC THIN PREP: NORMAL
DATE OF LMP: 0
HPV APTIMA: NEGATIVE
Lab: NORMAL
PAP SOURCE: NORMAL
PATH REPORT.FINAL DX SPEC: NORMAL
STAT OF ADQ CVX/VAG CYTO-IMP: NORMAL

## 2025-06-05 RX ORDER — NORETHINDRONE ACETATE AND ETHINYL ESTRADIOL 1MG-20(21)
1 KIT ORAL DAILY
Qty: 3 PACKET | Refills: 3 | Status: SHIPPED | OUTPATIENT
Start: 2025-06-05

## 2025-06-09 ENCOUNTER — RESULTS FOLLOW-UP (OUTPATIENT)
Dept: OBGYN CLINIC | Age: 40
End: 2025-06-09

## 2025-06-24 ENCOUNTER — TELEPHONE (OUTPATIENT)
Dept: OBGYN CLINIC | Age: 40
End: 2025-06-24

## 2025-07-17 ENCOUNTER — OFFICE VISIT (OUTPATIENT)
Dept: OBGYN CLINIC | Age: 40
End: 2025-07-17
Payer: MEDICAID

## 2025-07-17 VITALS
SYSTOLIC BLOOD PRESSURE: 120 MMHG | BODY MASS INDEX: 24.59 KG/M2 | HEIGHT: 64 IN | WEIGHT: 144 LBS | DIASTOLIC BLOOD PRESSURE: 78 MMHG

## 2025-07-17 DIAGNOSIS — G47.9 SLEEP DISTURBANCE: ICD-10-CM

## 2025-07-17 DIAGNOSIS — Z80.3 FAMILY HISTORY OF BREAST CANCER IN FIRST DEGREE RELATIVE: ICD-10-CM

## 2025-07-17 DIAGNOSIS — Z80.3 FAMILY HISTORY OF BREAST CANCER: ICD-10-CM

## 2025-07-17 DIAGNOSIS — R23.2 HOT FLASHES: Primary | ICD-10-CM

## 2025-07-17 PROCEDURE — 99214 OFFICE O/P EST MOD 30 MIN: CPT | Performed by: OBSTETRICS & GYNECOLOGY

## 2025-07-17 NOTE — PROGRESS NOTES
CC:   Chief Complaint   Patient presents with    Other     C/o perimenopause symptoms. Currently on birth control. Wants to discuss effectiveness. Night sweats have increased as well as sleeplessness. Irritability has improved.     HPI:    Brianna  is a 40 y.o. , , Patient's last menstrual period was 2025 (exact date).    History of Present Illness  The patient presents for sleep disturbance, hot flashes, and concerns about family history of breast cancer.    She consulted Dr. Douglas a month ago for her annual preventative visit. Prior to this, she had seen her family doctor due to symptoms suggestive of ADHD, such as executive dysfunction and memory issues. Her family doctor recommended that she see her GYN to determine if these symptoms were related to perimenopause rather than ADHD. Dr. MCKOY attributed her symptoms to sleep deprivation, noting that she wakes up at 1 AM, 3 AM, and 6 AM. She was prescribed a new birth control pill, which she has been taking for a month. Despite this, her sleep issues persist. She is concerned about the potential risk of breast cancer associated with birth control pills, given her family history of the disease. She maintains a regular exercise routine, working out three times a week, and avoids using her phone after 8 PM.    She experiences night sweats and discomfort from heat, even though she keeps her room temperature at 68 degrees and does not wear heavy clothing. She consumes one cup of coffee in the morning and does not consume alcohol or energy drinks. The birth control pill has not alleviated her hot flashes, which have increased in frequency from pre-menstrual to nightly occurrences. She had two menstrual periods last month as her body adjusted to the new medication. She uses a vape with nicotine.    She is interested in genetic testing to assess her risk of breast cancer.    CONTRACEPTION:  Currently using birth control pills for one month. Concerned about breast

## (undated) DEVICE — TRAY CATH 16FR PVC INTMIT STR TIP PREATTACH TO 1000ML COLL

## (undated) DEVICE — KENDALL SCD EXPRESS SLEEVES, KNEE LENGTH, MEDIUM: Brand: KENDALL SCD

## (undated) DEVICE — TUBING, SUCTION, 1/4" X 10', STRAIGHT: Brand: MEDLINE

## (undated) DEVICE — SURGICAL PROCEDURE PACK C SECT CDS

## (undated) DEVICE — SOLUTION IV 1000ML 0.9% SOD CHL

## (undated) DEVICE — AMD ANTIMICROBIAL NON-ADHERENT PAD,0.2% POLYHEXAMETHYLENE BIGUANIDE HCI (PHMB): Brand: TELFA

## (undated) DEVICE — AMD ANTIMICROBIAL GAUZE SPONGES,12 PLY USP TYPE VII, 0.2% POLYHEXAMETHYLENE BIGUANIDE HCI (PHMB): Brand: CURITY

## (undated) DEVICE — SUTURE MCRYL SZ 3-0 L27IN ABSRB UD L60MM KS STR REV CUT Y523H

## (undated) DEVICE — Device: Brand: PORTEX

## (undated) DEVICE — ELECTRODE PT RET AD L9FT HI MOIST COND ADH HYDRGEL CORDED

## (undated) DEVICE — SOLUTION IRRIG 1000ML H2O STRL BLT

## (undated) DEVICE — SUTURE PLN GUT SZ 2-0 L27IN ABSRB YELLOWISH TAN L40MM CT 853H

## (undated) DEVICE — PENCIL ES L3M BTTN SWCH S STL HEX LOK BLDE ELECTRD HOLSTER

## (undated) DEVICE — SUTURE VCRL SZ 0 L36IN ABSRB UD L36MM CT-1 1/2 CIR J946H

## (undated) DEVICE — SUTURE VCRL SZ 2-0 L36IN ABSRB VLT L36MM CT-1 1/2 CIR J345H

## (undated) DEVICE — STERILE POLYISOPRENE POWDER-FREE SURGICAL GLOVES: Brand: PROTEXIS

## (undated) DEVICE — TRAY PREP DRY W/ PREM GLV 2 APPL 6 SPNG 2 UNDPD 1 OVERWRAP